# Patient Record
Sex: MALE | Race: BLACK OR AFRICAN AMERICAN | Employment: UNEMPLOYED | URBAN - METROPOLITAN AREA
[De-identification: names, ages, dates, MRNs, and addresses within clinical notes are randomized per-mention and may not be internally consistent; named-entity substitution may affect disease eponyms.]

---

## 2019-02-25 ENCOUNTER — APPOINTMENT (OUTPATIENT)
Dept: ULTRASOUND IMAGING | Age: 59
DRG: 175 | End: 2019-02-25
Attending: HOSPITALIST
Payer: MEDICARE

## 2019-02-25 ENCOUNTER — HOSPITAL ENCOUNTER (INPATIENT)
Age: 59
LOS: 4 days | Discharge: HOME | DRG: 175 | End: 2019-03-01
Attending: EMERGENCY MEDICINE | Admitting: HOSPITALIST
Payer: MEDICARE

## 2019-02-25 DIAGNOSIS — I10 HYPERTENSION, UNSPECIFIED TYPE: ICD-10-CM

## 2019-02-25 DIAGNOSIS — J96.00 ACUTE RESPIRATORY FAILURE, UNSPECIFIED WHETHER WITH HYPOXIA OR HYPERCAPNIA (HCC): ICD-10-CM

## 2019-02-25 DIAGNOSIS — J44.1 COPD EXACERBATION (HCC): ICD-10-CM

## 2019-02-25 DIAGNOSIS — I26.99 OTHER ACUTE PULMONARY EMBOLISM WITHOUT ACUTE COR PULMONALE (HCC): Primary | ICD-10-CM

## 2019-02-25 PROBLEM — J96.01 ACUTE RESPIRATORY FAILURE WITH HYPOXIA (HCC): Status: ACTIVE | Noted: 2019-02-25

## 2019-02-25 LAB
ARTERIAL PATENCY WRIST A: ABNORMAL
BASE DEFICIT BLDV-SCNC: 3 MMOL/L
BDY SITE: ABNORMAL
BNP SERPL-MCNC: 228 PG/ML
CK MB CFR SERPL CALC: 1.5 % (ref 0–2.5)
CK MB SERPL-MCNC: 4.5 NG/ML (ref 5–25)
CK SERPL-CCNC: 299 U/L (ref 39–308)
COMMENT, HOLDF: NORMAL
GAS FLOW.O2 O2 DELIVERY SYS: ABNORMAL L/MIN
HCO3 BLDV-SCNC: 22.4 MMOL/L (ref 23–28)
O2/TOTAL GAS SETTING VFR VENT: 50 %
PCO2 BLDV: 40 MMHG (ref 41–51)
PH BLDV: 7.36 [PH] (ref 7.32–7.42)
PO2 BLDV: 175 MMHG (ref 25–40)
SAMPLES BEING HELD,HOLD: NORMAL
SAO2 % BLDV: 100 % (ref 65–88)
SPECIMEN TYPE: ABNORMAL
TROPONIN I SERPL-MCNC: <0.05 NG/ML

## 2019-02-25 PROCEDURE — 94640 AIRWAY INHALATION TREATMENT: CPT

## 2019-02-25 PROCEDURE — 65660000001 HC RM ICU INTERMED STEPDOWN

## 2019-02-25 PROCEDURE — 94660 CPAP INITIATION&MGMT: CPT

## 2019-02-25 PROCEDURE — 82550 ASSAY OF CK (CPK): CPT

## 2019-02-25 PROCEDURE — 82803 BLOOD GASES ANY COMBINATION: CPT

## 2019-02-25 PROCEDURE — 93970 EXTREMITY STUDY: CPT

## 2019-02-25 PROCEDURE — 74011000250 HC RX REV CODE- 250

## 2019-02-25 PROCEDURE — 82553 CREATINE MB FRACTION: CPT

## 2019-02-25 PROCEDURE — 84484 ASSAY OF TROPONIN QUANT: CPT

## 2019-02-25 PROCEDURE — 36415 COLL VENOUS BLD VENIPUNCTURE: CPT

## 2019-02-25 PROCEDURE — 77030029684 HC NEB SM VOL KT MONA -A

## 2019-02-25 PROCEDURE — 5A09357 ASSISTANCE WITH RESPIRATORY VENTILATION, LESS THAN 24 CONSECUTIVE HOURS, CONTINUOUS POSITIVE AIRWAY PRESSURE: ICD-10-PCS | Performed by: HOSPITALIST

## 2019-02-25 PROCEDURE — 74011250636 HC RX REV CODE- 250/636: Performed by: HOSPITALIST

## 2019-02-25 PROCEDURE — 74011000250 HC RX REV CODE- 250: Performed by: EMERGENCY MEDICINE

## 2019-02-25 PROCEDURE — 99285 EMERGENCY DEPT VISIT HI MDM: CPT

## 2019-02-25 PROCEDURE — 83880 ASSAY OF NATRIURETIC PEPTIDE: CPT

## 2019-02-25 PROCEDURE — 74011250637 HC RX REV CODE- 250/637: Performed by: HOSPITALIST

## 2019-02-25 RX ORDER — BUPROPION HYDROCHLORIDE 150 MG/1
150 TABLET, EXTENDED RELEASE ORAL 2 TIMES DAILY
Status: DISCONTINUED | OUTPATIENT
Start: 2019-02-25 | End: 2019-03-01 | Stop reason: HOSPADM

## 2019-02-25 RX ORDER — FLUTICASONE FUROATE AND VILANTEROL 100; 25 UG/1; UG/1
1 POWDER RESPIRATORY (INHALATION) DAILY
Status: DISCONTINUED | OUTPATIENT
Start: 2019-02-26 | End: 2019-02-26

## 2019-02-25 RX ORDER — IPRATROPIUM BROMIDE AND ALBUTEROL SULFATE 2.5; .5 MG/3ML; MG/3ML
SOLUTION RESPIRATORY (INHALATION)
Status: COMPLETED
Start: 2019-02-25 | End: 2019-02-25

## 2019-02-25 RX ORDER — IPRATROPIUM BROMIDE AND ALBUTEROL SULFATE 2.5; .5 MG/3ML; MG/3ML
3 SOLUTION RESPIRATORY (INHALATION)
Status: COMPLETED | OUTPATIENT
Start: 2019-02-25 | End: 2019-02-25

## 2019-02-25 RX ORDER — HYDROCHLOROTHIAZIDE 25 MG/1
25 TABLET ORAL DAILY
Status: DISCONTINUED | OUTPATIENT
Start: 2019-02-25 | End: 2019-02-28

## 2019-02-25 RX ORDER — ASPIRIN 81 MG/1
81 TABLET ORAL DAILY
Status: DISCONTINUED | OUTPATIENT
Start: 2019-02-25 | End: 2019-03-01 | Stop reason: HOSPADM

## 2019-02-25 RX ORDER — NIFEDIPINE 90 MG/1
90 TABLET, EXTENDED RELEASE ORAL DAILY
Status: DISCONTINUED | OUTPATIENT
Start: 2019-02-25 | End: 2019-03-01 | Stop reason: HOSPADM

## 2019-02-25 RX ORDER — IPRATROPIUM BROMIDE AND ALBUTEROL SULFATE 2.5; .5 MG/3ML; MG/3ML
3 SOLUTION RESPIRATORY (INHALATION) ONCE
Status: COMPLETED | OUTPATIENT
Start: 2019-02-25 | End: 2019-02-25

## 2019-02-25 RX ORDER — ACETAMINOPHEN 325 MG/1
325 TABLET ORAL
Status: DISCONTINUED | OUTPATIENT
Start: 2019-02-25 | End: 2019-03-01 | Stop reason: HOSPADM

## 2019-02-25 RX ORDER — LORAZEPAM 2 MG/ML
0.5 INJECTION INTRAMUSCULAR
Status: DISCONTINUED | OUTPATIENT
Start: 2019-02-25 | End: 2019-03-01 | Stop reason: HOSPADM

## 2019-02-25 RX ORDER — LABETALOL HCL 20 MG/4 ML
20 SYRINGE (ML) INTRAVENOUS
Status: DISCONTINUED | OUTPATIENT
Start: 2019-02-25 | End: 2019-03-01 | Stop reason: HOSPADM

## 2019-02-25 RX ORDER — MONTELUKAST SODIUM 10 MG/1
10 TABLET ORAL DAILY
Status: DISCONTINUED | OUTPATIENT
Start: 2019-02-25 | End: 2019-03-01 | Stop reason: HOSPADM

## 2019-02-25 RX ORDER — ALBUTEROL SULFATE 90 UG/1
2 AEROSOL, METERED RESPIRATORY (INHALATION)
COMMUNITY

## 2019-02-25 RX ORDER — LISINOPRIL 20 MG/1
40 TABLET ORAL DAILY
Status: DISCONTINUED | OUTPATIENT
Start: 2019-02-25 | End: 2019-03-01 | Stop reason: HOSPADM

## 2019-02-25 RX ORDER — GUAIFENESIN/DEXTROMETHORPHAN 100-10MG/5
10 SYRUP ORAL EVERY 6 HOURS
Status: COMPLETED | OUTPATIENT
Start: 2019-02-25 | End: 2019-02-28

## 2019-02-25 RX ORDER — ATORVASTATIN CALCIUM 20 MG/1
20 TABLET, FILM COATED ORAL DAILY
Status: DISCONTINUED | OUTPATIENT
Start: 2019-02-25 | End: 2019-03-01 | Stop reason: HOSPADM

## 2019-02-25 RX ORDER — ENOXAPARIN SODIUM 100 MG/ML
1 INJECTION SUBCUTANEOUS EVERY 12 HOURS
Status: DISCONTINUED | OUTPATIENT
Start: 2019-02-25 | End: 2019-02-27

## 2019-02-25 RX ADMIN — ASPIRIN 81 MG: 81 TABLET, COATED ORAL at 18:04

## 2019-02-25 RX ADMIN — GUAIFENESIN AND DEXTROMETHORPHAN 10 ML: 100; 10 SYRUP ORAL at 23:26

## 2019-02-25 RX ADMIN — HYDROCHLOROTHIAZIDE 25 MG: 25 TABLET ORAL at 18:05

## 2019-02-25 RX ADMIN — METHYLPREDNISOLONE SODIUM SUCCINATE 40 MG: 40 INJECTION, POWDER, FOR SOLUTION INTRAMUSCULAR; INTRAVENOUS at 18:05

## 2019-02-25 RX ADMIN — IPRATROPIUM BROMIDE AND ALBUTEROL SULFATE 3 ML: .5; 3 SOLUTION RESPIRATORY (INHALATION) at 13:34

## 2019-02-25 RX ADMIN — ENOXAPARIN SODIUM 70 MG: 80 INJECTION, SOLUTION INTRAVENOUS; SUBCUTANEOUS at 23:26

## 2019-02-25 RX ADMIN — GUAIFENESIN AND DEXTROMETHORPHAN 10 ML: 100; 10 SYRUP ORAL at 18:03

## 2019-02-25 RX ADMIN — ATORVASTATIN CALCIUM 20 MG: 20 TABLET, FILM COATED ORAL at 18:04

## 2019-02-25 RX ADMIN — IPRATROPIUM BROMIDE AND ALBUTEROL SULFATE 3 ML: .5; 3 SOLUTION RESPIRATORY (INHALATION) at 15:57

## 2019-02-25 RX ADMIN — MONTELUKAST 10 MG: 10 TABLET, FILM COATED ORAL at 18:04

## 2019-02-25 RX ADMIN — METHYLPREDNISOLONE SODIUM SUCCINATE 40 MG: 40 INJECTION, POWDER, FOR SOLUTION INTRAMUSCULAR; INTRAVENOUS at 23:26

## 2019-02-25 RX ADMIN — NIFEDIPINE 90 MG: 90 TABLET, FILM COATED, EXTENDED RELEASE ORAL at 18:04

## 2019-02-25 RX ADMIN — LISINOPRIL 40 MG: 20 TABLET ORAL at 18:05

## 2019-02-25 RX ADMIN — BUPROPION HYDROCHLORIDE 150 MG: 150 TABLET, FILM COATED, EXTENDED RELEASE ORAL at 18:04

## 2019-02-25 RX ADMIN — IPRATROPIUM BROMIDE AND ALBUTEROL SULFATE 3 ML: 2.5; .5 SOLUTION RESPIRATORY (INHALATION) at 15:57

## 2019-02-25 NOTE — ED NOTES
Spoke with Yordy Nguyen, [facility] care, and he will place patient on dayshift list to have AD completed.

## 2019-02-25 NOTE — ED NOTES
Patient rang out, reporting severe dyspnea and tachycardia. Patient is diaphoretic. He states that he had a coughing spell and then became severely short of breath. Patient placed back on Bipap. Verbal order received from Dr. Domenica Aguilar for duo-neb.

## 2019-02-25 NOTE — PROGRESS NOTES
Reason for Admission:   Pulmonary emboli, acute respiratory failure with hypoxia RRAT Score:          5 Plan for utilizing home health:      TBD Likelihood of Readmission:  Moderate Transition of Care Plan:                  Pt is a 60 y/o [place] American male who was admitted with a diagnosis of Pulmonary emboli, acute respiratory failure with hypoxia. CM met with pt and pt's family at bedside, introduced self, role. Pt and family verbalized understanding. Pt verified demographic information on chart. Pt sees a physician at [facility]. Pt lives with his girlfriend in a 1 story home with 2 JAREN. Pt is independent in ADL/IADL needs at baseline. Pt does not have DME in the home. Pt does drive, but has supportive family to assist with transportation as needed, to include discharge. Pt has not utilized home health or SNF prior to admission. Pt stated he would like to complete an advance directive tonight. CM informed nursing staff. Pt stated he has no further questions or needs re: discharge planning at this time. CM will continue to remain available for support, discharge planning as needed. Care Management Interventions PCP Verified by CM: Yes Mode of Transport at Discharge: Other (see comment)(Pt's family to transport) Transition of Care Consult (CM Consult): Discharge Planning(Pt is independent in ADL/IADL needs at baseline. Pt has not utlized home health or SNF prior to admission) Discharge Durable Medical Equipment: No(Pt does not have DME in the home) Physical Therapy Consult: No 
Occupational Therapy Consult: No 
Speech Therapy Consult: No 
Current Support Network: Own Home(Pt lives in a 1 story home with 2 JAREN with his girlfriend) Confirm Follow Up Transport: Self Plan discussed with Pt/Family/Caregiver: Yes Discharge Location Discharge Placement: (TBD) Isabella Mojica, MSW, LSW Supervisee in Social Work [facility] [phone]
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
## 2019-02-25 NOTE — ED NOTES
Patient presents to ED via [facility] transport from [facility] for shortness of breath. Patient currently on Bipap and RT at bedside to set up patient in ED. Patient A&O x4 and placed on monitor x3. Patient transported for bilateral PEs.
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
## 2019-02-26 ENCOUNTER — APPOINTMENT (OUTPATIENT)
Dept: NON INVASIVE DIAGNOSTICS | Age: 59
DRG: 175 | End: 2019-02-26
Attending: HOSPITALIST
Payer: MEDICARE

## 2019-02-26 LAB
ANION GAP SERPL CALC-SCNC: 9 MMOL/L (ref 5–15)
BASOPHILS # BLD: 0 K/UL (ref 0–0.1)
BASOPHILS NFR BLD: 0 % (ref 0–1)
BUN SERPL-MCNC: 18 MG/DL (ref 6–20)
BUN/CREAT SERPL: 19 (ref 12–20)
CALCIUM SERPL-MCNC: 8.5 MG/DL (ref 8.5–10.1)
CHLORIDE SERPL-SCNC: 100 MMOL/L (ref 97–108)
CO2 SERPL-SCNC: 24 MMOL/L (ref 21–32)
CREAT SERPL-MCNC: 0.93 MG/DL (ref 0.7–1.3)
DIFFERENTIAL METHOD BLD: ABNORMAL
ECHO AO ROOT DIAM: 3.67 CM
ECHO AV AREA PEAK VELOCITY: 2.9 CM2
ECHO AV AREA/BSA PEAK VELOCITY: 1.6 CM2/M2
ECHO AV CUSP MM: 0 CM
ECHO AV PEAK GRADIENT: 5.9 MMHG
ECHO AV PEAK VELOCITY: 121.54 CM/S
ECHO LA MAJOR AXIS: 2.74 CM
ECHO LA TO AORTIC ROOT RATIO: 0.75
ECHO LV INTERNAL DIMENSION DIASTOLIC: 2.55 CM (ref 4.2–5.9)
ECHO LV INTERNAL DIMENSION SYSTOLIC: 2.09 CM
ECHO LV IVSD: 1.79 CM (ref 0.6–1)
ECHO LV MASS 2D: 144.1 G (ref 88–224)
ECHO LV MASS INDEX 2D: 77.1 G/M2 (ref 49–115)
ECHO LV POSTERIOR WALL DIASTOLIC: 1.17 CM (ref 0.6–1)
ECHO LV POSTERIOR WALL SYSTOLIC: 0.99 CM
ECHO LVOT DIAM: 2.13 CM
ECHO LVOT PEAK GRADIENT: 4 MMHG
ECHO LVOT PEAK VELOCITY: 100.5 CM/S
ECHO LVOT SV: 79.3 ML
ECHO LVOT VTI: 22.22 CM
ECHO MV A VELOCITY: 87.42 CM/S
ECHO MV AREA PHT: 5.7 CM2
ECHO MV AREA VTI: 3.7 CM2
ECHO MV E DECELERATION TIME (DT): 132 MS
ECHO MV E VELOCITY: 0.6 CM/S
ECHO MV E/A RATIO: 0.01
ECHO MV MAX VELOCITY: 82.9 CM/S
ECHO MV MEAN GRADIENT: 1.4 MMHG
ECHO MV PEAK GRADIENT: 2.7 MMHG
ECHO MV PRESSURE HALF TIME (PHT): 38.3 MS
ECHO MV REGURGITANT PEAK GRADIENT: 3.6 MMHG
ECHO MV REGURGITANT PEAK VELOCITY: 95.48 CM/S
ECHO MV VTI: 21.17 CM
ECHO PULMONARY ARTERY SYSTOLIC PRESSURE (PASP): 30 MMHG
ECHO TV MAX VELOCITY: 244.07 CM/S
ECHO TV PEAK GRADIENT: 23.8 MMHG
ECHO TV REGURGITANT MAX VELOCITY: 286.58 CM/S
ECHO TV REGURGITANT PEAK GRADIENT: 32.9 MMHG
EOSINOPHIL # BLD: 0 K/UL (ref 0–0.4)
EOSINOPHIL NFR BLD: 0 % (ref 0–7)
ERYTHROCYTE [DISTWIDTH] IN BLOOD BY AUTOMATED COUNT: 14.1 % (ref 11.5–14.5)
GLUCOSE SERPL-MCNC: 138 MG/DL (ref 65–100)
HCT VFR BLD AUTO: 36.1 % (ref 36.6–50.3)
HGB BLD-MCNC: 12.3 G/DL (ref 12.1–17)
IMM GRANULOCYTES # BLD AUTO: 0 K/UL (ref 0–0.04)
IMM GRANULOCYTES NFR BLD AUTO: 0 % (ref 0–0.5)
LYMPHOCYTES # BLD: 0.7 K/UL (ref 0.8–3.5)
LYMPHOCYTES NFR BLD: 14 % (ref 12–49)
MCH RBC QN AUTO: 32.9 PG (ref 26–34)
MCHC RBC AUTO-ENTMCNC: 34.1 G/DL (ref 30–36.5)
MCV RBC AUTO: 96.5 FL (ref 80–99)
MONOCYTES # BLD: 0.4 K/UL (ref 0–1)
MONOCYTES NFR BLD: 7 % (ref 5–13)
NEUTS SEG # BLD: 3.9 K/UL (ref 1.8–8)
NEUTS SEG NFR BLD: 79 % (ref 32–75)
NRBC # BLD: 0 K/UL (ref 0–0.01)
NRBC BLD-RTO: 0 PER 100 WBC
PLATELET # BLD AUTO: 296 K/UL (ref 150–400)
PMV BLD AUTO: 9 FL (ref 8.9–12.9)
POTASSIUM SERPL-SCNC: 4.1 MMOL/L (ref 3.5–5.1)
RBC # BLD AUTO: 3.74 M/UL (ref 4.1–5.7)
SODIUM SERPL-SCNC: 133 MMOL/L (ref 136–145)
WBC # BLD AUTO: 5 K/UL (ref 4.1–11.1)

## 2019-02-26 PROCEDURE — 74011000250 HC RX REV CODE- 250: Performed by: HOSPITALIST

## 2019-02-26 PROCEDURE — 94660 CPAP INITIATION&MGMT: CPT

## 2019-02-26 PROCEDURE — 74011250636 HC RX REV CODE- 250/636: Performed by: HOSPITALIST

## 2019-02-26 PROCEDURE — 77010033678 HC OXYGEN DAILY

## 2019-02-26 PROCEDURE — 93306 TTE W/DOPPLER COMPLETE: CPT

## 2019-02-26 PROCEDURE — 36415 COLL VENOUS BLD VENIPUNCTURE: CPT

## 2019-02-26 PROCEDURE — 65660000001 HC RM ICU INTERMED STEPDOWN

## 2019-02-26 PROCEDURE — 74011250636 HC RX REV CODE- 250/636: Performed by: INTERNAL MEDICINE

## 2019-02-26 PROCEDURE — 74011250637 HC RX REV CODE- 250/637: Performed by: NURSE PRACTITIONER

## 2019-02-26 PROCEDURE — 94640 AIRWAY INHALATION TREATMENT: CPT

## 2019-02-26 PROCEDURE — 94761 N-INVAS EAR/PLS OXIMETRY MLT: CPT

## 2019-02-26 PROCEDURE — 80048 BASIC METABOLIC PNL TOTAL CA: CPT

## 2019-02-26 PROCEDURE — 85025 COMPLETE CBC W/AUTO DIFF WBC: CPT

## 2019-02-26 PROCEDURE — 74011250637 HC RX REV CODE- 250/637: Performed by: INTERNAL MEDICINE

## 2019-02-26 PROCEDURE — 74011250637 HC RX REV CODE- 250/637: Performed by: HOSPITALIST

## 2019-02-26 RX ORDER — IBUPROFEN 200 MG
1 TABLET ORAL DAILY
Status: DISCONTINUED | OUTPATIENT
Start: 2019-02-26 | End: 2019-02-26

## 2019-02-26 RX ORDER — BENZONATATE 100 MG/1
100 CAPSULE ORAL 3 TIMES DAILY
Status: DISCONTINUED | OUTPATIENT
Start: 2019-02-26 | End: 2019-03-01 | Stop reason: HOSPADM

## 2019-02-26 RX ORDER — LEVOFLOXACIN 5 MG/ML
750 INJECTION, SOLUTION INTRAVENOUS EVERY 24 HOURS
Status: DISCONTINUED | OUTPATIENT
Start: 2019-02-26 | End: 2019-02-28 | Stop reason: SDUPTHER

## 2019-02-26 RX ORDER — IBUPROFEN 200 MG
1 TABLET ORAL DAILY PRN
Status: DISCONTINUED | OUTPATIENT
Start: 2019-02-26 | End: 2019-03-01 | Stop reason: HOSPADM

## 2019-02-26 RX ORDER — IPRATROPIUM BROMIDE AND ALBUTEROL SULFATE 2.5; .5 MG/3ML; MG/3ML
3 SOLUTION RESPIRATORY (INHALATION)
Status: DISCONTINUED | OUTPATIENT
Start: 2019-02-26 | End: 2019-03-01 | Stop reason: HOSPADM

## 2019-02-26 RX ADMIN — METHYLPREDNISOLONE SODIUM SUCCINATE 40 MG: 40 INJECTION, POWDER, FOR SOLUTION INTRAMUSCULAR; INTRAVENOUS at 14:34

## 2019-02-26 RX ADMIN — ATORVASTATIN CALCIUM 20 MG: 20 TABLET, FILM COATED ORAL at 09:24

## 2019-02-26 RX ADMIN — ASPIRIN 81 MG: 81 TABLET, COATED ORAL at 09:24

## 2019-02-26 RX ADMIN — METHYLPREDNISOLONE SODIUM SUCCINATE 40 MG: 40 INJECTION, POWDER, FOR SOLUTION INTRAMUSCULAR; INTRAVENOUS at 06:07

## 2019-02-26 RX ADMIN — HYDROCHLOROTHIAZIDE 25 MG: 25 TABLET ORAL at 09:23

## 2019-02-26 RX ADMIN — MONTELUKAST 10 MG: 10 TABLET, FILM COATED ORAL at 09:24

## 2019-02-26 RX ADMIN — BUPROPION HYDROCHLORIDE 150 MG: 150 TABLET, FILM COATED, EXTENDED RELEASE ORAL at 09:23

## 2019-02-26 RX ADMIN — GUAIFENESIN AND DEXTROMETHORPHAN 10 ML: 100; 10 SYRUP ORAL at 06:00

## 2019-02-26 RX ADMIN — ENOXAPARIN SODIUM 70 MG: 80 INJECTION, SOLUTION INTRAVENOUS; SUBCUTANEOUS at 22:02

## 2019-02-26 RX ADMIN — METHYLPREDNISOLONE SODIUM SUCCINATE 40 MG: 40 INJECTION, POWDER, FOR SOLUTION INTRAMUSCULAR; INTRAVENOUS at 22:02

## 2019-02-26 RX ADMIN — BENZONATATE 100 MG: 100 CAPSULE ORAL at 22:02

## 2019-02-26 RX ADMIN — ENOXAPARIN SODIUM 70 MG: 80 INJECTION, SOLUTION INTRAVENOUS; SUBCUTANEOUS at 09:23

## 2019-02-26 RX ADMIN — NIFEDIPINE 90 MG: 90 TABLET, FILM COATED, EXTENDED RELEASE ORAL at 09:24

## 2019-02-26 RX ADMIN — IPRATROPIUM BROMIDE AND ALBUTEROL SULFATE 3 ML: .5; 3 SOLUTION RESPIRATORY (INHALATION) at 09:00

## 2019-02-26 RX ADMIN — UMECLIDINIUM BROMIDE AND VILANTEROL TRIFENATATE 1 PUFF: 62.5; 25 POWDER RESPIRATORY (INHALATION) at 10:53

## 2019-02-26 RX ADMIN — BENZONATATE 100 MG: 100 CAPSULE ORAL at 10:06

## 2019-02-26 RX ADMIN — GUAIFENESIN AND DEXTROMETHORPHAN 10 ML: 100; 10 SYRUP ORAL at 12:24

## 2019-02-26 RX ADMIN — GUAIFENESIN AND DEXTROMETHORPHAN 10 ML: 100; 10 SYRUP ORAL at 17:30

## 2019-02-26 RX ADMIN — BENZONATATE 100 MG: 100 CAPSULE ORAL at 16:28

## 2019-02-26 RX ADMIN — LEVOFLOXACIN 750 MG: 5 INJECTION, SOLUTION INTRAVENOUS at 10:06

## 2019-02-26 RX ADMIN — BUPROPION HYDROCHLORIDE 150 MG: 150 TABLET, FILM COATED, EXTENDED RELEASE ORAL at 17:30

## 2019-02-26 RX ADMIN — LISINOPRIL 40 MG: 20 TABLET ORAL at 09:23

## 2019-02-26 RX ADMIN — IPRATROPIUM BROMIDE AND ALBUTEROL SULFATE 3 ML: .5; 3 SOLUTION RESPIRATORY (INHALATION) at 02:33

## 2019-02-26 NOTE — PROGRESS NOTES
*CM acknowledged consult* CM will contact [phone] [phone] to receive cash pricing for pt's mediation. CM will need script to fax to [facility] in order to receive ideal pricing for medication (including pt's insurance) ALYSSA Henderson, [address] [facility]

## 2019-02-26 NOTE — PROGRESS NOTES
ADULT PROTOCOL: JET AEROSOL ASSESSMENT Patient  Anson Sorto     59 y.o.   male     2019  5:43 AM 
 
Breath Sounds Pre Procedure: Right Breath Sounds: Lower, Crackles, Diminished Left Breath Sounds: Lower, Crackles, Diminished Breath Sounds Post Procedure: Right Breath Sounds: Diminished Left Breath Sounds: Diminished Breathing pattern: Pre procedure Breathing Pattern: Dyspnea at rest 
        Post procedure Breathing Pattern: Regular Heart Rate: Pre procedure Pulse: 86 
         Post procedure Pulse: 77 Resp Rate: Pre procedure Respirations: 26 Post procedure Respirations: 19 Peak Flow: Pre bronchodilator Post bronchodilator FVC/FEV1:  N/A Incentive Spirometry:    
     
 
Cough: Pre procedure Cough: Non-productive, Congested Post procedure Cough: Non-productive Suctioned: NO Sputum: Pre procedure Post procedure Oxygen: O2 Device: BIPAP   BIPAP Changed: NO SpO2: Pre procedure SpO2: 98 %   with oxygen Post procedure SpO2: 98 %  with oxygen Nebulizer Therapy: Current medications Changed: NO Smoking History:  
 Smoking status: Former Smoker  
    Packs/day: 0.25  
    Last attempt to quit: 2/15/2019  
    Years since quittin.0  Smokeless tobacco: Never Used  Tobacco comment: quit smoking 10 days before. Problem List:  
Patient Active Problem List  
Diagnosis Code  [facility] F10.20  Encounter for long-term (current) use of other medications Z79.899  Unspecified essential hypertension I10  
 Pulmonary emboli (HCC) I26.99  
 Acute respiratory failure with hypoxia (HCC) J96.01 Respiratory Therapist: Laura Fong, RT

## 2019-02-26 NOTE — PROGRESS NOTES
Problem: Breathing Pattern - Ineffective Goal: *Absence of hypoxia Outcome: Progressing Towards Goal 
Pt placed on 2L NC after being off bipap for meal.  
 
Problem: Falls - Risk of 
Goal: *Absence of Falls Document [facility] Fall Risk and appropriate interventions in the flowsheet. Outcome: Progressing Towards Goal 
Fall Risk Interventions: 
  
 
  
 
Medication Interventions: Bed/chair exit alarm, Patient to call before getting OOB, Teach patient to arise slowly

## 2019-02-26 NOTE — ED NOTES
Bedside and Verbal shift change report given to Mike Miranda RN  (oncoming nurse) by Jason Mercer RN (offgoing nurse). Report included the following information SBAR, Kardex, ED Summary, MAR, Accordion and Recent Results.

## 2019-02-26 NOTE — PROGRESS NOTES
3093: Patient taken off BiPAP for medications and for meal. Sats 100%. Will continue to monitor. 1748: VINAYAK Saenz called regarding diet order. Pt requesting an advancement on diet order. Telephone order received for cardiac diet per VINAYAK Wheeler

## 2019-02-26 NOTE — PROGRESS NOTES
Problem: Falls - Risk of 
Goal: *Absence of Falls Document Lacey Richardson Fall Risk and appropriate interventions in the flowsheet. Outcome: Progressing Towards Goal 
Fall Risk Interventions: 
  
 
  
 
Medication Interventions: Bed/chair exit alarm, Patient to call before getting OOB

## 2019-02-26 NOTE — PROGRESS NOTES
1100 
Cardiopulmonary Care Interdisciplinary Rounds were held today to discuss patient's plan of care and outcomes. The following members were present: NP/Physician, Pharmacy, Nursing and Case Management. Expected Length of Stay:  4d 7h 
 
Plan of Care: Continue current treatment plan, pricing eliquis

## 2019-02-26 NOTE — ED NOTES
TRANSFER - OUT REPORT: 
 
Verbal report given to Ruth Ann RN(name) on Prakash Kurtz  being transferred to [address] [place] [address] ([facility]) for routine progression of care Report consisted of patients Situation, Background, Assessment and  
Recommendations(SBAR). Information from the following report(s) SBAR, ED Summary, Procedure Summary, MAR, Recent Results and Cardiac Rhythm S. Tach to NSR was reviewed with the receiving nurse. Lines:  
Peripheral IV 02/25/19 Left Hand (Active) Site Assessment Clean, dry, & intact 2/25/2019  1:49 PM  
Phlebitis Assessment 0 2/25/2019  1:49 PM  
Infiltration Assessment 0 2/25/2019  1:49 PM  
Dressing Status Clean, dry, & intact 2/25/2019  1:49 PM  
Dressing Type Tape;Transparent 2/25/2019  1:49 PM  
Hub Color/Line Status Pink;Flushed 2/25/2019  1:49 PM  
Alcohol Cap Used No 2/25/2019  1:49 PM  
  
 
Opportunity for questions and clarification was provided. Patient transported with: 
 O2 @ 4 liters Patient's medications from home Tech  
RN
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
## 2019-02-26 NOTE — PROGRESS NOTES
0700: Bedside report received from [facility], RN. 
 
1346: Spoke with Dr. Angela Kaplan regarding BiPAP. MD advised RN to have pt off BiPAP for meals (15-30 minutes). Will contact RT. Will continue to monitor. 2890: Sats 84% room air off BiPAP. 2L NC applied and breathing treatment given. Will continue to monitor. 2452: Sats 90%. Will continue to monitor. 1309: Pt remains off BiPAP. Pt on 2L NC, sats 94%. Will continue to monitor. 1900:  
Bedside shift change report given to HALEY Vallecillo (oncoming nurse). Report included the following information SBAR, Kardex, Intake/Output, MAR, Recent Results and Cardiac Rhythm NSR/tach. SHIFT SUMMARY: 
 
 
 
 
 
[address] NURSING NOTE Admission Date 2/25/2019 Admission Diagnosis Acute respiratory failure with hypoxia ([address] [J96.01] Pulmonary emboli ([address] [I26.99] Consults IP CONSULT TO HOSPITALIST 
IP CONSULT TO PULMONOLOGY Cardiac Monitoring [x] Yes [] No  
  
Purposeful Hourly Rounding [x] Yes   
Jess Score Total Score: 1 Jess score 3 or > [x] Bed Alarm [] Avasys [] 1:1 sitter [] Patient refused (Signed refusal form in chart) Zane Score Zane Score: 21 Zane score 14 or < [] PMT consult [] Wound Care consult  
 []  Specialty bed  [] Nutrition consult Influenza Vaccine Received Flu Vaccine for Current Season (usually Sept-March): No  
 Patient/Guardian Refused (Notify MD): Yes Oxygen needs? [] Room air Oxygen @  []1L    [x]2L    []3L   []4L    []5L   []6L via NC Chronic home O2 use? [] Yes [x] No 
Perform O2 challenge test and document in progress note using smartphrase (.Homeoxygen) Last bowel movement Last Bowel Movement Date: 02/22/19 Urinary Catheter LDAs Peripheral IV 02/25/19 Left Hand (Active) Site Assessment Clean, dry, & intact 2/26/2019  3:23 PM  
Phlebitis Assessment 0 2/26/2019  3:23 PM  
Infiltration Assessment 0 2/26/2019  3:23 PM  
 Dressing Status Clean, dry, & intact 2/26/2019  3:23 PM  
Dressing Type Tape;Transparent 2/26/2019  3:23 PM  
Hub Color/Line Status Pink;Flushed 2/26/2019  3:23 PM  
Alcohol Cap Used No 2/25/2019  1:49 PM  
                  
  
Readmission Risk Assessment Tool Score Low Risk   
      
 5 Total Score   
  
 5 Pt. Coverage (Medicare=5 , Medicaid, or Self-Pay=4) Criteria that do not apply:  
 Has Seen PCP in Last 6 Months (Yes=3, No=0) . Living with Significant Other. Assisted Living. LTAC. SNF. or  
Rehab Patient Length of Stay (>5 days = 3) IP Visits Last 12 Months (1-3=4, 4=9, >4=11) Charlson Comorbidity Score (Age + Comorbid Conditions) Expected Length of Stay 4d 7h Actual Length of Stay 1

## 2019-02-26 NOTE — PROGRESS NOTES
Hospitalist Progress Note NAME: Harrison Romero :  1960 MRN:  [id number] Interim Hospital Summary: 59 y.o. male whom presented on 2019 with Assessment / Plan: 
Acute hypoxic and hypercapnic respiratory failure, POA Small bilateral PEs, POA, unprovoked first thromboembolic event COPD with acute exacerbation Tobacco abuse - patient in respiratory distress at [facility], wheezing, speaking only 2-3 words. Improved with bipap. At [address] ER able to be on 4L O2 for 2 hours before coughing fit caused recurrent respiratory distress and needed to put back on bipap. Initial ABG 7.3/46/50/23  81% FIO2 35% - CTA chest with small filling defects within branches of both pulmonary arteries, appear to represent small pulmonary emboli. There is evidence of bullous change involving both lungs. No areas of infiltration are identified. No pleural effusions are seen. Duplex study (-) for DVT 
- lovenox 1mg/kg q12h for now. CM has been consulted on cost assessment for Eliquis 
- Echo result pending Troponin (-), NT pro- Check b/l lower leg dopplers. Since PE is unprovoked, should f/u up with pcp to ensure cancer screening is up to date. - appreciate pulmonology input; continue bipap PRN Continue with IV solumedrol. Xopenex and atrovent nebs. - complete 5 days of ABX 
- continue with anoro ellipta (uses Advair at home), singulair, and duoneb 
- scheduled tessalon perles for 3 days then PRN 
- continue with mucinex 
- continue with wellbutrin; pt agreed with importance of smoking cessation. Pt has been informed to use nicotine patch if he wishes to try HTN Hyperlipidemia 
- continue lisinopril, hctz, nifedipine 
- continue wellubtrin for smoke cessation 
- continue lipitor BMI 23.3 
  
Code: full code DVT prophylaxis:  lovenox Surrogate decision maker:  Discussed with patient, he appoints daughter Nayan Ayers [phone] or son Lynn Lin [phone] as surrogate decision makers. He is  but  x 5 years and currently living with a girlfriend. Disposition: home with family Subjective: Chief Complaint / Reason for Physician Visit \"I can breath a little better today than yesterday\". Discussed with RN events overnight. Review of Systems: 
Symptom Y/N Comments  Symptom Y/N Comments Fever/Chills n   Chest Pain y Poor Appetite    Edema Cough y   Abdominal Pain Sputum n   Joint Pain SOB/BARCENAS y   Pruritis/Rash Nausea/vomit * Bad coughing spell induces nausea  Tolerating PT/OT Diarrhea    Tolerating Diet Constipation    Other Could NOT obtain due to:   
 
Objective: VITALS:  
Last 24hrs VS reviewed since prior progress note. Most recent are: 
Patient Vitals for the past 24 hrs: 
 Temp Pulse Resp BP SpO2  
02/26/19 1427    127/78   
02/26/19 1038 97.4 °F (36.3 °C) 96 20 127/78 92 % 02/26/19 0951     90 % 02/26/19 0923  96  132/87   
02/26/19 0900     (!) 88 % 02/26/19 0855     100 % 02/26/19 0732 97.7 °F (36.5 °C) 85 20 131/80 98 % 02/26/19 0437 97.8 °F (36.6 °C) 84 18 (!) 132/94 99 % 02/26/19 0228     98 % 02/26/19 0227     98 % 02/25/19 2255     98 % 02/25/19 2251 97.7 °F (36.5 °C) 100 24 153/81 98 % 02/25/19 2201 97.1 °F (36.2 °C) (!) 103 27 (!) 148/93 99 % 02/25/19 2130  87 21 (!) 116/91 100 % 02/25/19 2100  99 16 (!) 152/91 99 % 02/25/19 2030  95 24 137/74 98 % 02/25/19 2000  (!) 103 20 (!) 129/102 98 % 02/25/19 1930     100 % 02/25/19 1900  (!) 106 22 150/90 99 % 02/25/19 1830  (!) 116 22 (!) 168/92 98 % 02/25/19 1800  (!) 109 17 (!) 157/95 99 % 02/25/19 1646     97 % 02/25/19 1553  (!) 139 29 (!) 207/99 96 % 02/25/19 1551     (!) 87 % 02/25/19 1550 98.6 °F (37 °C) (!) 144 26 124/63 100 % Intake/Output Summary (Last 24 hours) at 2/26/2019 1457 Last data filed at 2/26/2019 1441 Gross per 24 hour Intake 1320 ml Output 1500 ml Net -180 ml PHYSICAL EXAM: 
General: WD, WN. Alert, cooperative, no acute distress   
EENT:  EOMI. Anicteric sclerae. MMM Resp:  Bilateral exp wheezing. No accessory muscle use CV:  Regular  rhythm,  No edema GI:  Soft, Non distended, Non tender.  +Bowel sounds Neurologic:  Alert and oriented X 3, normal speech, Psych:   Good insight. Not anxious nor agitated Skin:  No rashes. No jaundice Reviewed most current lab test results and cultures  YES Reviewed most current radiology test results   YES Review and summation of old records today    NO Reviewed patient's current orders and MAR    YES 
PMH/SH reviewed - no change compared to H&P 
________________________________________________________________________ Care Plan discussed with: 
  Comments Patient y Family RN y   
Care Manager y Consultant     
                 y Multidiciplinary team rounds were held today with , nursing, pharmacist and clinical coordinator. Patient's plan of care was discussed; medications were reviewed and discharge planning was addressed. ________________________________________________________________________ Total NON critical care TIME:  30   Minutes Total CRITICAL CARE TIME Spent:   Minutes non procedure based Comments >50% of visit spent in counseling and coordination of care    
________________________________________________________________________ Reji Sheldon NP Procedures: see electronic medical records for all procedures/Xrays and details which were not copied into this note but were reviewed prior to creation of Plan. LABS: 
I reviewed today's most current labs and imaging studies. Pertinent labs include: 
Recent Labs  
  02/26/19 0444 02/25/19 2029 WBC 5.0 10.3 HGB 12.3 13.6 HCT 36.1* 40.6  327 Recent Labs  
  02/26/19 0444 02/25/19 
1258 * 136  
K 4.1 3.6  98 CO2 24 25 * 144* BUN 18 15 CREA 0.93 1.06  
CA 8.5 8.7 ALB  --  3.9 TBILI  --  0.2 SGOT  --  26 ALT  --  37 INR  --  1.0 Signed: )Christen Granger, NP

## 2019-02-26 NOTE — ACP (ADVANCE CARE PLANNING)
Responded to page from the nursing staff saying that patient and family insisted that someone assist them with AMD this evening itself. Met with patient, his son Emmanuel, and daughter Horace Callahan at bedside. Explained document to them in the room and answered all their questions. Assisted patient in completing the document. Made copy for patient's chart. Returned original document to the patient with two copies for the children. Patient's daughter Bacilio Scott, [phone] (cell), [phone] (home) is his primary decision maker. His son Blue Perez, [phone] is his secondary decision maker. Rev.  Chaplain Roland  Paging Service: [phone])

## 2019-02-26 NOTE — PROGRESS NOTES
TRANSFER - IN REPORT: 
 
Verbal report received from Milady(name) on [facility]  being received from ED(unit) for routine progression of care Report consisted of patients Situation, Background, Assessment and  
Recommendations(SBAR). Information from the following report(s) SBAR, Kardex and MAR was reviewed with the receiving nurse. Opportunity for questions and clarification was provided. Assessment completed upon patients arrival to unit and care assumed.

## 2019-02-26 NOTE — PROGRESS NOTES
Spiritual Care Assessment/Progress Note [address] 
 
 
NAME: Anne Peña      MRN: [id number] AGE: 59 y.o. SEX: male Zoroastrian Affiliation: Hinduism  
Language: English  
 
2/25/2019     Total Time (in minutes): 80  
 
Spiritual Assessment begun in [facility] EM[facility]Y DEPT through conversation with: 
  
    [x]Patient        [x] Family    [] Friend(s) Reason for Consult: Advance medical directive consult, Request by patient, Request by staff Spiritual beliefs: (Please include comment if needed) [x] Identifies with a warren tradition:     
   [] Supported by a warren community:        
   [] Claims no spiritual orientation:       
   [] Seeking spiritual identity:            
   [] Adheres to an individual form of spirituality:       
   [] Not able to assess:                   
 
    
Identified resources for coping:  
   [x] Prayer                           
   [] Music                  [] Guided Imagery [x] Family/friends                 [] Pet visits [] Devotional reading                         [] Unknown 
   [] Other:                                        
 
 
Interventions offered during this visit: (See comments for more details) Patient Interventions: Advance medical directive completed, Affirmation of emotions/emotional suffering, Affirmation of warren, Prayer (assurance of) Family/Friend(s): Affirmation of emotions/emotional suffering, Affirmation of warren, Prayer (assurance of) Plan of Care: 
 
 [x] Support spiritual and/or cultural needs [x] Support AMD and/or advance care planning process    
 [] Support grieving process 
 [] Coordinate Rites and/or Rituals  
 [] Coordination with community clergy [] No spiritual needs identified at this time 
 [] Detailed Plan of Care below (See Comments)  [] Make referral to Music Therapy 
[] Make referral to Pet Therapy    
[] Make referral to Addiction services 
[] Make referral to [facility] [] Make referral to Spiritual Care Partner 
[] No future visits requested       
[] Follow up visits as needed Comments: Responded to page from nursing staff in the ER for an Advance Medical Directive consult. Explained to the nurse that since patient was not going into surgery and was being admitted, day shift chaplains will follow up for the consult. Responded to a second page about thirty minutes later from the nursing staff saying that patient and family insisted that someone assist them with AMD this evening itself. Met with patient, his son Tanja Hewitt, and daughter Brea Muhammad at bedside. Explained document to them in the room and answered all their questions. Assisted patient in completing the document. Made copy for patient's chart. Returned original document to the patient with two copies for the children. Patient's daughter Ifrah Maria, [phone] (cell), [phone] (home) is his primary decision maker. His son Teresa Ham, [phone] is his secondary decision maker. Offered pastoral support and advised of  availability. Rev. Sergio Mendiola Service: [phone])

## 2019-02-27 LAB
ANION GAP SERPL CALC-SCNC: 7 MMOL/L (ref 5–15)
BUN SERPL-MCNC: 25 MG/DL (ref 6–20)
BUN/CREAT SERPL: 23 (ref 12–20)
CALCIUM SERPL-MCNC: 8.6 MG/DL (ref 8.5–10.1)
CHLORIDE SERPL-SCNC: 98 MMOL/L (ref 97–108)
CO2 SERPL-SCNC: 26 MMOL/L (ref 21–32)
CREAT SERPL-MCNC: 1.08 MG/DL (ref 0.7–1.3)
GLUCOSE SERPL-MCNC: 117 MG/DL (ref 65–100)
POTASSIUM SERPL-SCNC: 4.3 MMOL/L (ref 3.5–5.1)
SODIUM SERPL-SCNC: 131 MMOL/L (ref 136–145)

## 2019-02-27 PROCEDURE — 74011250637 HC RX REV CODE- 250/637: Performed by: NURSE PRACTITIONER

## 2019-02-27 PROCEDURE — 65660000001 HC RM ICU INTERMED STEPDOWN

## 2019-02-27 PROCEDURE — 80048 BASIC METABOLIC PNL TOTAL CA: CPT

## 2019-02-27 PROCEDURE — 36415 COLL VENOUS BLD VENIPUNCTURE: CPT

## 2019-02-27 PROCEDURE — 74011250636 HC RX REV CODE- 250/636: Performed by: INTERNAL MEDICINE

## 2019-02-27 PROCEDURE — 77010033678 HC OXYGEN DAILY

## 2019-02-27 PROCEDURE — 74011250637 HC RX REV CODE- 250/637: Performed by: INTERNAL MEDICINE

## 2019-02-27 PROCEDURE — 74011250637 HC RX REV CODE- 250/637: Performed by: EMERGENCY MEDICINE

## 2019-02-27 PROCEDURE — 74011250636 HC RX REV CODE- 250/636: Performed by: HOSPITALIST

## 2019-02-27 PROCEDURE — 74011250636 HC RX REV CODE- 250/636: Performed by: NURSE PRACTITIONER

## 2019-02-27 PROCEDURE — 74011250637 HC RX REV CODE- 250/637: Performed by: HOSPITALIST

## 2019-02-27 RX ORDER — PREDNISONE 20 MG/1
40 TABLET ORAL
Status: DISCONTINUED | OUTPATIENT
Start: 2019-02-28 | End: 2019-03-01 | Stop reason: HOSPADM

## 2019-02-27 RX ADMIN — ACETAMINOPHEN 325 MG: 325 TABLET ORAL at 09:16

## 2019-02-27 RX ADMIN — GUAIFENESIN AND DEXTROMETHORPHAN 10 ML: 100; 10 SYRUP ORAL at 00:06

## 2019-02-27 RX ADMIN — BENZONATATE 100 MG: 100 CAPSULE ORAL at 09:12

## 2019-02-27 RX ADMIN — METHYLPREDNISOLONE SODIUM SUCCINATE 40 MG: 40 INJECTION, POWDER, FOR SOLUTION INTRAMUSCULAR; INTRAVENOUS at 15:13

## 2019-02-27 RX ADMIN — ASPIRIN 81 MG: 81 TABLET, COATED ORAL at 09:12

## 2019-02-27 RX ADMIN — BUPROPION HYDROCHLORIDE 150 MG: 150 TABLET, FILM COATED, EXTENDED RELEASE ORAL at 17:02

## 2019-02-27 RX ADMIN — ENOXAPARIN SODIUM 70 MG: 80 INJECTION, SOLUTION INTRAVENOUS; SUBCUTANEOUS at 09:18

## 2019-02-27 RX ADMIN — GUAIFENESIN AND DEXTROMETHORPHAN 10 ML: 100; 10 SYRUP ORAL at 17:02

## 2019-02-27 RX ADMIN — GUAIFENESIN AND DEXTROMETHORPHAN 10 ML: 100; 10 SYRUP ORAL at 12:13

## 2019-02-27 RX ADMIN — UMECLIDINIUM BROMIDE AND VILANTEROL TRIFENATATE 1 PUFF: 62.5; 25 POWDER RESPIRATORY (INHALATION) at 09:22

## 2019-02-27 RX ADMIN — GUAIFENESIN AND DEXTROMETHORPHAN 10 ML: 100; 10 SYRUP ORAL at 06:18

## 2019-02-27 RX ADMIN — BUPROPION HYDROCHLORIDE 150 MG: 150 TABLET, FILM COATED, EXTENDED RELEASE ORAL at 09:12

## 2019-02-27 RX ADMIN — BENZONATATE 100 MG: 100 CAPSULE ORAL at 15:13

## 2019-02-27 RX ADMIN — METHYLPREDNISOLONE SODIUM SUCCINATE 40 MG: 40 INJECTION, POWDER, FOR SOLUTION INTRAMUSCULAR; INTRAVENOUS at 06:18

## 2019-02-27 RX ADMIN — MONTELUKAST 10 MG: 10 TABLET, FILM COATED ORAL at 09:12

## 2019-02-27 RX ADMIN — METHYLPREDNISOLONE SODIUM SUCCINATE 40 MG: 40 INJECTION, POWDER, FOR SOLUTION INTRAMUSCULAR; INTRAVENOUS at 21:35

## 2019-02-27 RX ADMIN — LISINOPRIL 40 MG: 20 TABLET ORAL at 09:12

## 2019-02-27 RX ADMIN — APIXABAN 10 MG: 5 TABLET, FILM COATED ORAL at 21:34

## 2019-02-27 RX ADMIN — LEVOFLOXACIN 750 MG: 5 INJECTION, SOLUTION INTRAVENOUS at 09:11

## 2019-02-27 RX ADMIN — ATORVASTATIN CALCIUM 20 MG: 20 TABLET, FILM COATED ORAL at 09:12

## 2019-02-27 RX ADMIN — NIFEDIPINE 90 MG: 90 TABLET, FILM COATED, EXTENDED RELEASE ORAL at 09:12

## 2019-02-27 RX ADMIN — BENZONATATE 100 MG: 100 CAPSULE ORAL at 21:34

## 2019-02-27 RX ADMIN — HYDROCHLOROTHIAZIDE 25 MG: 25 TABLET ORAL at 09:12

## 2019-02-27 NOTE — PROGRESS NOTES
0700: Received bedside report from [address] Bo, off going nurse. Assumed care of patient. Problem: Falls - Risk of 
Goal: *Absence of Falls Document Rhonda Murphy Fall Risk and appropriate interventions in the flowsheet. Outcome: Progressing Towards Goal 
Fall Risk Interventions: 
Mobility Interventions: Bed/chair exit alarm Medication Interventions: Patient to call before getting OOB, Teach patient to arise slowly 1045: Pt had a 7 second run of SVT, spontaneously back in NSR, not symptomatic. MD aware. Will continue to monitor. 1900: Bedside shift change report given to [facility] , [facility] (oncoming nurse). Report included the following information SBAR, Kardex, Intake/Output, MAR, Recent Results and Cardiac Rhythm NSR.  
 
SHIFT SUMMARY: 
lovenox switched to eliquis, will be $8 w/ insurance coverage. Attempted to wean O2, on 2L. CONCERNS TO ADDRESS WITH MD: 
n/a 
 
 
[facility] NURSING NOTE Admission Date 2/25/2019 Admission Diagnosis Acute respiratory failure with hypoxia ([facility] [address] [J96.01] Pulmonary emboli ([address] [I26.99] Consults IP CONSULT TO HOSPITALIST 
IP CONSULT TO PULMONOLOGY Cardiac Monitoring [x] Yes [] No  
  
Purposeful Hourly Rounding [x] Yes   
Jess Score Total Score: 2 Jess score 3 or > [] Bed Alarm [] Avasys [] 1:1 sitter [] Patient refused (Signed refusal form in chart) Zane Score Zane Score: 21 Zane score 14 or < [] PMT consult [] Wound Care consult  
 []  Specialty bed  [] Nutrition consult Influenza Vaccine Received Flu Vaccine for Current Season (usually Sept-March): No  
 Patient/Guardian Refused (Notify MD): Yes Oxygen needs? [] Room air Oxygen @  []1L    [x]2L    []3L   []4L    []5L   []6L via NC Chronic home O2 use? [] Yes [x] No 
Perform O2 challenge test and document in progress note using smartphrase (.Homeoxygen) Last bowel movement Last Bowel Movement Date: 02/26/19 Urinary Catheter LDAs Peripheral IV 02/25/19 Left Hand (Active) Site Assessment Clean, dry, & intact 2/27/2019  3:06 PM  
Phlebitis Assessment 0 2/27/2019  3:06 PM  
Infiltration Assessment 0 2/27/2019  3:06 PM  
Dressing Status Clean, dry, & intact 2/27/2019  3:06 PM  
Dressing Type Transparent 2/27/2019  3:06 PM  
Hub Color/Line Status Pink;Flushed;Patent 2/27/2019  3:06 PM  
Alcohol Cap Used No 2/25/2019  1:49 PM  
                  
  
Readmission Risk Assessment Tool Score Low Risk   
      
 5 Total Score   
  
 5 Pt. Coverage (Medicare=5 , Medicaid, or Self-Pay=4) Criteria that do not apply:  
 Has Seen PCP in Last 6 Months (Yes=3, No=0) . Living with Significant Other. Assisted Living. LTAC. SNF. or  
Rehab Patient Length of Stay (>5 days = 3) IP Visits Last 12 Months (1-3=4, 4=9, >4=11) Charlson Comorbidity Score (Age + Comorbid Conditions) Expected Length of Stay 4d 7h Actual Length of Stay 2

## 2019-02-27 NOTE — PROGRESS NOTES
CM received pt's script for price check for Eliquis. CM will send script to [facility], vi fax [phone]. UPDATE: 2:22PM 
 
CM sent scripts to [facility] and was made aware that pt script with insurance included will cost pt less then $10.00. CM will inform MD and pt's nurse ALYSSA Castle, [address]

## 2019-02-27 NOTE — PROGRESS NOTES
Problem: Falls - Risk of 
Goal: *Absence of Falls Document Melody Fearing Fall Risk and appropriate interventions in the flowsheet. Outcome: Progressing Towards Goal 
Fall Risk Interventions: 
Mobility Interventions: Bed/chair exit alarm, Strengthening exercises (ROM-active/passive), Patient to call before getting OOB Medication Interventions: Bed/chair exit alarm, Patient to call before getting OOB

## 2019-02-27 NOTE — PROGRESS NOTES
Hospitalist Progress Note NAME: Mart Dance :  1960 MRN:  [id number] Interim Hospital Summary: 59 y.o. male whom presented on 2019 with Assessment / Plan: continue with clinical improvement. No BiPAP overnight. Currently on O2 @ 2L via n/c. Acute hypoxic and hypercapnic respiratory failure, POA Small bilateral PEs, POA, unprovoked first thromboembolic event COPD with acute exacerbation Tobacco abuse - patient in respiratory distress at [facility], wheezing, speaking only 2-3 words.  Improved with bipap.  At [facility] ER able to be on 4L O2 for 2 hours before coughing fit caused recurrent respiratory distress and needed to put back on bipap.  Initial ABG 7.3/46/50/23  81% FIO2 35% - CTA chest with small filling defects within branches of both pulmonary arteries, appear to represent small pulmonary emboli. There is evidence of bullous change involving both lungs. No areas of infiltration are identified. No pleural effusions are seen. Duplex study (-) for DVT 
- appreciate CM's input. Lovenox changed to Eliquis 
- Echo result: 
· Estimated left ventricular ejection fraction is 61 - 65%. · Aortic valve is probably trileaflet. · Mitral valve non-specific thickening. Trace mitral valve regurgitation. Troponin (-), NT pro- 
 Since PE is unprovoked, should f/u up with pcp to ensure cancer screening is up to date. - appreciate pulmonology input; continue bipap PRN, no BiPAP overnight. Currently on O2 @ 2L via n/c. Continue to wean O2 as long as O2 sat is greater than or equal to 93% Continue with IV solumedrol today. Start PO prednisone tomorrow.  Xopenex and atrovent nebs.   
- complete 5 days of ABX 
- continue with anoro ellipta (uses Advair at home), singulair, and duoneb 
- scheduled tessalon perles for 3 days then PRN 
- continue with mucinex 
- continue with wellbutrin; pt agreed with importance of smoking cessation. Pt has been informed to use nicotine patch if he wishes to try 
  
HTN Hyperlipidemia 
- continue lisinopril, hctz, nifedipine 
- continue wellubtrin for smoke cessation 
- continue lipitor Hyponatremia 
- Na 131. Will continue to follow. Poor PO intake up until late yesterday afternoon. BMI 23.3 
  
Code: full code DVT prophylaxis:  lovenox Surrogate decision maker:  Discussed with patient, he appoints daughter Migue Siddiqui [phone] Nelia Marr [phone] as surrogate decision makers. He is  but  x 5 years and currently living with a girlfriend.  
  
Disposition: home with family Subjective: Chief Complaint / Reason for Physician Visit \"I am still not back to myself but I am breathing much better\". Then pt c/o of not have properly functioning TV. Maintenance team is following. Discussed with RN events overnight. Review of Systems: 
Symptom Y/N Comments  Symptom Y/N Comments Fever/Chills n   Chest Pain n   
Poor Appetite    Edema Cough    Abdominal Pain Sputum    Joint Pain SOB/BARCENAS y   Pruritis/Rash Nausea/vomit n   Tolerating PT/OT Diarrhea    Tolerating Diet Constipation    Other Could NOT obtain due to:   
 
Objective: VITALS:  
Last 24hrs VS reviewed since prior progress note. Most recent are: 
Patient Vitals for the past 24 hrs: 
 Temp Pulse Resp BP SpO2  
02/27/19 1032 97.9 °F (36.6 °C) 91 20 125/90 97 % 02/27/19 0826 97.9 °F (36.6 °C) 84 20 (!) 147/98 96 % 02/27/19 0400 97.6 °F (36.4 °C) 91 20 (!) 139/93 96 % 02/27/19 0000 98 °F (36.7 °C) 82 18 115/86 99 % 02/26/19 2010 97.8 °F (36.6 °C) (!) 123 26 (!) 165/92 99 % 02/26/19 1523 97.8 °F (36.6 °C) 94 20 147/89 92 % Intake/Output Summary (Last 24 hours) at 2/27/2019 1447 Last data filed at 2/27/2019 1210 Gross per 24 hour Intake 720 ml Output 1400 ml Net -680 ml PHYSICAL EXAM: 
 General: WD, WN. Alert, cooperative, no acute distress   
EENT:  EOMI. Anicteric sclerae. MMM Resp:  A few bilateral wheezing. No accessory muscle use CV:  Regular  rhythm,  No edema GI:  Soft, Non distended, Non tender.  +Bowel sounds Neurologic:  Alert and oriented X 3, normal speech, Psych:   Good insight. Not anxious nor agitated Skin:  No rashes. No jaundice Reviewed most current lab test results and cultures  YES Reviewed most current radiology test results   YES Review and summation of old records today    NO Reviewed patient's current orders and MAR    YES 
PMH/SH reviewed - no change compared to H&P 
________________________________________________________________________ Care Plan discussed with: 
  Comments Patient y Family RN y   
Care Manager y Consultant     
                 y Multidiciplinary team rounds were held today with , nursing, pharmacist and clinical coordinator. Patient's plan of care was discussed; medications were reviewed and discharge planning was addressed. ________________________________________________________________________ Total NON critical care TIME:  30   Minutes Total CRITICAL CARE TIME Spent:   Minutes non procedure based Comments >50% of visit spent in counseling and coordination of care    
________________________________________________________________________ Reji Urrutia NP Procedures: see electronic medical records for all procedures/Xrays and details which were not copied into this note but were reviewed prior to creation of Plan. LABS: 
I reviewed today's most current labs and imaging studies. Pertinent labs include: 
Recent Labs  
  02/26/19 
0444 02/25/19 
2185 WBC 5.0 10.3 HGB 12.3 13.6 HCT 36.1* 40.6  327 Recent Labs  
  02/27/19 
0356 02/26/19 
0444 02/25/19 
6863 * 133* 136  
K 4.3 4.1 3.6 CL 98 100 98 CO2 26 24 25 * 138* 144* BUN 25* 18 15 CREA 1.08 0.93 1.06  
CA 8.6 8.5 8.7 ALB  --   --  3.9 TBILI  --   --  0.2 SGOT  --   --  26 ALT  --   --  37 INR  --   --  1.0 Signed: )Toño Rodriguez, NP
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
## 2019-02-27 NOTE — CARDIO/PULMONARY
CP Rehab Note: chart review, consult acknowledged for pulmonary education Consult for pulmonary education was ordered. No materials for pulmonary education available at this time. Spoke with CP Rehab manager. Spoke with patient's nurse and provided information on pulmonary rehab program. MD note states patient does not live near an out patient program. 
 
Will discuss situation further with .

## 2019-02-28 LAB
ANION GAP SERPL CALC-SCNC: 6 MMOL/L (ref 5–15)
BUN SERPL-MCNC: 29 MG/DL (ref 6–20)
BUN/CREAT SERPL: 28 (ref 12–20)
CALCIUM SERPL-MCNC: 8.3 MG/DL (ref 8.5–10.1)
CHLORIDE SERPL-SCNC: 97 MMOL/L (ref 97–108)
CO2 SERPL-SCNC: 25 MMOL/L (ref 21–32)
CREAT SERPL-MCNC: 1.02 MG/DL (ref 0.7–1.3)
CREAT UR-MCNC: 66.4 MG/DL
ERYTHROCYTE [DISTWIDTH] IN BLOOD BY AUTOMATED COUNT: 13.4 % (ref 11.5–14.5)
GLUCOSE SERPL-MCNC: 127 MG/DL (ref 65–100)
HCT VFR BLD AUTO: 38.2 % (ref 36.6–50.3)
HGB BLD-MCNC: 13.3 G/DL (ref 12.1–17)
MCH RBC QN AUTO: 32.8 PG (ref 26–34)
MCHC RBC AUTO-ENTMCNC: 34.8 G/DL (ref 30–36.5)
MCV RBC AUTO: 94.1 FL (ref 80–99)
NRBC # BLD: 0 K/UL (ref 0–0.01)
NRBC BLD-RTO: 0 PER 100 WBC
OSMOLALITY UR: 441 MOSM/KG H2O
PLATELET # BLD AUTO: 342 K/UL (ref 150–400)
PMV BLD AUTO: 9 FL (ref 8.9–12.9)
POTASSIUM SERPL-SCNC: 4.3 MMOL/L (ref 3.5–5.1)
RBC # BLD AUTO: 4.06 M/UL (ref 4.1–5.7)
SODIUM SERPL-SCNC: 128 MMOL/L (ref 136–145)
SODIUM UR-SCNC: 48 MMOL/L
WBC # BLD AUTO: 8.8 K/UL (ref 4.1–11.1)

## 2019-02-28 PROCEDURE — 77010033678 HC OXYGEN DAILY

## 2019-02-28 PROCEDURE — 74011250636 HC RX REV CODE- 250/636: Performed by: INTERNAL MEDICINE

## 2019-02-28 PROCEDURE — 74011250637 HC RX REV CODE- 250/637: Performed by: HOSPITALIST

## 2019-02-28 PROCEDURE — 83935 ASSAY OF URINE OSMOLALITY: CPT

## 2019-02-28 PROCEDURE — 74011636637 HC RX REV CODE- 636/637: Performed by: NURSE PRACTITIONER

## 2019-02-28 PROCEDURE — 82570 ASSAY OF URINE CREATININE: CPT

## 2019-02-28 PROCEDURE — 84300 ASSAY OF URINE SODIUM: CPT

## 2019-02-28 PROCEDURE — 74011250637 HC RX REV CODE- 250/637: Performed by: INTERNAL MEDICINE

## 2019-02-28 PROCEDURE — 74011250637 HC RX REV CODE- 250/637: Performed by: NURSE PRACTITIONER

## 2019-02-28 PROCEDURE — 36415 COLL VENOUS BLD VENIPUNCTURE: CPT

## 2019-02-28 PROCEDURE — 94760 N-INVAS EAR/PLS OXIMETRY 1: CPT

## 2019-02-28 PROCEDURE — 80048 BASIC METABOLIC PNL TOTAL CA: CPT

## 2019-02-28 PROCEDURE — 85027 COMPLETE CBC AUTOMATED: CPT

## 2019-02-28 PROCEDURE — 65660000001 HC RM ICU INTERMED STEPDOWN

## 2019-02-28 RX ORDER — LEVOFLOXACIN 750 MG/1
750 TABLET ORAL EVERY 24 HOURS
Status: DISCONTINUED | OUTPATIENT
Start: 2019-03-01 | End: 2019-03-01 | Stop reason: HOSPADM

## 2019-02-28 RX ADMIN — UMECLIDINIUM BROMIDE AND VILANTEROL TRIFENATATE 1 PUFF: 62.5; 25 POWDER RESPIRATORY (INHALATION) at 09:06

## 2019-02-28 RX ADMIN — LEVOFLOXACIN 750 MG: 5 INJECTION, SOLUTION INTRAVENOUS at 10:25

## 2019-02-28 RX ADMIN — PREDNISONE 40 MG: 20 TABLET ORAL at 09:04

## 2019-02-28 RX ADMIN — APIXABAN 10 MG: 5 TABLET, FILM COATED ORAL at 09:02

## 2019-02-28 RX ADMIN — ATORVASTATIN CALCIUM 20 MG: 20 TABLET, FILM COATED ORAL at 09:04

## 2019-02-28 RX ADMIN — GUAIFENESIN AND DEXTROMETHORPHAN 10 ML: 100; 10 SYRUP ORAL at 06:58

## 2019-02-28 RX ADMIN — BENZONATATE 100 MG: 100 CAPSULE ORAL at 21:36

## 2019-02-28 RX ADMIN — BUPROPION HYDROCHLORIDE 150 MG: 150 TABLET, FILM COATED, EXTENDED RELEASE ORAL at 17:02

## 2019-02-28 RX ADMIN — BUPROPION HYDROCHLORIDE 150 MG: 150 TABLET, FILM COATED, EXTENDED RELEASE ORAL at 09:00

## 2019-02-28 RX ADMIN — MONTELUKAST 10 MG: 10 TABLET, FILM COATED ORAL at 09:00

## 2019-02-28 RX ADMIN — BENZONATATE 100 MG: 100 CAPSULE ORAL at 17:02

## 2019-02-28 RX ADMIN — BENZONATATE 100 MG: 100 CAPSULE ORAL at 09:01

## 2019-02-28 RX ADMIN — GUAIFENESIN AND DEXTROMETHORPHAN 10 ML: 100; 10 SYRUP ORAL at 01:00

## 2019-02-28 RX ADMIN — ASPIRIN 81 MG: 81 TABLET, COATED ORAL at 08:59

## 2019-02-28 RX ADMIN — GUAIFENESIN AND DEXTROMETHORPHAN 10 ML: 100; 10 SYRUP ORAL at 13:15

## 2019-02-28 RX ADMIN — APIXABAN 10 MG: 5 TABLET, FILM COATED ORAL at 17:02

## 2019-02-28 RX ADMIN — NIFEDIPINE 90 MG: 90 TABLET, FILM COATED, EXTENDED RELEASE ORAL at 08:58

## 2019-02-28 RX ADMIN — HYDROCHLOROTHIAZIDE 25 MG: 25 TABLET ORAL at 09:02

## 2019-02-28 RX ADMIN — LISINOPRIL 40 MG: 20 TABLET ORAL at 09:03

## 2019-02-28 NOTE — PROGRESS NOTES
Pharmacy IV to PO Conversion Program 
 
Medication: Levofloxacin Indication: Empiric tx for COPD exacerbation Impression/Plan: Will change from IV to PO per protocol. Change confirmed by Dr. Ulisses Delgado during patient care rounds Thanks Christine Patricio, [place] 
 
[email or web address]

## 2019-02-28 NOTE — PROGRESS NOTES
Hospitalist Progress Note NAME: Mart Dance :  1960 MRN:  [id number] Interim Hospital Summary: 59 y.o. male whom presented on 2019 with Assessment / Plan: 
Acute hypoxic and hypercapnic respiratory failure, POA Small bilateral PEs, POA, unprovoked first thromboembolic event COPD with acute exacerbation Tobacco abuse - patient in respiratory distress at [facility], wheezing, speaking only 2-3 words.  Improved with bipap.  At [facility] ER able to be on 4L O2 for 2 hours before coughing fit caused recurrent respiratory distress and needed to put back on bipap.  Initial ABG 7.3/46/50/23  81% FIO2 35% 
  
- CTA chest with small filling defects within branches of both pulmonary arteries, appear to represent small pulmonary emboli. There is evidence of bullous change involving both lungs. No areas of infiltration are identified. No pleural effusions are seen. 
  Duplex study (-) for DVT 
- appreciate CM's input. Lovenox changed to Eliquis 
- Echo result: 
· Estimated left ventricular ejection fraction is 61 - 65%. · Aortic valve is probably trileaflet. · Mitral valve non-specific thickening. Trace mitral valve regurgitation. 
  Troponin (-), NT pro- 
 Since PE is unprovoked, should f/u up with pcp to ensure cancer screening is up to date. - appreciate pulmonology input; continue bipap PRN, no BiPAP overnight. Currently on O2 @ 2L via n/c. Continue to wean O2 as long as O2 sat is greater than or equal to 93% O2 challenge test: dx for O2 needs COPD Sa02 90 % on room air AT REST. Sa02 85 % on room air DURING AMBULATION. Sa02 94 % on 2 Liters DURING AMBULATION. Sa02 94 % on 2 Liters AT REST/AFTER AMBULATION.   Continue with PO prednisone,  Xopenex and atrovent nebs.   
- complete 5 days of ABX 
- continue with anoro ellipta (uses Advair at home), singulair, and duoneb 
- scheduled tessalon perles for 3 days then PRN 
- continue with mucinex - continue with wellbutrin; pt agreed with importance of smoking cessation. Pt has been informed to use nicotine patch if he wishes to try 
  
HTN Hyperlipidemia 
- continue lisinopril, hctz, nifedipine 
- continue wellubtrin for smoke cessation 
- continue lipitor 
  
Hyponatremia 
- Na 128. Fluid restriction 1500ml/day. Urine lytes to be done HCTZ has been discontinued 
  
BMI 23.3 
  
Code: full code DVT prophylaxis:  lovenox Surrogate decision maker:  Discussed with patient, he appoints daughter Earl Jc [phone] Yamini Laughter [phone] as surrogate decision makers. He is  but  x 5 years and currently living with a girlfriend.  
  
Disposition: home with family 
  
  
 
 
Subjective: Chief Complaint / Reason for Physician Visit \"my breathing is getting better\". Discussed with RN events overnight. Review of Systems: 
Symptom Y/N Comments  Symptom Y/N Comments Fever/Chills n   Chest Pain n   
Poor Appetite    Edema Cough n   Abdominal Pain Sputum    Joint Pain SOB/BARCENAS y   Pruritis/Rash Nausea/vomit n   Tolerating PT/OT Diarrhea    Tolerating Diet Constipation    Other Could NOT obtain due to:   
 
Objective: VITALS:  
Last 24hrs VS reviewed since prior progress note. Most recent are: 
Patient Vitals for the past 24 hrs: 
 Temp Pulse Resp BP SpO2  
02/28/19 1505 97.6 °F (36.4 °C) 90 18 143/68 94 % 02/28/19 1103 97.7 °F (36.5 °C) 95 18 119/87 94 % 02/28/19 0858  100  132/89   
02/28/19 0723     96 % 02/28/19 0720 97.6 °F (36.4 °C) 96 18 (!) 155/103 96 % 02/28/19 0331 97.6 °F (36.4 °C) 93 20 151/82 94 % 02/27/19 2300 98.2 °F (36.8 °C) 85 20 (!) 137/91 95 % 02/27/19 1934 98.3 °F (36.8 °C) 88 20 130/84 95 % 02/27/19 1800     95 % Intake/Output Summary (Last 24 hours) at 2/28/2019 1554 Last data filed at 2/28/2019 3267 Gross per 24 hour Intake 600 ml Output 2875 ml Net -2275 ml PHYSICAL EXAM: 
General: WD, WN. Alert, cooperative, no acute distress   
EENT:  EOMI. Anicteric sclerae. MMM Resp:  A few exp wheezing, No accessory muscle use CV:  Regular  rhythm,  No edema GI:  Soft, Non distended, Non tender.  +Bowel sounds Neurologic:  Alert and oriented X 3, normal speech, Psych:   Good insight. Not anxious nor agitated Skin:  No rashes. No jaundice Reviewed most current lab test results and cultures  YES Reviewed most current radiology test results   YES Review and summation of old records today    NO Reviewed patient's current orders and MAR    YES 
PMH/SH reviewed - no change compared to H&P 
________________________________________________________________________ Care Plan discussed with: 
  Comments Patient y Family RN y   
Care Manager y Consultant     
                 y Multidiciplinary team rounds were held today with , nursing, pharmacist and clinical coordinator. Patient's plan of care was discussed; medications were reviewed and discharge planning was addressed. ________________________________________________________________________ Total NON critical care TIME:  30   Minutes Total CRITICAL CARE TIME Spent:   Minutes non procedure based Comments >50% of visit spent in counseling and coordination of care    
________________________________________________________________________ Reji Loredo NP Procedures: see electronic medical records for all procedures/Xrays and details which were not copied into this note but were reviewed prior to creation of Plan. LABS: 
I reviewed today's most current labs and imaging studies. Pertinent labs include: 
Recent Labs  
  02/28/19 
0103 02/26/19 
0444 WBC 8.8 5.0 HGB 13.3 12.3 HCT 38.2 36.1*  
 296 Recent Labs  
  02/28/19 
0103 02/27/19 
0356 02/26/19 0444 * 131* 133* K 4.3 4.3 4.1 CL 97 98 100 CO2 25 26 24 * 117* 138* BUN 29* 25* 18  
CREA 1.02 1.08 0.93  
CA 8.3* 8.6 8.5 Signed: )Carol Diaz NP

## 2019-02-28 NOTE — PROGRESS NOTES
Bedside and Verbal shift change report given to Saranya Bedoya RN (oncoming nurse). Report included the following information SBAR, Kardex, ED Summary, Procedure Summary, Intake/Output, MAR, Recent Results and Cardiac Rhythm (NSR). SHIFT SUMMARY: 
 
Pt tolerates 2L O2 at mid to high 90s throughout shift [address] NURSING NOTE Admission Date 2/25/2019 Admission Diagnosis Acute respiratory failure with hypoxia ([address] [J96.01] Pulmonary emboli ([address] [I26.99] Consults IP CONSULT TO HOSPITALIST 
IP CONSULT TO PULMONOLOGY Cardiac Monitoring [x] Yes [] No  
  
Purposeful Hourly Rounding [x] Yes   
Jess Score Total Score: 2 Jess score 3 or > [] Bed Alarm [] Avasys [] 1:1 sitter [] Patient refused (Signed refusal form in chart) Zane Score Zane Score: 21 Zane score 14 or < [] PMT consult [] Wound Care consult  
 []  Specialty bed  [] Nutrition consult Influenza Vaccine Received Flu Vaccine for Current Season (usually Sept-March): No  
 Patient/Guardian Refused (Notify MD): Yes Oxygen needs? [] Room air Oxygen @  []1L    [x]2L    []3L   []4L    []5L   []6L via NC Chronic home O2 use? [] Yes [x] No 
Perform O2 challenge test and document in progress note using smartphrase (.Homeoxygen) Last bowel movement Last Bowel Movement Date: 02/26/19 Urinary Catheter LDAs Peripheral IV 02/25/19 Left Hand (Active) Site Assessment Clean, dry, & intact 2/28/2019  3:38 AM  
Phlebitis Assessment 0 2/28/2019  3:38 AM  
Infiltration Assessment 0 2/28/2019  3:38 AM  
Dressing Status Clean, dry, & intact 2/28/2019  3:38 AM  
Dressing Type Transparent 2/28/2019  3:38 AM  
Hub Color/Line Status Pink;Flushed 2/28/2019  3:38 AM  
Alcohol Cap Used No 2/25/2019  1:49 PM  
                  
  
Readmission Risk Assessment Tool Score Low Risk   
      
 5 Total Score   
  
 5 Pt. Coverage (Medicare=5 , Medicaid, or Self-Pay=4) Criteria that do not apply: Has Seen PCP in Last 6 Months (Yes=3, No=0) . Living with Significant Other. Assisted Living. LTAC. SNF. or  
Rehab Patient Length of Stay (>5 days = 3) IP Visits Last 12 Months (1-3=4, 4=9, >4=11) Charlson Comorbidity Score (Age + Comorbid Conditions) Expected Length of Stay 4d 7h Actual Length of Stay 3
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
## 2019-02-28 NOTE — PROGRESS NOTES
0700: Received bedside report from [facility], off going nurse. Assumed care of patient. Problem: Falls - Risk of 
Goal: *Absence of Falls Document [place] Rank Fall Risk and appropriate interventions in the flowsheet. Outcome: Progressing Towards Goal 
Fall Risk Interventions: 
Mobility Interventions: Patient to call before getting OOB, PT Consult for mobility concerns Medication Interventions: Evaluate medications/consider consulting pharmacy, Patient to call before getting OOB, Teach patient to arise slowly Home Oxygen Test 
Date of test: 2/28 Time of test: 1000 Sa02 90 % on room air AT REST. Sa02 85 % on room air DURING AMBULATION. Sa02 94 % on 2 Liters DURING AMBULATION. Sa02 94 % on 2 Liters AT REST/AFTER AMBULATION. 1900: Bedside shift change report given to , RN (oncoming nurse). Report included the following information SBAR, Kardex, Intake/Output, MAR, Recent Results and Cardiac Rhythm NSR.  
 
SHIFT SUMMARY: 
 
 
CONCERNS TO ADDRESS WITH MD: 
 
 
 
[address] NURSING NOTE Admission Date 2/25/2019 Admission Diagnosis Acute respiratory failure with hypoxia ([facility] [J96.01] Pulmonary emboli ([address] [I26.99] Consults IP CONSULT TO HOSPITALIST 
IP CONSULT TO PULMONOLOGY Cardiac Monitoring [x] Yes [] No  
  
Purposeful Hourly Rounding [x] Yes   
Jess Score Total Score: 2 Jess score 3 or > [] Bed Alarm [] Avasys [] 1:1 sitter [] Patient refused (Signed refusal form in chart) Zane Score Zane Score: 21 Zane score 14 or < [] PMT consult [] Wound Care consult  
 []  Specialty bed  [] Nutrition consult Influenza Vaccine Received Flu Vaccine for Current Season (usually Sept-March): No  
 Patient/Guardian Refused (Notify MD): Yes Oxygen needs? [] Room air Oxygen @  [x]1L    []2L    []3L   []4L    []5L   []6L via NC Chronic home O2 use? [] Yes [x] No 
Perform O2 challenge test and document in progress note using smartphrase (.Homeoxygen) Last bowel movement Last Bowel Movement Date: 02/26/19 Urinary Catheter LDAs Peripheral IV 02/25/19 Left Hand (Active) Site Assessment Clean, dry, & intact 2/28/2019  3:07 PM  
Phlebitis Assessment 0 2/28/2019  3:07 PM  
Infiltration Assessment 0 2/28/2019  3:07 PM  
Dressing Status Clean, dry, & intact 2/28/2019  3:07 PM  
Dressing Type Transparent 2/28/2019  3:07 PM  
Hub Color/Line Status Pink;Flushed;Patent 2/28/2019  3:07 PM  
Alcohol Cap Used No 2/25/2019  1:49 PM  
                  
  
Readmission Risk Assessment Tool Score Low Risk   
      
 5 Total Score   
  
 5 Pt. Coverage (Medicare=5 , Medicaid, or Self-Pay=4) Criteria that do not apply:  
 Has Seen PCP in Last 6 Months (Yes=3, No=0) . Living with Significant Other. Assisted Living. LTAC. SNF. or  
Rehab Patient Length of Stay (>5 days = 3) IP Visits Last 12 Months (1-3=4, 4=9, >4=11) Charlson Comorbidity Score (Age + Comorbid Conditions) Expected Length of Stay 4d 7h Actual Length of Stay 3

## 2019-03-01 PROBLEM — Z72.0 TOBACCO ABUSE: Status: ACTIVE | Noted: 2019-03-01

## 2019-03-01 PROBLEM — J44.9 COPD (CHRONIC OBSTRUCTIVE PULMONARY DISEASE) (HCC): Status: ACTIVE | Noted: 2019-03-01

## 2019-03-01 LAB
ANION GAP SERPL CALC-SCNC: 9 MMOL/L (ref 5–15)
BUN SERPL-MCNC: 26 MG/DL (ref 6–20)
BUN/CREAT SERPL: 26 (ref 12–20)
CALCIUM SERPL-MCNC: 8.5 MG/DL (ref 8.5–10.1)
CHLORIDE SERPL-SCNC: 99 MMOL/L (ref 97–108)
CO2 SERPL-SCNC: 25 MMOL/L (ref 21–32)
CREAT SERPL-MCNC: 1 MG/DL (ref 0.7–1.3)
GLUCOSE SERPL-MCNC: 98 MG/DL (ref 65–100)
POTASSIUM SERPL-SCNC: 3.5 MMOL/L (ref 3.5–5.1)
SODIUM SERPL-SCNC: 133 MMOL/L (ref 136–145)

## 2019-03-01 PROCEDURE — 74011636637 HC RX REV CODE- 636/637: Performed by: NURSE PRACTITIONER

## 2019-03-01 PROCEDURE — 77010033678 HC OXYGEN DAILY

## 2019-03-01 PROCEDURE — 74011250637 HC RX REV CODE- 250/637: Performed by: INTERNAL MEDICINE

## 2019-03-01 PROCEDURE — 36415 COLL VENOUS BLD VENIPUNCTURE: CPT

## 2019-03-01 PROCEDURE — 77030027138 HC INCENT SPIROMETER -A

## 2019-03-01 PROCEDURE — 94760 N-INVAS EAR/PLS OXIMETRY 1: CPT

## 2019-03-01 PROCEDURE — 80048 BASIC METABOLIC PNL TOTAL CA: CPT

## 2019-03-01 PROCEDURE — 74011250637 HC RX REV CODE- 250/637: Performed by: HOSPITALIST

## 2019-03-01 PROCEDURE — 74011250637 HC RX REV CODE- 250/637: Performed by: NURSE PRACTITIONER

## 2019-03-01 RX ORDER — LEVOFLOXACIN 750 MG/1
750 TABLET ORAL EVERY 24 HOURS
Qty: 1 TAB | Refills: 0 | Status: SHIPPED | OUTPATIENT
Start: 2019-03-01

## 2019-03-01 RX ORDER — BENZONATATE 100 MG/1
100 CAPSULE ORAL
Qty: 21 CAP | Refills: 0 | Status: SHIPPED | OUTPATIENT
Start: 2019-03-01 | End: 2019-03-08

## 2019-03-01 RX ORDER — PREDNISONE 20 MG/1
40 TABLET ORAL
Qty: 7 TAB | Refills: 0 | Status: SHIPPED | OUTPATIENT
Start: 2019-03-01

## 2019-03-01 RX ADMIN — ATORVASTATIN CALCIUM 20 MG: 20 TABLET, FILM COATED ORAL at 08:58

## 2019-03-01 RX ADMIN — BENZONATATE 100 MG: 100 CAPSULE ORAL at 08:59

## 2019-03-01 RX ADMIN — APIXABAN 10 MG: 5 TABLET, FILM COATED ORAL at 09:02

## 2019-03-01 RX ADMIN — UMECLIDINIUM BROMIDE AND VILANTEROL TRIFENATATE 1 PUFF: 62.5; 25 POWDER RESPIRATORY (INHALATION) at 09:03

## 2019-03-01 RX ADMIN — MONTELUKAST 10 MG: 10 TABLET, FILM COATED ORAL at 08:58

## 2019-03-01 RX ADMIN — LISINOPRIL 40 MG: 20 TABLET ORAL at 08:59

## 2019-03-01 RX ADMIN — ASPIRIN 81 MG: 81 TABLET, COATED ORAL at 08:59

## 2019-03-01 RX ADMIN — BUPROPION HYDROCHLORIDE 150 MG: 150 TABLET, FILM COATED, EXTENDED RELEASE ORAL at 08:57

## 2019-03-01 RX ADMIN — PREDNISONE 40 MG: 20 TABLET ORAL at 09:00

## 2019-03-01 RX ADMIN — LEVOFLOXACIN 750 MG: 750 TABLET, FILM COATED ORAL at 10:22

## 2019-03-01 RX ADMIN — NIFEDIPINE 90 MG: 90 TABLET, FILM COATED, EXTENDED RELEASE ORAL at 08:56

## 2019-03-01 NOTE — PROGRESS NOTES
CM completed room visit with pt,and it was reported that he is in need of home o2 at d/c. Pt agreed that Home O2 is needed. CM will send referral to [address] for [address]. CM has informed pt's nurse of the following. UPDATE: 2:09PM 
 
CM informed that pt have received auth for Home O2, provided by [address]. Pt O2 will be delivered to [id number] [facility]. [employer], [facility] [place] [facility], [address] [facility]

## 2019-03-01 NOTE — PROGRESS NOTES
0700 
Bedside shift change report given to Davina Cloud RN (oncoming nurse) by Juli Stephenson RN (offgoing nurse). Report included the following information SBAR, Kardex, ED Summary, Intake/Output, MAR, Accordion, Recent Results, Med Rec Status and Cardiac Rhythm NSR.  
 
1044 Home Oxygen Test 
Date of test: 03/01/2019 Time of test: 1044 Sa02 90 % on room air AT REST. Sa02 76 % on room air DURING AMBULATION. Sa02 91 % on 1 Liters DURING AMBULATION. SaO2 93% on 2 liters DURING AMBULATION. Sa02 95 % on 2 Liters AT REST/AFTER AMBULATION. [facility] Pt had Oxygen delivered for home. [address] [place] DISCHARGE SUMMARY FROM NURSE The patient is stable for discharge. I have reviewed the discharge instructions with the patient. patient verbalized understanding. All questions were fully answered. patient denies any complaints. There were no personal belongings, valuables or home medications left at patients bedside,  or safe. Hard scripts and medication handouts were given and reviewed with the patient. Appropriate educational materials and medication side effects teaching were provided. Cardiac monitor and IV line(s) were removed.

## 2019-03-01 NOTE — DISCHARGE INSTRUCTIONS
Patient Discharge Instructions     Pt Name  Maxine Shelton   Date of Birth 1960   Age  59 y.o. Medical Record Number  [id number]   PCP Ru Nicole DO    Admit date:  2/25/2019 @    [place] [address]    Room Number  2284/01   Date of Discharge 3/1/2019     Admission Diagnoses:     Pulmonary emboli ([facility]        No Known Allergies     You were admitted to [address] for  Pulmonary emboli ([facility] [address]    YOUR OTHER MEDICAL DIAGNOSES INCLUDE (BUT NOT LIMITED TO ):  Present on Admission:   Pulmonary emboli (HCC)   Acute respiratory failure with hypoxia (HCC)   Tobacco abuse   COPD (chronic obstructive pulmonary disease) (HCC)      DIET:  Cardiac Diet       Recommended activity: {discharge activity:47030}  Follow up : Follow-up Information     Follow up With Specialties Details Why Contact Info    Ricardo Brown DO Family Practice  to be seen in one week [address] [place] [address]      Susana Campoverde MD Internal Medicine, Pulmonary Disease Schedule an appointment as soon as possible for a visit as soon as possible [address].  [phone]             Skilled nursing facility MD responsible for above upon discharge. · It is important that you take the medication exactly as they are prescribed. · Keep your medication in the bottles provided by the pharmacist and keep a list of the medication names, dosages, and times to be taken in your wallet. · Do not take other medications without consulting your doctor. ADDITIONAL INFORMATION: If you experience any of the following symptoms or have any health problem not listed below, then please call your primary care physician or return to the emergency room if you cannot get hold of your doctor: Fever, chills, nausea, vomiting, diarrhea, change in mentation, falling, bleeding, shortness of breath.       I understand that if any problems occur once I am discharged, I am supposed to call my Primary care physician for further care or seek help in the Emergency Department at the nearest Healthcare facility. I have had an opportunity to discuss my clinical issues with my doctor and nursing staff. I understand and acknowledge receipt of the above instructions.                                                                                                                                            Physician's or R.N.'s Signature                                                            Date/Time                                                                                                                                              Patient or Representative Signature                                                 Date/Time
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
## 2019-03-01 NOTE — ROUTINE PROCESS
Attempted to schedule PCP MIHAELA apt with Dr. Ilya Miller. Notified by practice that patient has never been seen there before.
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
## 2019-03-01 NOTE — DISCHARGE SUMMARY
Hospitalist Discharge Summary     Patient ID:  Moises Mejias  [id number]  85 y.o.  1960    PCP on record: Xenia Cooper DO    Admit date: 2/25/2019  Discharge date and time: 3/1/2019      DISCHARGE DIAGNOSIS:  Acute hypoxic and hypercapnic respiratory failure, POA  Small bilateral PEs, POA, unprovoked first thromboembolic event  COPD with acute exacerbation  Tobacco abuse  HTN  Hyperlipidemia  Hyponatremia  BMI 23.3          CONSULTATIONS:  IP CONSULT TO HOSPITALIST  IP CONSULT TO PULMONOLOGY    Excerpted HPI from H&P of Rachael Cota MD:  Moises Mejias is a 59 y.o.  male transferred from [facility] ER to [facility] ER for respiratory distress with acute hypoxic and hypercapnic respiratory failure due to copd exacerbation and small b/l PEs.    Patient has been treated for copd exacerbation at outside ER on 10/18 and 1/31/19 with steroid, nebs, abx. Not on home O2.  Just quit smoking 10 days ago; taking wellbutrin to help with this. On 2/23 developed SOB with exertion that improved with nebulizer treatment. However, next day, very short of breath walking to bathroom (about 5 feet). SOB worse when have coughing fits associated with sweats. Cough minimally productive with clear sputum, wheezing. No fever or chills or chest pain. No leg swelling or pain, no recent travel or surgery. No prior hx of PE or DVT. ______________________________________________________________________  DISCHARGE SUMMARY/HOSPITAL COURSE:  for full details see H&P, daily progress notes, labs, consult notes.      Acute hypoxic and hypercapnic respiratory failure, POA  Small bilateral PEs, POA, unprovoked first thromboembolic event  COPD with acute exacerbation  Tobacco abuse  - patient in respiratory distress at [facility], wheezing, speaking only 2-3 words.  Improved with bipap.  At [facility] ER able to be on 4L O2 for 2 hours before coughing fit caused recurrent respiratory distress and needed to put back on bipap.  Initial ABG 7.3/46/50/23  81% FIO2 35%     - CTA chest with small filling defects within branches of both pulmonary arteries, appear to represent small pulmonary emboli. There is evidence of bullous change involving both lungs. No areas of infiltration are identified. No pleural effusions are seen.    Duplex study (-) for DVT  - appreciate CM's input. Lovenox changed to Eliquis  - Echo result:  · Estimated left ventricular ejection fraction is 61 - 65%. · Aortic valve is probably trileaflet. · Mitral valve non-specific thickening. Trace mitral valve regurgitation.    Troponin (-), NT pro-   Since PE is unprovoked, should f/u up with pcp to ensure cancer screening is up to date. - appreciate pulmonology input; continue bipap PRN, no BiPAP overnight. Currently on O2 @ 2L via n/c. Continue to wean O2 as long as O2 sat is greater than or equal to 93%     O2 challenge test: dx for O2 needs COPD  Sa02 90 % on room air AT REST. Sa02 85 % on room air DURING AMBULATION. Sa02 94 % on 2 Liters DURING AMBULATION. Sa02 94 % on 2 Liters AT REST/AFTER AMBULATION.      Continue with PO prednisone,  Xopenex and atrovent nebs.    - complete 5 days of ABX with levo  - continue with symbicort, singulair, and duoneb  - scheduled tessalon perles for 3 days then PRN  - continue with mucinex  - continue with wellbutrin; pt agreed with importance of smoking cessation.      HTN  Hyperlipidemia  - continue lisinopril and nifedipine. HCTZ has been d/c'd due to hyponatremia  - continue wellubtrin for smoke cessation  - continue lipitor     Hyponatremia  - Na 133 from 128. Continue with Fluid restriction 1500ml/day. HCTZ has been discontinued     BMI 23.3            _______________________________________________________________________  Patient seen and examined by me on discharge day. Pertinent Findings:  Gen:    Not in distress  Chest: Clear lungs  CVS:   Regular rhythm.   No edema  Abd:  Soft, not distended, not tender  Neuro:  Alert, orient x4  _______________________________________________________________________  DISCHARGE MEDICATIONS:   Current Discharge Medication List      START taking these medications    Details   benzonatate (TESSALON) 100 mg capsule Take 1 Cap by mouth three (3) times daily as needed for Cough for up to 7 days. Qty: 21 Cap, Refills: 0      levoFLOXacin (LEVAQUIN) 750 mg tablet Take 1 Tab by mouth every twenty-four (24) hours. Qty: 1 Tab, Refills: 0      predniSONE (DELTASONE) 20 mg tablet Take 40 mg by mouth daily (with breakfast). 2 tablet once a day for 2 days,  1 tablet once a day for 2 days,  Then 1/2 tab once a day for 2 days  Qty: 7 Tab, Refills: 0      apixaban (ELIQUIS) 5 mg (74 tabs) starter pack Take 10 mg (two 5 mg tablets) by mouth twice a day for 7 days   Followed by 5 mg (one 5 mg tablet) by mouth twice a day  Qty: 1 Dose Pack, Refills: 0         CONTINUE these medications which have NOT CHANGED    Details   atorvastatin (LIPITOR) 20 mg tablet Take 20 mg by mouth daily. buPROPion SR (WELLBUTRIN SR) 150 mg SR tablet Take 150 mg by mouth two (2) times a day. albuterol (PROVENTIL VENTOLIN) 2.5 mg /3 mL (0.083 %) nebulizer solution 1.25 mg by Nebulization route every four (4) hours as needed for Wheezing. albuterol (VENTOLIN HFA) 90 mcg/actuation inhaler Take 2 Puffs by inhalation every six (6) hours as needed for Wheezing. aspirin delayed-release 81 mg tablet Take 81 mg by mouth daily. lisinopril (PRINIVIL, ZESTRIL) 40 mg tablet Take 40 mg by mouth daily. NIFEdipine ER (PROCARDIA XL) 90 mg ER tablet Take 90 mg by mouth daily. montelukast (SINGULAIR) 10 mg tablet Take 10 mg by mouth daily. budesonide-formoterol (SYMBICORT) 160-4.5 mcg/actuation HFAA Take 2 Puffs by inhalation two (2) times a day. inhalational spacing device (AEROCHAMBER MINI) 1 Each by Does Not Apply route as needed.   Qty: 1 Device, Refills: 0         STOP taking these medications       hydroCHLOROthiazide (HYDRODIURIL) 25 mg tablet Comments:   Reason for Stopping:         naproxen sodium (ANAPROX) 550 mg tablet Comments:   Reason for Stopping:               My Recommended Diet, Activity, Wound Care, and follow-up labs are listed in the patient's Discharge Insturctions which I have personally completed and reviewed.     _______________________________________________________________________  DISPOSITION:    Home with Family: y   Home with HH/PT/OT/RN:    SNF/LTC:    IRENE:    OTHER:        Condition at Discharge:  Stable  _______________________________________________________________________  Follow up with:   PCP : Issac Gale DO  Follow-up Information     Follow up With Specialties Details Why Contact Brandy Richmond,  Family Practice  to be seen in one week [address]      Sallie Tao MD Internal Medicine, Pulmonary Disease Schedule an appointment as soon as possible for a visit as soon as possible [address]  [phone]                Total time in minutes spent coordinating this discharge (includes going over instructions, follow-up, prescriptions, and preparing report for sign off to her PCP) :  31 minutes    Signed:  Reji Espinoza NP

## 2019-03-01 NOTE — PROGRESS NOTES
Problem: Falls - Risk of 
Goal: *Absence of Falls Document Oralex [place] Fall Risk and appropriate interventions in the flowsheet. Outcome: Progressing Towards Goal 
Fall Risk Interventions: 
Mobility Interventions: Bed/chair exit alarm, OT consult for ADLs, PT Consult for mobility concerns, PT Consult for assist device competence, Utilize walker, cane, or other assistive device Medication Interventions: Patient to call before getting OOB, Teach patient to arise slowly Bedside and Verbal shift change report given to , RN (oncoming nurse). Report included the following information Kardex. SHIFT SUMMARY: 
1500 cc FR maintained I have reviewed with pt and he understands and is following CONCERNS TO ADDRESS WITH MD 
Pt says he feels \"congestion in his upper chest,sometimes  but is unable to cough it up\" [facility] NURSING NOTE Admission Date 2/25/2019 Admission Diagnosis Acute respiratory failure with hypoxia ([address] [J96.01] Pulmonary emboli ([address] [I26.99] Consults IP CONSULT TO HOSPITALIST 
IP CONSULT TO PULMONOLOGY Cardiac Monitoring [x] Yes [] No  
  
Purposeful Hourly Rounding [x] Yes   
Jess Score Total Score: 2 Jess score 3 or > [x] Bed Alarm [] Avasys [] 1:1 sitter [] Patient refused (Signed refusal form in chart) Zane Score Zane Score: 21 Zane score 14 or < [] PMT consult [] Wound Care consult  
 []  Specialty bed  [] Nutrition consult Influenza Vaccine Received Flu Vaccine for Current Season (usually Sept-March): No  
 Patient/Guardian Refused (Notify MD): Yes Oxygen needs? [] Room air Oxygen @  [x]1L    []2L    []3L   []4L    []5L   []6L via NC Chronic home O2 use? [] Yes [x] No   But will go home with 1 liter Perform O2 challenge test and document in progress note using smartphrase (.Homeoxygen) Last bowel movement Last Bowel Movement Date: 02/26/19 Urinary Catheter LDAs Peripheral IV 02/25/19 Left Hand (Active) Site Assessment Clean, dry, & intact 3/1/2019  3:49 AM  
Phlebitis Assessment 0 3/1/2019  3:49 AM  
Infiltration Assessment 0 3/1/2019  3:49 AM  
Dressing Status Clean, dry, & intact 3/1/2019  3:49 AM  
Dressing Type Tape;Transparent 3/1/2019  3:49 AM  
Hub Color/Line Status Pink;Flushed;Patent 2/28/2019  3:07 PM  
Alcohol Cap Used No 2/25/2019  1:49 PM  
                  
  
Readmission Risk Assessment Tool Score Low Risk   
      
 5 Total Score   
  
 5 Pt. Coverage (Medicare=5 , Medicaid, or Self-Pay=4) Criteria that do not apply:  
 Has Seen PCP in Last 6 Months (Yes=3, No=0) . Living with Significant Other. Assisted Living. LTAC. SNF. or  
Rehab Patient Length of Stay (>5 days = 3) IP Visits Last 12 Months (1-3=4, 4=9, >4=11) Charlson Comorbidity Score (Age + Comorbid Conditions) Expected Length of Stay 4d 7h Actual Length of Stay 4

## 2019-03-02 VITALS
WEIGHT: 158 LBS | DIASTOLIC BLOOD PRESSURE: 87 MMHG | RESPIRATION RATE: 13 BRPM | HEIGHT: 69 IN | HEART RATE: 101 BPM | SYSTOLIC BLOOD PRESSURE: 123 MMHG | TEMPERATURE: 97.5 F | OXYGEN SATURATION: 93 % | BODY MASS INDEX: 23.4 KG/M2

## 2019-04-02 ENCOUNTER — HOSPITAL ENCOUNTER (OUTPATIENT)
Dept: CARDIAC REHAB | Age: 59
End: 2019-04-02

## 2022-03-18 PROBLEM — J44.9 COPD (CHRONIC OBSTRUCTIVE PULMONARY DISEASE) (HCC): Status: ACTIVE | Noted: 2019-03-01

## 2022-03-19 PROBLEM — Z72.0 TOBACCO ABUSE: Status: ACTIVE | Noted: 2019-03-01

## 2022-03-19 PROBLEM — I26.99 PULMONARY EMBOLI (HCC): Status: ACTIVE | Noted: 2019-02-25

## 2022-03-20 PROBLEM — J96.01 ACUTE RESPIRATORY FAILURE WITH HYPOXIA (HCC): Status: ACTIVE | Noted: 2019-02-25

## 2024-04-22 ENCOUNTER — HOSPITAL ENCOUNTER (INPATIENT)
Facility: HOSPITAL | Age: 64
LOS: 2 days | Discharge: HOME | DRG: 190 | End: 2024-04-24
Attending: EMERGENCY MEDICINE | Admitting: INTERNAL MEDICINE
Payer: MEDICARE

## 2024-04-22 ENCOUNTER — APPOINTMENT (OUTPATIENT)
Facility: HOSPITAL | Age: 64
DRG: 190 | End: 2024-04-22
Payer: MEDICARE

## 2024-04-22 DIAGNOSIS — J96.02 ACUTE RESPIRATORY FAILURE WITH HYPERCAPNIA (HCC): ICD-10-CM

## 2024-04-22 DIAGNOSIS — J44.1 COPD EXACERBATION (HCC): Primary | ICD-10-CM

## 2024-04-22 PROBLEM — J96.90 RESPIRATORY FAILURE (HCC): Status: ACTIVE | Noted: 2024-04-22

## 2024-04-22 PROBLEM — E78.5 HYPERLIPIDEMIA: Chronic | Status: ACTIVE | Noted: 2024-04-22

## 2024-04-22 PROBLEM — K21.9 GERD (GASTROESOPHAGEAL REFLUX DISEASE): Chronic | Status: ACTIVE | Noted: 2024-04-22

## 2024-04-22 LAB
ALBUMIN SERPL-MCNC: 4 G/DL (ref 3.5–5)
ALBUMIN/GLOB SERPL: 1.1 (ref 1.1–2.2)
ALP SERPL-CCNC: 74 U/L (ref 45–117)
ALT SERPL-CCNC: 19 U/L (ref 12–78)
ANION GAP SERPL CALC-SCNC: 11 MMOL/L (ref 5–15)
ARTERIAL PATENCY WRIST A: YES
ARTERIAL PATENCY WRIST A: YES
AST SERPL-CCNC: 46 U/L (ref 15–37)
BASE DEFICIT BLDA-SCNC: 2.3 MMOL/L
BASE DEFICIT BLDA-SCNC: 3.9 MMOL/L
BASOPHILS # BLD: 0.1 K/UL (ref 0–0.1)
BASOPHILS NFR BLD: 0 % (ref 0–1)
BDY SITE: ABNORMAL
BDY SITE: ABNORMAL
BILIRUB SERPL-MCNC: 0.5 MG/DL (ref 0.2–1)
BUN SERPL-MCNC: 14 MG/DL (ref 6–20)
BUN/CREAT SERPL: 24 (ref 12–20)
CALCIUM SERPL-MCNC: 9 MG/DL (ref 8.5–10.1)
CHLORIDE SERPL-SCNC: 100 MMOL/L (ref 97–108)
CO2 SERPL-SCNC: 29 MMOL/L (ref 21–32)
CREAT SERPL-MCNC: 0.58 MG/DL (ref 0.7–1.3)
DIFFERENTIAL METHOD BLD: ABNORMAL
EOSINOPHIL # BLD: 0.4 K/UL (ref 0–0.4)
EOSINOPHIL NFR BLD: 2 % (ref 0–7)
ERYTHROCYTE [DISTWIDTH] IN BLOOD BY AUTOMATED COUNT: 13.5 % (ref 11.5–14.5)
FIO2 ON VENT: 50 %
FLUAV RNA SPEC QL NAA+PROBE: NOT DETECTED
FLUBV RNA SPEC QL NAA+PROBE: NOT DETECTED
GAS FLOW.O2 O2 DELIVERY SYS: 2 L/MIN
GAS FLOW.O2 SETTING OXYMISER: 8
GLOBULIN SER CALC-MCNC: 3.8 G/DL (ref 2–4)
GLUCOSE SERPL-MCNC: 126 MG/DL (ref 65–100)
HCO3 BLDA-SCNC: 22 MMOL/L (ref 22–26)
HCO3 BLDA-SCNC: 27 MMOL/L (ref 22–26)
HCT VFR BLD AUTO: 40 % (ref 36.6–50.3)
HGB BLD-MCNC: 13.5 G/DL (ref 12.1–17)
IMM GRANULOCYTES # BLD AUTO: 0.1 K/UL (ref 0–0.04)
IMM GRANULOCYTES NFR BLD AUTO: 1 % (ref 0–0.5)
IPAP/PIP: 16
LACTATE SERPL-SCNC: 0.9 MMOL/L (ref 0.4–2)
LYMPHOCYTES # BLD: 3.4 K/UL (ref 0.8–3.5)
LYMPHOCYTES NFR BLD: 15 % (ref 12–49)
MAGNESIUM SERPL-MCNC: 1.7 MG/DL (ref 1.6–2.4)
MCH RBC QN AUTO: 31.5 PG (ref 26–34)
MCHC RBC AUTO-ENTMCNC: 33.8 G/DL (ref 30–36.5)
MCV RBC AUTO: 93.2 FL (ref 80–99)
MONOCYTES # BLD: 1.6 K/UL (ref 0–1)
MONOCYTES NFR BLD: 7 % (ref 5–13)
NEUTS SEG # BLD: 17.9 K/UL (ref 1.8–8)
NEUTS SEG NFR BLD: 75 % (ref 32–75)
NRBC # BLD: 0 K/UL (ref 0–0.01)
NRBC BLD-RTO: 0 PER 100 WBC
NT PRO BNP: 46 PG/ML (ref 0–125)
PCO2 BLDA: 41 MMHG (ref 35–45)
PCO2 BLDA: 67 MMHG (ref 35–45)
PEEP RESPIRATORY: 6
PH BLDA: 7.22 (ref 7.35–7.45)
PH BLDA: 7.34 (ref 7.35–7.45)
PLATELET # BLD AUTO: 312 K/UL (ref 150–400)
PMV BLD AUTO: 10 FL (ref 8.9–12.9)
PO2 BLDA: 177 MMHG (ref 80–100)
PO2 BLDA: 80 MMHG (ref 80–100)
POTASSIUM SERPL-SCNC: 5.7 MMOL/L (ref 3.5–5.1)
PROT SERPL-MCNC: 7.8 G/DL (ref 6.4–8.2)
RBC # BLD AUTO: 4.29 M/UL (ref 4.1–5.7)
SAO2 % BLD: 93 % (ref 92–97)
SAO2 % BLD: 99 % (ref 92–97)
SAO2% DEVICE SAO2% SENSOR NAME: ABNORMAL
SAO2% DEVICE SAO2% SENSOR NAME: ABNORMAL
SARS-COV-2 RNA RESP QL NAA+PROBE: NOT DETECTED
SERVICE CMNT-IMP: ABNORMAL
SODIUM SERPL-SCNC: 140 MMOL/L (ref 136–145)
SPECIMEN SITE: ABNORMAL
SPECIMEN SITE: ABNORMAL
TROPONIN I SERPL HS-MCNC: 68 NG/L (ref 0–76)
WBC # BLD AUTO: 23.5 K/UL (ref 4.1–11.1)

## 2024-04-22 PROCEDURE — 93005 ELECTROCARDIOGRAM TRACING: CPT | Performed by: EMERGENCY MEDICINE

## 2024-04-22 PROCEDURE — 6360000002 HC RX W HCPCS: Performed by: INTERNAL MEDICINE

## 2024-04-22 PROCEDURE — 2700000000 HC OXYGEN THERAPY PER DAY

## 2024-04-22 PROCEDURE — 80053 COMPREHEN METABOLIC PANEL: CPT

## 2024-04-22 PROCEDURE — 84484 ASSAY OF TROPONIN QUANT: CPT

## 2024-04-22 PROCEDURE — 2580000003 HC RX 258: Performed by: INTERNAL MEDICINE

## 2024-04-22 PROCEDURE — 94762 N-INVAS EAR/PLS OXIMTRY CONT: CPT

## 2024-04-22 PROCEDURE — 85025 COMPLETE CBC W/AUTO DIFF WBC: CPT

## 2024-04-22 PROCEDURE — 71045 X-RAY EXAM CHEST 1 VIEW: CPT

## 2024-04-22 PROCEDURE — 99285 EMERGENCY DEPT VISIT HI MDM: CPT

## 2024-04-22 PROCEDURE — 94640 AIRWAY INHALATION TREATMENT: CPT

## 2024-04-22 PROCEDURE — 36600 WITHDRAWAL OF ARTERIAL BLOOD: CPT

## 2024-04-22 PROCEDURE — 84145 PROCALCITONIN (PCT): CPT

## 2024-04-22 PROCEDURE — 83880 ASSAY OF NATRIURETIC PEPTIDE: CPT

## 2024-04-22 PROCEDURE — 1100000003 HC PRIVATE W/ TELEMETRY

## 2024-04-22 PROCEDURE — 83735 ASSAY OF MAGNESIUM: CPT

## 2024-04-22 PROCEDURE — 36415 COLL VENOUS BLD VENIPUNCTURE: CPT

## 2024-04-22 PROCEDURE — 83605 ASSAY OF LACTIC ACID: CPT

## 2024-04-22 PROCEDURE — 87040 BLOOD CULTURE FOR BACTERIA: CPT

## 2024-04-22 PROCEDURE — 96365 THER/PROPH/DIAG IV INF INIT: CPT

## 2024-04-22 PROCEDURE — 87636 SARSCOV2 & INF A&B AMP PRB: CPT

## 2024-04-22 PROCEDURE — 6360000002 HC RX W HCPCS: Performed by: EMERGENCY MEDICINE

## 2024-04-22 PROCEDURE — 2500000003 HC RX 250 WO HCPCS: Performed by: EMERGENCY MEDICINE

## 2024-04-22 PROCEDURE — 96367 TX/PROPH/DG ADDL SEQ IV INF: CPT

## 2024-04-22 PROCEDURE — 96375 TX/PRO/DX INJ NEW DRUG ADDON: CPT

## 2024-04-22 PROCEDURE — 2580000003 HC RX 258: Performed by: EMERGENCY MEDICINE

## 2024-04-22 PROCEDURE — 6370000000 HC RX 637 (ALT 250 FOR IP): Performed by: INTERNAL MEDICINE

## 2024-04-22 PROCEDURE — 6370000000 HC RX 637 (ALT 250 FOR IP): Performed by: EMERGENCY MEDICINE

## 2024-04-22 PROCEDURE — 82803 BLOOD GASES ANY COMBINATION: CPT

## 2024-04-22 RX ORDER — ALBUTEROL SULFATE 2.5 MG/3ML
SOLUTION RESPIRATORY (INHALATION)
Status: ON HOLD | COMMUNITY
Start: 2019-06-20 | End: 2024-04-23

## 2024-04-22 RX ORDER — SODIUM CHLORIDE 9 MG/ML
INJECTION, SOLUTION INTRAVENOUS CONTINUOUS
Status: DISCONTINUED | OUTPATIENT
Start: 2024-04-22 | End: 2024-04-23

## 2024-04-22 RX ORDER — POLYETHYLENE GLYCOL 3350 17 G/17G
17 POWDER, FOR SOLUTION ORAL DAILY PRN
Status: DISCONTINUED | OUTPATIENT
Start: 2024-04-22 | End: 2024-04-24 | Stop reason: HOSPADM

## 2024-04-22 RX ORDER — ACETAMINOPHEN 325 MG/1
650 TABLET ORAL EVERY 6 HOURS PRN
Status: DISCONTINUED | OUTPATIENT
Start: 2024-04-22 | End: 2024-04-24 | Stop reason: HOSPADM

## 2024-04-22 RX ORDER — ENOXAPARIN SODIUM 100 MG/ML
40 INJECTION SUBCUTANEOUS DAILY
Status: DISCONTINUED | OUTPATIENT
Start: 2024-04-22 | End: 2024-04-24 | Stop reason: HOSPADM

## 2024-04-22 RX ORDER — BUDESONIDE 0.5 MG/2ML
0.5 INHALANT ORAL
Status: DISCONTINUED | OUTPATIENT
Start: 2024-04-22 | End: 2024-04-24 | Stop reason: HOSPADM

## 2024-04-22 RX ORDER — NIFEDIPINE 30 MG/1
90 TABLET, EXTENDED RELEASE ORAL DAILY
Status: DISCONTINUED | OUTPATIENT
Start: 2024-04-22 | End: 2024-04-24 | Stop reason: HOSPADM

## 2024-04-22 RX ORDER — ONDANSETRON 4 MG/1
4 TABLET, ORALLY DISINTEGRATING ORAL EVERY 8 HOURS PRN
Status: DISCONTINUED | OUTPATIENT
Start: 2024-04-22 | End: 2024-04-24 | Stop reason: HOSPADM

## 2024-04-22 RX ORDER — ROSUVASTATIN CALCIUM 20 MG/1
20 TABLET, COATED ORAL NIGHTLY
Status: DISCONTINUED | OUTPATIENT
Start: 2024-04-22 | End: 2024-04-24 | Stop reason: HOSPADM

## 2024-04-22 RX ORDER — ARFORMOTEROL TARTRATE 15 UG/2ML
15 SOLUTION RESPIRATORY (INHALATION)
Status: DISCONTINUED | OUTPATIENT
Start: 2024-04-22 | End: 2024-04-24 | Stop reason: HOSPADM

## 2024-04-22 RX ORDER — SODIUM CHLORIDE 9 MG/ML
INJECTION, SOLUTION INTRAVENOUS PRN
Status: DISCONTINUED | OUTPATIENT
Start: 2024-04-22 | End: 2024-04-24 | Stop reason: HOSPADM

## 2024-04-22 RX ORDER — IPRATROPIUM BROMIDE AND ALBUTEROL SULFATE 2.5; .5 MG/3ML; MG/3ML
SOLUTION RESPIRATORY (INHALATION)
Status: DISCONTINUED
Start: 2024-04-22 | End: 2024-04-22

## 2024-04-22 RX ORDER — ALBUTEROL SULFATE 2.5 MG/3ML
2.5 SOLUTION RESPIRATORY (INHALATION) EVERY 4 HOURS PRN
Status: DISCONTINUED | OUTPATIENT
Start: 2024-04-22 | End: 2024-04-24 | Stop reason: HOSPADM

## 2024-04-22 RX ORDER — MAGNESIUM SULFATE HEPTAHYDRATE 40 MG/ML
2000 INJECTION, SOLUTION INTRAVENOUS ONCE
Status: COMPLETED | OUTPATIENT
Start: 2024-04-22 | End: 2024-04-22

## 2024-04-22 RX ORDER — ALBUTEROL SULFATE 2.5 MG/3ML
10 SOLUTION RESPIRATORY (INHALATION)
Status: COMPLETED | OUTPATIENT
Start: 2024-04-22 | End: 2024-04-22

## 2024-04-22 RX ORDER — SODIUM CHLORIDE 0.9 % (FLUSH) 0.9 %
5-40 SYRINGE (ML) INJECTION PRN
Status: DISCONTINUED | OUTPATIENT
Start: 2024-04-22 | End: 2024-04-24 | Stop reason: HOSPADM

## 2024-04-22 RX ORDER — ONDANSETRON 2 MG/ML
4 INJECTION INTRAMUSCULAR; INTRAVENOUS EVERY 6 HOURS PRN
Status: DISCONTINUED | OUTPATIENT
Start: 2024-04-22 | End: 2024-04-24 | Stop reason: HOSPADM

## 2024-04-22 RX ORDER — LOSARTAN POTASSIUM 100 MG/1
100 TABLET ORAL DAILY
Status: DISCONTINUED | OUTPATIENT
Start: 2024-04-22 | End: 2024-04-24 | Stop reason: HOSPADM

## 2024-04-22 RX ORDER — ACETAMINOPHEN 650 MG/1
650 SUPPOSITORY RECTAL EVERY 6 HOURS PRN
Status: DISCONTINUED | OUTPATIENT
Start: 2024-04-22 | End: 2024-04-24 | Stop reason: HOSPADM

## 2024-04-22 RX ORDER — ASPIRIN 81 MG/1
81 TABLET ORAL DAILY
Status: DISCONTINUED | OUTPATIENT
Start: 2024-04-22 | End: 2024-04-24 | Stop reason: HOSPADM

## 2024-04-22 RX ORDER — IPRATROPIUM BROMIDE AND ALBUTEROL SULFATE 2.5; .5 MG/3ML; MG/3ML
1 SOLUTION RESPIRATORY (INHALATION)
Status: DISCONTINUED | OUTPATIENT
Start: 2024-04-23 | End: 2024-04-24 | Stop reason: HOSPADM

## 2024-04-22 RX ORDER — PANTOPRAZOLE SODIUM 40 MG/1
40 TABLET, DELAYED RELEASE ORAL
Status: DISCONTINUED | OUTPATIENT
Start: 2024-04-23 | End: 2024-04-24 | Stop reason: HOSPADM

## 2024-04-22 RX ORDER — IPRATROPIUM BROMIDE AND ALBUTEROL SULFATE 2.5; .5 MG/3ML; MG/3ML
1 SOLUTION RESPIRATORY (INHALATION)
Status: COMPLETED | OUTPATIENT
Start: 2024-04-22 | End: 2024-04-22

## 2024-04-22 RX ORDER — SODIUM CHLORIDE 0.9 % (FLUSH) 0.9 %
5-40 SYRINGE (ML) INJECTION EVERY 12 HOURS SCHEDULED
Status: DISCONTINUED | OUTPATIENT
Start: 2024-04-22 | End: 2024-04-24 | Stop reason: HOSPADM

## 2024-04-22 RX ADMIN — IPRATROPIUM BROMIDE 0.5 MG: 0.5 SOLUTION RESPIRATORY (INHALATION) at 16:22

## 2024-04-22 RX ADMIN — AZITHROMYCIN MONOHYDRATE 500 MG: 500 INJECTION, POWDER, LYOPHILIZED, FOR SOLUTION INTRAVENOUS at 18:07

## 2024-04-22 RX ADMIN — ENOXAPARIN SODIUM 40 MG: 100 INJECTION SUBCUTANEOUS at 22:52

## 2024-04-22 RX ADMIN — NIFEDIPINE 90 MG: 30 TABLET, EXTENDED RELEASE ORAL at 21:56

## 2024-04-22 RX ADMIN — MAGNESIUM SULFATE HEPTAHYDRATE 2000 MG: 40 INJECTION, SOLUTION INTRAVENOUS at 16:06

## 2024-04-22 RX ADMIN — ASPIRIN 81 MG: 81 TABLET, COATED ORAL at 21:55

## 2024-04-22 RX ADMIN — ARFORMOTEROL TARTRATE 15 MCG: 15 SOLUTION RESPIRATORY (INHALATION) at 21:24

## 2024-04-22 RX ADMIN — WATER 80 MG: 1 INJECTION INTRAMUSCULAR; INTRAVENOUS; SUBCUTANEOUS at 23:20

## 2024-04-22 RX ADMIN — SODIUM CHLORIDE, PRESERVATIVE FREE 10 ML: 5 INJECTION INTRAVENOUS at 21:56

## 2024-04-22 RX ADMIN — ROSUVASTATIN CALCIUM 20 MG: 20 TABLET, FILM COATED ORAL at 21:55

## 2024-04-22 RX ADMIN — BUDESONIDE INHALATION 500 MCG: 0.5 SUSPENSION RESPIRATORY (INHALATION) at 21:24

## 2024-04-22 RX ADMIN — WATER 125 MG: 1 INJECTION INTRAMUSCULAR; INTRAVENOUS; SUBCUTANEOUS at 16:01

## 2024-04-22 RX ADMIN — SODIUM CHLORIDE: 9 INJECTION, SOLUTION INTRAVENOUS at 22:02

## 2024-04-22 RX ADMIN — WATER 1000 MG: 1 INJECTION INTRAMUSCULAR; INTRAVENOUS; SUBCUTANEOUS at 21:54

## 2024-04-22 RX ADMIN — ALBUTEROL SULFATE 10 MG/HR: 2.5 SOLUTION RESPIRATORY (INHALATION) at 16:22

## 2024-04-22 RX ADMIN — LOSARTAN POTASSIUM 100 MG: 100 TABLET, FILM COATED ORAL at 21:55

## 2024-04-22 RX ADMIN — IPRATROPIUM BROMIDE AND ALBUTEROL SULFATE 1 DOSE: .5; 2.5 SOLUTION RESPIRATORY (INHALATION) at 15:45

## 2024-04-22 ASSESSMENT — LIFESTYLE VARIABLES
HOW OFTEN DO YOU HAVE A DRINK CONTAINING ALCOHOL: NEVER
HOW MANY STANDARD DRINKS CONTAINING ALCOHOL DO YOU HAVE ON A TYPICAL DAY: PATIENT DOES NOT DRINK

## 2024-04-22 NOTE — ED TRIAGE NOTES
Acute onset of SOB just prior to arrival , tri podding and grunting on arrival , given 2 duo nebs,  RT paged , blood gas obtained   uses 2 lpm/nc at home. Labs drawn

## 2024-04-22 NOTE — ED NOTES
Admission SBAR Note  Situation/Background:     Patient is being transferred to [facility] ([facility]), Room# 124    Patient's Chief Complaint was SOB and is admitted for respiratory distress.    CODE STATUS: Full  CSSRS: 0 - No Risk    ISOLATION/PRECAUTIONS: No    Called outstanding consults: Yes    STAT labs collected: Yes    Repeat Lactic Acid DUE? No    All STAT orders are complete: Yes    The following personal items will be sent with the patient during transfer to the floor:     All valuables: none       ASSESSMENT:    NEURO:   NIH SCORE: 0,1-4,5-15,15-20,21-42: 0     Is patient impulsive? No  Is patient oriented? Yes  Do they follow commands? Yes  Is the patient ambulatory? Yes    FALL RISK? Yes      RESPIRATORY:   Is patient on oxygen? Yes NC 2L  CARDIAC:   Is cardiac monitoring ordered? Yes    Last Rhythm: Rhythm including paccardio: Sinus Tachycardia 108  Patient to transfer with tele box on? Yes  Infusions: Meds; iv fluids: none ABX  LINE ACCESS: 20G Peripheral IV ,       /GI:   Continent Bowel/Bladder? Yes    INTEGUMENTARY:  IS THE PATIENT UNDRESSED? Yes  ARE THERE WOUNDS PRESENT? No  ARE THE WOUNDS DOCUMENTED?     RESTRAINTS IN USE: No      Vital Signs:  MEWS Score: 5/ Level of Consciousness: Alert (0)    Vitals:    04/22/24 1600 04/22/24 1615 04/22/24 1625 04/22/24 1626   BP: (!) 153/85 118/87     Pulse: (!) 130 (!) 119 (!) 108    Resp: 25 17     Temp:       TempSrc:       SpO2: 100% 100% 100%    Weight:    67.5 kg (148 lb 12.8 oz)          Pain 1: 0
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
## 2024-04-23 LAB
ANION GAP SERPL CALC-SCNC: 12 MMOL/L (ref 5–15)
BASOPHILS # BLD: 0 K/UL (ref 0–0.1)
BASOPHILS NFR BLD: 0 % (ref 0–1)
BUN SERPL-MCNC: 20 MG/DL (ref 6–20)
BUN/CREAT SERPL: 20 (ref 12–20)
CALCIUM SERPL-MCNC: 8.8 MG/DL (ref 8.5–10.1)
CHLORIDE SERPL-SCNC: 102 MMOL/L (ref 97–108)
CO2 SERPL-SCNC: 26 MMOL/L (ref 21–32)
CREAT SERPL-MCNC: 0.98 MG/DL (ref 0.7–1.3)
DIFFERENTIAL METHOD BLD: ABNORMAL
EOSINOPHIL # BLD: 0 K/UL (ref 0–0.4)
EOSINOPHIL NFR BLD: 0 % (ref 0–7)
ERYTHROCYTE [DISTWIDTH] IN BLOOD BY AUTOMATED COUNT: 13.5 % (ref 11.5–14.5)
GLUCOSE SERPL-MCNC: 168 MG/DL (ref 65–100)
HCT VFR BLD AUTO: 33.3 % (ref 36.6–50.3)
HGB BLD-MCNC: 11.4 G/DL (ref 12.1–17)
IMM GRANULOCYTES # BLD AUTO: 0 K/UL (ref 0–0.04)
IMM GRANULOCYTES NFR BLD AUTO: 1 % (ref 0–0.5)
LYMPHOCYTES # BLD: 1.1 K/UL (ref 0.8–3.5)
LYMPHOCYTES NFR BLD: 16 % (ref 12–49)
MCH RBC QN AUTO: 31.8 PG (ref 26–34)
MCHC RBC AUTO-ENTMCNC: 34.2 G/DL (ref 30–36.5)
MCV RBC AUTO: 93 FL (ref 80–99)
MONOCYTES # BLD: 0.2 K/UL (ref 0–1)
MONOCYTES NFR BLD: 3 % (ref 5–13)
NEUTS SEG # BLD: 5.2 K/UL (ref 1.8–8)
NEUTS SEG NFR BLD: 80 % (ref 32–75)
NRBC # BLD: 0 K/UL (ref 0–0.01)
NRBC BLD-RTO: 0 PER 100 WBC
PLATELET # BLD AUTO: 298 K/UL (ref 150–400)
PMV BLD AUTO: 9.2 FL (ref 8.9–12.9)
POTASSIUM SERPL-SCNC: 3.6 MMOL/L (ref 3.5–5.1)
PROCALCITONIN SERPL-MCNC: 0.06 NG/ML
RBC # BLD AUTO: 3.58 M/UL (ref 4.1–5.7)
SODIUM SERPL-SCNC: 140 MMOL/L (ref 136–145)
WBC # BLD AUTO: 6.5 K/UL (ref 4.1–11.1)

## 2024-04-23 PROCEDURE — 6360000002 HC RX W HCPCS: Performed by: INTERNAL MEDICINE

## 2024-04-23 PROCEDURE — 2580000003 HC RX 258: Performed by: INTERNAL MEDICINE

## 2024-04-23 PROCEDURE — 87205 SMEAR GRAM STAIN: CPT

## 2024-04-23 PROCEDURE — 2700000000 HC OXYGEN THERAPY PER DAY

## 2024-04-23 PROCEDURE — 36415 COLL VENOUS BLD VENIPUNCTURE: CPT

## 2024-04-23 PROCEDURE — 80048 BASIC METABOLIC PNL TOTAL CA: CPT

## 2024-04-23 PROCEDURE — 94761 N-INVAS EAR/PLS OXIMETRY MLT: CPT

## 2024-04-23 PROCEDURE — 1100000000 HC RM PRIVATE

## 2024-04-23 PROCEDURE — 94640 AIRWAY INHALATION TREATMENT: CPT

## 2024-04-23 PROCEDURE — 6370000000 HC RX 637 (ALT 250 FOR IP): Performed by: INTERNAL MEDICINE

## 2024-04-23 PROCEDURE — 87070 CULTURE OTHR SPECIMN AEROBIC: CPT

## 2024-04-23 PROCEDURE — 85025 COMPLETE CBC W/AUTO DIFF WBC: CPT

## 2024-04-23 RX ORDER — NIFEDIPINE 90 MG/1
90 TABLET, EXTENDED RELEASE ORAL DAILY
COMMUNITY

## 2024-04-23 RX ORDER — LOSARTAN POTASSIUM 100 MG/1
100 TABLET ORAL DAILY
COMMUNITY

## 2024-04-23 RX ORDER — FERROUS GLUCONATE 324(38)MG
2 TABLET ORAL
COMMUNITY

## 2024-04-23 RX ORDER — ROSUVASTATIN CALCIUM 20 MG/1
20 TABLET, COATED ORAL DAILY
COMMUNITY

## 2024-04-23 RX ORDER — ALBUTEROL SULFATE 2.5 MG/3ML
2.5 SOLUTION RESPIRATORY (INHALATION) EVERY 4 HOURS PRN
Qty: 120 EACH | Refills: 0 | Status: SHIPPED | OUTPATIENT
Start: 2024-04-23

## 2024-04-23 RX ORDER — BUDESONIDE AND FORMOTEROL FUMARATE DIHYDRATE 160; 4.5 UG/1; UG/1
2 AEROSOL RESPIRATORY (INHALATION) 2 TIMES DAILY
COMMUNITY

## 2024-04-23 RX ORDER — ASPIRIN 81 MG/1
81 TABLET ORAL DAILY
COMMUNITY

## 2024-04-23 RX ORDER — PREDNISONE 20 MG/1
20 TABLET ORAL 2 TIMES DAILY WITH MEALS
Status: DISCONTINUED | OUTPATIENT
Start: 2024-04-23 | End: 2024-04-24 | Stop reason: HOSPADM

## 2024-04-23 RX ORDER — PREDNISONE 20 MG/1
TABLET ORAL
Qty: 15 TABLET | Refills: 0 | Status: SHIPPED | OUTPATIENT
Start: 2024-04-23 | End: 2024-05-03

## 2024-04-23 RX ORDER — OMEGA-3S/DHA/EPA/FISH OIL/D3 300MG-1000
400 CAPSULE ORAL DAILY
COMMUNITY

## 2024-04-23 RX ORDER — PANTOPRAZOLE SODIUM 40 MG/1
40 TABLET, DELAYED RELEASE ORAL DAILY
COMMUNITY

## 2024-04-23 RX ORDER — FAMOTIDINE, CALCIUM CARBONATE, MAGNESIUM HYDROXIDE 10; 800; 165 MG/1; MG/1; MG/1
2 TABLET, CHEWABLE ORAL PRN
Status: ON HOLD | COMMUNITY
End: 2024-04-23 | Stop reason: HOSPADM

## 2024-04-23 RX ORDER — HYDROCHLOROTHIAZIDE 25 MG/1
25 TABLET ORAL DAILY
COMMUNITY

## 2024-04-23 RX ADMIN — PANTOPRAZOLE SODIUM 40 MG: 40 TABLET, DELAYED RELEASE ORAL at 06:45

## 2024-04-23 RX ADMIN — PREDNISONE 20 MG: 20 TABLET ORAL at 17:13

## 2024-04-23 RX ADMIN — WATER 1000 MG: 1 INJECTION INTRAMUSCULAR; INTRAVENOUS; SUBCUTANEOUS at 12:44

## 2024-04-23 RX ADMIN — ARFORMOTEROL TARTRATE 15 MCG: 15 SOLUTION RESPIRATORY (INHALATION) at 19:42

## 2024-04-23 RX ADMIN — IPRATROPIUM BROMIDE AND ALBUTEROL SULFATE 1 DOSE: 2.5; .5 SOLUTION RESPIRATORY (INHALATION) at 19:43

## 2024-04-23 RX ADMIN — ENOXAPARIN SODIUM 40 MG: 100 INJECTION SUBCUTANEOUS at 08:45

## 2024-04-23 RX ADMIN — IPRATROPIUM BROMIDE AND ALBUTEROL SULFATE 1 DOSE: 2.5; .5 SOLUTION RESPIRATORY (INHALATION) at 07:09

## 2024-04-23 RX ADMIN — NIFEDIPINE 90 MG: 30 TABLET, EXTENDED RELEASE ORAL at 08:45

## 2024-04-23 RX ADMIN — ROSUVASTATIN CALCIUM 20 MG: 20 TABLET, FILM COATED ORAL at 20:19

## 2024-04-23 RX ADMIN — WATER 80 MG: 1 INJECTION INTRAMUSCULAR; INTRAVENOUS; SUBCUTANEOUS at 12:44

## 2024-04-23 RX ADMIN — IPRATROPIUM BROMIDE AND ALBUTEROL SULFATE 1 DOSE: 2.5; .5 SOLUTION RESPIRATORY (INHALATION) at 11:52

## 2024-04-23 RX ADMIN — IPRATROPIUM BROMIDE AND ALBUTEROL SULFATE 1 DOSE: 2.5; .5 SOLUTION RESPIRATORY (INHALATION) at 15:26

## 2024-04-23 RX ADMIN — SODIUM CHLORIDE, PRESERVATIVE FREE 10 ML: 5 INJECTION INTRAVENOUS at 08:46

## 2024-04-23 RX ADMIN — SODIUM CHLORIDE, PRESERVATIVE FREE 6 ML: 5 INJECTION INTRAVENOUS at 21:00

## 2024-04-23 RX ADMIN — BUDESONIDE INHALATION 500 MCG: 0.5 SUSPENSION RESPIRATORY (INHALATION) at 07:09

## 2024-04-23 RX ADMIN — ARFORMOTEROL TARTRATE 15 MCG: 15 SOLUTION RESPIRATORY (INHALATION) at 07:08

## 2024-04-23 RX ADMIN — WATER 80 MG: 1 INJECTION INTRAMUSCULAR; INTRAVENOUS; SUBCUTANEOUS at 06:45

## 2024-04-23 RX ADMIN — ASPIRIN 81 MG: 81 TABLET, COATED ORAL at 08:45

## 2024-04-23 RX ADMIN — LOSARTAN POTASSIUM 100 MG: 100 TABLET, FILM COATED ORAL at 08:44

## 2024-04-23 RX ADMIN — BUDESONIDE INHALATION 500 MCG: 0.5 SUSPENSION RESPIRATORY (INHALATION) at 19:43

## 2024-04-23 ASSESSMENT — PAIN SCALES - GENERAL: PAINLEVEL_OUTOF10: 0

## 2024-04-23 NOTE — PROGRESS NOTES
[facility]  Hospitalist Progress Note    NAME: Simeon An   :  1960   MRN:  [id number]     Total duration of encounter: 1 day      Interim Hospital Summary: 64 y.o. male who presented on 2024 with Respiratory failure (HCC). He has a past medical history of COPD (chronic obstructive pulmonary disease) (HCC) and Hypertension..       Pt admitted with exacerbation of chronic wheezing / dyspnea following exposure to VICKS vaporizor in home  Presented with Hypoxia and Respiratory Acidosis    Subjective:     Chief Complaint / Reason for Physician Visit  \"better\".  Discussed with RN   Cough with scant sputum, clear / white  No fever  No severe wheezing    Review of Systems:  Symptom Y/N Comments  Symptom Y/N Comments   Fever/Chills n   Chest Pain n    Poor Appetite n   Edema n    Cough y   Abdominal Pain n    Sputum y  scant  Joint Pain n    SOB/SAMPSON y   Pruritis/Rash n    Nausea/vomit n   Tolerating PT/OT y    Diarrhea n   Tolerating Diet y    Constipation n   Other         Current Facility-Administered Medications:     predniSONE (DELTASONE) tablet 20 mg, 20 mg, Oral, BID WC, Frankie Lawrence MD    sodium chloride flush 0.9 % injection 5-40 mL, 5-40 mL, IntraVENous, 2 times per day, Frankie Lawrence MD, 10 mL at 24 0846    sodium chloride flush 0.9 % injection 5-40 mL, 5-40 mL, IntraVENous, PRN, Frankie Lawrence MD    0.9 % sodium chloride infusion, , IntraVENous, PRN, Frankie Lawrence MD    enoxaparin (LOVENOX) injection 40 mg, 40 mg, SubCUTAneous, Daily, Frankie Lawrence MD, 40 mg at 24 0845    ondansetron (ZOFRAN-ODT) disintegrating tablet 4 mg, 4 mg, Oral, Q8H PRN **OR** ondansetron (ZOFRAN) injection 4 mg, 4 mg, IntraVENous, Q6H PRN, Frankie Lawrence MD    polyethylene glycol (GLYCOLAX) packet 17 g, 17 g, Oral, Daily PRN, Frankie Lawrence MD    acetaminophen (TYLENOL) tablet 650 mg, 650 mg, Oral, Q6H PRN **OR** acetaminophen (TYLENOL) suppository 650 mg, 650 mg, Rectal, Q6H PRN,

## 2024-04-23 NOTE — ED PROVIDER NOTES
EMERGENCY DEPARTMENT HISTORY AND PHYSICAL EXAM      Date: 4/22/2024  Patient Name: Simeon An    History of Presenting Illness     Chief Complaint   Patient presents with    Respiratory Distress       History Provided By: Patient    HPI: Simeon An, 64 y.o. male with PMHx as noted below presents the emergency department complaints of shortness of breath.  Patient with history of COPD had onset of wheezing and chest tightness earlier today, minimal relief with home albuterol also called EMS.  On EMS arrival patient was tachypneic so started on additional neb treatments    Pt denies any other alleviating or exacerbating factors. Additionally, pt specifically denies any recent fever, chills, headache, nausea, vomiting, abdominal pain, CP    PCP: Remy Li DO    Current Facility-Administered Medications   Medication Dose Route Frequency Provider Last Rate Last Admin    sodium chloride flush 0.9 % injection 5-40 mL  5-40 mL IntraVENous 2 times per day Frankie Lawrence MD        sodium chloride flush 0.9 % injection 5-40 mL  5-40 mL IntraVENous PRN Frankie Lawrence MD        0.9 % sodium chloride infusion   IntraVENous PRN Frankie Lawrence MD        enoxaparin (LOVENOX) injection 40 mg  40 mg SubCUTAneous Daily Frankie Lawrence MD        ondansetron (ZOFRAN-ODT) disintegrating tablet 4 mg  4 mg Oral Q8H PRN Frankie Lawrence MD        Or    ondansetron (ZOFRAN) injection 4 mg  4 mg IntraVENous Q6H PRN Frankie Lawrence MD        polyethylene glycol (GLYCOLAX) packet 17 g  17 g Oral Daily PRN Frankie Lawrence MD        acetaminophen (TYLENOL) tablet 650 mg  650 mg Oral Q6H PRN Frankie Lawrence MD        Or    acetaminophen (TYLENOL) suppository 650 mg  650 mg Rectal Q6H PRN Frankie Lawrence MD        0.9 % sodium chloride infusion   IntraVENous Continuous Frankie Lawrence MD        [START ON 4/23/2024] pantoprazole (PROTONIX) tablet 40 mg  40 mg Oral QAM AC Frankie Lawrence MD        arformoterol tartrate (BROVANA) nebulizer

## 2024-04-23 NOTE — PROGRESS NOTES
Pharmacy Medication Reconciliation     The patient was interviewed regarding current PTA medication list, use and drug allergies.    No one else was present in room.     Clarified PTA med list with patient.  Method of medication adherence:  pill bottles    The patient was questioned regarding use of any:  inhalers, topical products, over the counter medications, herbal medications, vitamin products or ophthalmic/nasal/otic medication use.   Allergies were verified.        Allergy Update: NKDA - no changes    Recommendations/Findings:   The following amendments were made to the patient's active medication list on file at [facility]:   1) Additions: Symbicort 160/4.5 ii puffs bid, vitamin D 400 IU daily,   Acid Reducer complete prn, revefenacin  15 mcg/3ml nebulizer prn ( recommended follow up with pulmonologist    2) Deletions: apixaban    3) Changes: ferrous gluconate taking both tablets in the morning    Counseling provided includes side effects/adverse drug reaction: y    Drug Interactions and duplicate therapies include: revefenacin (Yupelri) and spiriva    PTA medication list was corrected to the following:     Prior to Admission Medications   Prescriptions Last Dose Informant Patient Reported? Taking?   Famotidine-Ca Carb-Mag Hydrox (ACID REDUCER COMPLETE) -165 MG CHEW Past Month  Yes Yes   Sig: Take 2 tablets by mouth as needed (indigestion)   NIFEdipine (PROCARDIA XL) 90 MG extended release tablet 4/21/2024  Yes Yes   Sig: Take 1 tablet by mouth daily   albuterol (PROVENTIL) (2.5 MG/3ML) 0.083% nebulizer solution Past Month  Yes Yes   Sig: Use 3ML (2.5 MG total) in nebulizer every 6 hrs as needed for wheezing or shortness of breath.                aspirin 81 MG EC tablet 4/21/2024  Yes Yes   Sig: Take 1 tablet by mouth daily   budesonide-formoterol (SYMBICORT) 160-4.5 MCG/ACT AERO   Yes Yes   Sig: Inhale 2 puffs into the lungs 2 times daily   cholecalciferol (VITAMIN D3) 400 UNIT TABS tablet   Yes Yes   Sig:

## 2024-04-23 NOTE — PLAN OF CARE
Patient was on 2L nasal cannula and rested through out the night without difficulty or needing other interventions.  Patient did not have any significant events or physical injury this shift.  Continue to monitor respiratory status.

## 2024-04-23 NOTE — PLAN OF CARE
Problem: Respiratory - Adult  Goal: Achieves optimal ventilation and oxygenation  4/23/2024 0718 by Frankie Paredes Sr., RCP  Outcome: Progressing  4/23/2024 0622 by Monica Hdez RN  Outcome: Progressing

## 2024-04-23 NOTE — PROGRESS NOTES
04/23/24 0956   Resting (On O2)   SpO2 96      O2 Device Nasal cannula   O2 Flow Rate (l/min) 2 l/min   FIO2 (%) 28   During Walk (On O2)   SpO2 91      O2 Device Nasal cannula   O2 Flow Rate (l/min) 2 l/min   Walk/Assistance Device Ambulation   Rate of Dyspnea 2   Symptoms Shortness of breath;Fatigue   Comments Pt. c/o fatigue/ increased WOB   After Walk   SpO2 93      O2 Device Nasal cannula   FIO2 (%) 28   Rate of Dyspnea 1   Symptoms Fatigue   Does the Patient Need Portable Oxygen Tanks Yes

## 2024-04-24 VITALS
BODY MASS INDEX: 21.76 KG/M2 | DIASTOLIC BLOOD PRESSURE: 77 MMHG | HEIGHT: 69 IN | TEMPERATURE: 97.7 F | RESPIRATION RATE: 16 BRPM | OXYGEN SATURATION: 100 % | WEIGHT: 146.9 LBS | SYSTOLIC BLOOD PRESSURE: 121 MMHG | HEART RATE: 91 BPM

## 2024-04-24 LAB
EKG ATRIAL RATE: 125 BPM
EKG DIAGNOSIS: NORMAL
EKG P AXIS: 85 DEGREES
EKG P-R INTERVAL: 142 MS
EKG Q-T INTERVAL: 328 MS
EKG QRS DURATION: 86 MS
EKG QTC CALCULATION (BAZETT): 473 MS
EKG R AXIS: 78 DEGREES
EKG T AXIS: 69 DEGREES
EKG VENTRICULAR RATE: 125 BPM

## 2024-04-24 PROCEDURE — 6360000002 HC RX W HCPCS: Performed by: INTERNAL MEDICINE

## 2024-04-24 PROCEDURE — 94640 AIRWAY INHALATION TREATMENT: CPT

## 2024-04-24 PROCEDURE — 2580000003 HC RX 258: Performed by: INTERNAL MEDICINE

## 2024-04-24 PROCEDURE — 6370000000 HC RX 637 (ALT 250 FOR IP): Performed by: INTERNAL MEDICINE

## 2024-04-24 PROCEDURE — 2700000000 HC OXYGEN THERAPY PER DAY

## 2024-04-24 PROCEDURE — 94761 N-INVAS EAR/PLS OXIMETRY MLT: CPT

## 2024-04-24 RX ADMIN — NIFEDIPINE 90 MG: 30 TABLET, EXTENDED RELEASE ORAL at 09:37

## 2024-04-24 RX ADMIN — SODIUM CHLORIDE, PRESERVATIVE FREE 10 ML: 5 INJECTION INTRAVENOUS at 09:40

## 2024-04-24 RX ADMIN — WATER 1000 MG: 1 INJECTION INTRAMUSCULAR; INTRAVENOUS; SUBCUTANEOUS at 09:35

## 2024-04-24 RX ADMIN — LOSARTAN POTASSIUM 100 MG: 100 TABLET, FILM COATED ORAL at 09:38

## 2024-04-24 RX ADMIN — PANTOPRAZOLE SODIUM 40 MG: 40 TABLET, DELAYED RELEASE ORAL at 06:35

## 2024-04-24 RX ADMIN — BUDESONIDE INHALATION 500 MCG: 0.5 SUSPENSION RESPIRATORY (INHALATION) at 07:45

## 2024-04-24 RX ADMIN — ASPIRIN 81 MG: 81 TABLET, COATED ORAL at 09:38

## 2024-04-24 RX ADMIN — PREDNISONE 20 MG: 20 TABLET ORAL at 09:36

## 2024-04-24 RX ADMIN — ARFORMOTEROL TARTRATE 15 MCG: 15 SOLUTION RESPIRATORY (INHALATION) at 07:45

## 2024-04-24 RX ADMIN — IPRATROPIUM BROMIDE AND ALBUTEROL SULFATE 1 DOSE: 2.5; .5 SOLUTION RESPIRATORY (INHALATION) at 11:34

## 2024-04-24 RX ADMIN — ENOXAPARIN SODIUM 40 MG: 100 INJECTION SUBCUTANEOUS at 09:38

## 2024-04-24 RX ADMIN — IPRATROPIUM BROMIDE AND ALBUTEROL SULFATE 1 DOSE: 2.5; .5 SOLUTION RESPIRATORY (INHALATION) at 07:45

## 2024-04-24 ASSESSMENT — PAIN SCALES - GENERAL: PAINLEVEL_OUTOF10: 0

## 2024-04-24 NOTE — CARE COORDINATION
04/24/24 1443   Services At/After Discharge   Transition of Care Consult (CM Consult) N/A   Services At/After Discharge DME  (Home o2)    Resource Information Provided? No   Mode of Transport at Discharge Self   Confirm Follow Up Transport Self     Mr. An was discharged today. The plan was for him to go home with home o2 portable tanks. He left before o2 portable tanks were delivered.  Delivery was supposed to happen around 4pm. Per patient's primary nurse, patient was retested and found to not qualify for home o2. CM was NOT made aware of this before he left. Will leave order as it is since he did qualify this morning (see previous CM note). Mr. An stated he did NOT need home health or any other service. Told patient and girlfriend to contact CM for any questions or concerns even after discharge.          Transition of Care Plan:     RUR: Calculating   Prior Level of Functioning: Independent   Disposition:   If SNF or IPR: Date FOC offered:   Date FOC received:   Accepting facility:   Date authorization started with reference number:   Date authorization received and expires:   Follow up appointments: Porfirio Balderas MD 4/30 at 1pm.   DME needed: home o2 portable tanks   Transportation at discharge: POV gf   IM/IMM Medicare/ letter given: 1st IMM obtained 4/22   Is patient a  and connected with VA?               If yes, was  transfer form completed and VA notified?   Caregiver Contact:   Discharge Caregiver contacted prior to discharge?   Care Conference needed?   Barriers to discharge: None.         
Transition of Care Plan:     RUR: Calculating   Prior Level of Functioning: Independent   Disposition:   If SNF or IPR: Date FOC offered:   Date FOC received:   Accepting facility:   Date authorization started with reference number:   Date authorization received and expires:   Follow up appointments: Porfirio Balderas MD 4/30 at 1pm.   DME needed:   Transportation at discharge:   IM/IMM Medicare/[facility] letter given: 1st IMM obtained 4/22   Is patient a [place] and connected with VA?               If yes, was [place] transfer form completed and VA notified?   Caregiver Contact:   Discharge Caregiver contacted prior to discharge?   Care Conference needed?   Barriers to discharge: Medical instability/portable home o2 tanks     0925: Per patient's primary nurse, patient's o2 dropped to 87% on room air. Patient qualifies for home o2. Order placed for portable tanks through [employer]. Company: [employer]. Patient already has concentrator at home.     1020: Called Jeannine from [facility]. No answer. Voicemail full. Texted her to get back with me regarding patient's o2.     1107: Message sent to [facility] via [employer] portal inquiring about home o2. Waiting to hear back.     1152: Have not yet heard from anyone at [facility]. Called [facility] [phone]. Spoke with Loan. She said that set up will be this afternoon.  will be on his way around 4pm. Also Loan said she couldn't get in touch with patient. Told her would get patient to call her. Spoke with Mr. An and his girlfriend. Told them to contact [facility] so they can arrange o2 delivery. Girlfriend said she will be there to accept the home o2.   
Transition of Care Plan:    RUR: Calculating   Prior Level of Functioning: Independent   Disposition:   If SNF or IPR: Date FOC offered:   Date FOC received:   Accepting facility:   Date authorization started with reference number:   Date authorization received and expires:   Follow up appointments: Porfirio Balderas MD 4/30 at 1pm.   DME needed:   Transportation at discharge:   IM/IMM Medicare/ letter given: 1st IMM obtained 4/22   Is patient a [place] and connected with VA?    If yes, was [place] transfer form completed and VA notified?   Caregiver Contact:   Discharge Caregiver contacted prior to discharge?   Care Conference needed?   Barriers to discharge: Medical instability/portable home o2 tanks       1617: Made aware by [facility] supervisor that patient does NOT have portable tanks. Called Jeannine from [facility]. She reports that patient had portable o2 tanks taken away back in 2022. Now only has the concentrator. Spoke with patient who confirmed this. Spoke with Maggie from . Only walked him on o2. Told her need patient to be OFF o2. Plan for nurse to take patient off o2 and see if he drops. If he does will order home o2 portable tanks. If he doesn't drop will order walking oximetry test. Also asked patient if he needed CM to do UAI. They do NOT need UAI done at this time (girlfriend also present). Declines home health.     
states he has medicaid. Only medicare appearing on Facesheet. Mr. An has home o2 at home through [facility]. States he has tanks. He is interested in pursuing inogen. Referred him to [facility]. They have certain criteria for patients to qualify for Inogens. He stated understanding. Mr. An is currently in inpatient status. 1st IMM obtained 4/22 by [employer]. Mr. An told to let CM know if he would like a UAI completed; also provided him with CM info letter and told him to call if he has any questions or concerns during/after his dc.

## 2024-04-24 NOTE — DISCHARGE SUMMARY
lingers would advise holding ARB and ACE meds       Hyperlipidemia  Crestor 20mg nightly       GERD (gastroesophageal reflux disease)  Protonix 40mg daily  _______________________________________________________________________  Patient seen and examined by me on discharge day.  Pertinent Findings:  Vitals:    04/24/24 0745 04/24/24 0800 04/24/24 0930 04/24/24 1135   BP:  121/77     Pulse: 85 90  91   Resp:  16  16   Temp:  97.7 °F (36.5 °C)     TempSrc:  Oral     SpO2: 100%  91% 100%   Weight:       Height:          Gen:    Not in distress  Chest: Nonlabored respiration, reduced excursion, mild wheeze  CVS:   Regular rhythm.  No edema  Abd:  Soft, not distended, not tender  Neuro:  Alert, nonfocal    LABS:   04/22/24 15:54 04/22/24 15:55 04/22/24 16:34 04/22/24 17:56 04/23/24 05:40   Sodium  140   140   Potassium  5.7 (H)   3.6   Chloride  100   102   CARBON DIOXIDE  29   26   BUN,BUNPL  14   20   Creatinine  0.58 (L)   0.98   Bun/Cre Ratio  24 (H)   20   Anion Gap  11   12   Est, Glom Filt Rate  >90   85   Magnesium  1.7      Lactic Acid, Sepsis   0.9     Glucose, Random  126 (H)   168 (H)   Calcium, Total  9.0   8.8   ALBUMIN/GLOBULIN RATIO  1.1      Total Protein, Serum  7.8      Procalcitonin   0.06     POC O2 SAT 93   99 (H)    NT Pro-BNP  46      Troponin, High Sensitivity  68      Albumin  4.0      Globulin  3.8      Alk Phos  74      ALT  19      AST  46 (H)      Total Bilirubin  0.5         04/22/24 15:55 04/23/24 05:40   WBC 23.5 (H) 6.5   RBC 4.29 3.58 (L)   Hemoglobin Quant 13.5 11.4 (L)   Hematocrit 40.0 33.3 (L)   MCV 93.2 93.0   MCH 31.5 31.8   MCHC 33.8 34.2   MPV 10.0 9.2   RDW 13.5 13.5   Platelet Count 312 298   Neutrophils/100 leukocytes 75 80 (H)   Lymphocyte % 15 16   Monocytes % 7 3 (L)   Eosinophils % 2 0   Basophils % 0 0      04/22/24 16:00   Rapid Influenza A By PCR Not detected   Rapid Influenza B By PCR Not detected   SARS-CoV-2, PCR Not detected      04/22/24 15:54 04/22/24 17:56   pH,

## 2024-04-24 NOTE — PLAN OF CARE
Problem: Respiratory - Adult  Goal: Achieves optimal ventilation and oxygenation  4/24/2024 0749 by Melissa Dwyer RT  Outcome: Progressing  4/24/2024 0030 by Ayad Cedillo RN  Outcome: Progressing

## 2024-04-24 NOTE — PLAN OF CARE
Patient passed his walk testing with PT, thus able to be discharged.   1310 Patient was discharged with partner, all discharge paperwork was reviewed and patient stated a clear understanding of discharge POC. Patient was wheeled out in wheelchair by  nurse, with all of his personal belongings, and assisted into car to be taken home.

## 2024-04-26 LAB
BACTERIA SPEC CULT: NORMAL
GRAM STN SPEC: NORMAL
SERVICE CMNT-IMP: NORMAL

## 2024-04-27 LAB
BACTERIA SPEC CULT: NORMAL
BACTERIA SPEC CULT: NORMAL
SERVICE CMNT-IMP: NORMAL
SERVICE CMNT-IMP: NORMAL

## 2024-06-25 DIAGNOSIS — J44.89 CHRONIC BRONCHITIS WITH EMPHYSEMA (HCC): Primary | ICD-10-CM

## 2025-05-27 ENCOUNTER — APPOINTMENT (OUTPATIENT)
Facility: HOSPITAL | Age: 65
DRG: 190 | End: 2025-05-27
Payer: MEDICARE

## 2025-05-27 ENCOUNTER — HOSPITAL ENCOUNTER (INPATIENT)
Facility: HOSPITAL | Age: 65
LOS: 5 days | Discharge: HOME | DRG: 190 | End: 2025-06-01
Attending: EMERGENCY MEDICINE | Admitting: INTERNAL MEDICINE
Payer: MEDICARE

## 2025-05-27 DIAGNOSIS — J44.1 COPD EXACERBATION (HCC): Primary | ICD-10-CM

## 2025-05-27 DIAGNOSIS — J96.21 ACUTE ON CHRONIC HYPOXIC RESPIRATORY FAILURE (HCC): ICD-10-CM

## 2025-05-27 LAB
ALBUMIN SERPL-MCNC: 4.1 G/DL (ref 3.5–5)
ALBUMIN/GLOB SERPL: 1 (ref 1.1–2.2)
ALP SERPL-CCNC: 61 U/L (ref 45–117)
ALT SERPL-CCNC: 20 U/L (ref 12–78)
ANION GAP SERPL CALC-SCNC: 6 MMOL/L (ref 2–12)
AST SERPL-CCNC: 42 U/L (ref 15–37)
BASOPHILS # BLD: 0.05 K/UL (ref 0–0.1)
BASOPHILS NFR BLD: 0.6 % (ref 0–1)
BILIRUB SERPL-MCNC: 0.4 MG/DL (ref 0.2–1)
BUN SERPL-MCNC: 19 MG/DL (ref 6–20)
BUN/CREAT SERPL: 20 (ref 12–20)
CALCIUM SERPL-MCNC: 9.6 MG/DL (ref 8.5–10.1)
CHLORIDE SERPL-SCNC: 100 MMOL/L (ref 97–108)
CO2 SERPL-SCNC: 35 MMOL/L (ref 21–32)
CREAT SERPL-MCNC: 0.95 MG/DL (ref 0.7–1.3)
DIFFERENTIAL METHOD BLD: ABNORMAL
EOSINOPHIL # BLD: 0.61 K/UL (ref 0–0.4)
EOSINOPHIL NFR BLD: 7.7 % (ref 0–7)
ERYTHROCYTE [DISTWIDTH] IN BLOOD BY AUTOMATED COUNT: 15.8 % (ref 11.5–14.5)
GLOBULIN SER CALC-MCNC: 4.2 G/DL (ref 2–4)
GLUCOSE SERPL-MCNC: 94 MG/DL (ref 65–100)
HCT VFR BLD AUTO: 39.1 % (ref 36.6–50.3)
HGB BLD-MCNC: 12.5 G/DL (ref 12.1–17)
IMM GRANULOCYTES # BLD AUTO: 0.02 K/UL (ref 0–0.04)
IMM GRANULOCYTES NFR BLD AUTO: 0.3 % (ref 0–0.5)
LYMPHOCYTES # BLD: 1.62 K/UL (ref 0.8–3.5)
LYMPHOCYTES NFR BLD: 20.5 % (ref 12–49)
MAGNESIUM SERPL-MCNC: 1.8 MG/DL (ref 1.6–2.4)
MCH RBC QN AUTO: 29.6 PG (ref 26–34)
MCHC RBC AUTO-ENTMCNC: 32 G/DL (ref 30–36.5)
MCV RBC AUTO: 92.7 FL (ref 80–99)
MONOCYTES # BLD: 0.66 K/UL (ref 0–1)
MONOCYTES NFR BLD: 8.3 % (ref 5–13)
NEUTS SEG # BLD: 4.96 K/UL (ref 1.8–8)
NEUTS SEG NFR BLD: 62.6 % (ref 32–75)
NRBC # BLD: 0 K/UL (ref 0–0.01)
NRBC BLD-RTO: 0 PER 100 WBC
NT PRO BNP: 133 PG/ML (ref 0–125)
PLATELET # BLD AUTO: 314 K/UL (ref 150–400)
PMV BLD AUTO: 10.3 FL (ref 8.9–12.9)
POTASSIUM SERPL-SCNC: 5 MMOL/L (ref 3.5–5.1)
PROT SERPL-MCNC: 8.3 G/DL (ref 6.4–8.2)
RBC # BLD AUTO: 4.22 M/UL (ref 4.1–5.7)
SODIUM SERPL-SCNC: 141 MMOL/L (ref 136–145)
TROPONIN I SERPL HS-MCNC: 23 NG/L (ref 0–76)
WBC # BLD AUTO: 7.9 K/UL (ref 4.1–11.1)

## 2025-05-27 PROCEDURE — 6370000000 HC RX 637 (ALT 250 FOR IP): Performed by: EMERGENCY MEDICINE

## 2025-05-27 PROCEDURE — 94640 AIRWAY INHALATION TREATMENT: CPT

## 2025-05-27 PROCEDURE — 71045 X-RAY EXAM CHEST 1 VIEW: CPT

## 2025-05-27 PROCEDURE — 99285 EMERGENCY DEPT VISIT HI MDM: CPT

## 2025-05-27 PROCEDURE — 80053 COMPREHEN METABOLIC PANEL: CPT

## 2025-05-27 PROCEDURE — 84484 ASSAY OF TROPONIN QUANT: CPT

## 2025-05-27 PROCEDURE — 6370000000 HC RX 637 (ALT 250 FOR IP): Performed by: INTERNAL MEDICINE

## 2025-05-27 PROCEDURE — 1100000003 HC PRIVATE W/ TELEMETRY

## 2025-05-27 PROCEDURE — 83735 ASSAY OF MAGNESIUM: CPT

## 2025-05-27 PROCEDURE — 36415 COLL VENOUS BLD VENIPUNCTURE: CPT

## 2025-05-27 PROCEDURE — 6360000002 HC RX W HCPCS: Performed by: INTERNAL MEDICINE

## 2025-05-27 PROCEDURE — 85025 COMPLETE CBC W/AUTO DIFF WBC: CPT

## 2025-05-27 PROCEDURE — 2500000003 HC RX 250 WO HCPCS: Performed by: EMERGENCY MEDICINE

## 2025-05-27 PROCEDURE — 0202U NFCT DS 22 TRGT SARS-COV-2: CPT

## 2025-05-27 PROCEDURE — 6360000002 HC RX W HCPCS: Performed by: EMERGENCY MEDICINE

## 2025-05-27 PROCEDURE — 2500000003 HC RX 250 WO HCPCS: Performed by: INTERNAL MEDICINE

## 2025-05-27 PROCEDURE — 96374 THER/PROPH/DIAG INJ IV PUSH: CPT

## 2025-05-27 PROCEDURE — 83880 ASSAY OF NATRIURETIC PEPTIDE: CPT

## 2025-05-27 RX ORDER — SODIUM CHLORIDE 9 MG/ML
INJECTION, SOLUTION INTRAVENOUS PRN
Status: DISCONTINUED | OUTPATIENT
Start: 2025-05-27 | End: 2025-06-01 | Stop reason: HOSPADM

## 2025-05-27 RX ORDER — HYDROCHLOROTHIAZIDE 25 MG/1
25 TABLET ORAL DAILY
Status: DISCONTINUED | OUTPATIENT
Start: 2025-05-27 | End: 2025-06-01 | Stop reason: HOSPADM

## 2025-05-27 RX ORDER — NIFEDIPINE 30 MG/1
90 TABLET, EXTENDED RELEASE ORAL DAILY
Status: DISCONTINUED | OUTPATIENT
Start: 2025-05-27 | End: 2025-06-01 | Stop reason: HOSPADM

## 2025-05-27 RX ORDER — PANTOPRAZOLE SODIUM 40 MG/1
40 TABLET, DELAYED RELEASE ORAL DAILY
Status: DISCONTINUED | OUTPATIENT
Start: 2025-05-27 | End: 2025-06-01 | Stop reason: HOSPADM

## 2025-05-27 RX ORDER — SODIUM CHLORIDE 0.9 % (FLUSH) 0.9 %
5-40 SYRINGE (ML) INJECTION EVERY 12 HOURS SCHEDULED
Status: DISCONTINUED | OUTPATIENT
Start: 2025-05-27 | End: 2025-06-01 | Stop reason: HOSPADM

## 2025-05-27 RX ORDER — POLYETHYLENE GLYCOL 3350 17 G/17G
17 POWDER, FOR SOLUTION ORAL DAILY PRN
Status: DISCONTINUED | OUTPATIENT
Start: 2025-05-27 | End: 2025-06-01 | Stop reason: HOSPADM

## 2025-05-27 RX ORDER — BENZONATATE 100 MG/1
100 CAPSULE ORAL 3 TIMES DAILY PRN
Status: DISCONTINUED | OUTPATIENT
Start: 2025-05-27 | End: 2025-06-01 | Stop reason: HOSPADM

## 2025-05-27 RX ORDER — ARFORMOTEROL TARTRATE 15 UG/2ML
15 SOLUTION RESPIRATORY (INHALATION)
Status: DISCONTINUED | OUTPATIENT
Start: 2025-05-27 | End: 2025-06-01 | Stop reason: HOSPADM

## 2025-05-27 RX ORDER — ONDANSETRON 4 MG/1
4 TABLET, ORALLY DISINTEGRATING ORAL EVERY 8 HOURS PRN
Status: DISCONTINUED | OUTPATIENT
Start: 2025-05-27 | End: 2025-06-01 | Stop reason: HOSPADM

## 2025-05-27 RX ORDER — ROSUVASTATIN CALCIUM 20 MG/1
20 TABLET, COATED ORAL DAILY
Status: DISCONTINUED | OUTPATIENT
Start: 2025-05-27 | End: 2025-06-01 | Stop reason: HOSPADM

## 2025-05-27 RX ORDER — TAMSULOSIN HYDROCHLORIDE 0.4 MG/1
0.4 CAPSULE ORAL DAILY
COMMUNITY

## 2025-05-27 RX ORDER — ONDANSETRON 2 MG/ML
4 INJECTION INTRAMUSCULAR; INTRAVENOUS EVERY 6 HOURS PRN
Status: DISCONTINUED | OUTPATIENT
Start: 2025-05-27 | End: 2025-06-01 | Stop reason: HOSPADM

## 2025-05-27 RX ORDER — BUDESONIDE, GLYCOPYRROLATE, AND FORMOTEROL FUMARATE 160; 9; 4.8 UG/1; UG/1; UG/1
2 AEROSOL, METERED RESPIRATORY (INHALATION) 2 TIMES DAILY
Status: ON HOLD | COMMUNITY
End: 2025-06-01

## 2025-05-27 RX ORDER — SODIUM CHLORIDE 0.9 % (FLUSH) 0.9 %
5-40 SYRINGE (ML) INJECTION PRN
Status: DISCONTINUED | OUTPATIENT
Start: 2025-05-27 | End: 2025-06-01 | Stop reason: HOSPADM

## 2025-05-27 RX ORDER — IPRATROPIUM BROMIDE AND ALBUTEROL SULFATE 2.5; .5 MG/3ML; MG/3ML
1 SOLUTION RESPIRATORY (INHALATION)
Status: DISCONTINUED | OUTPATIENT
Start: 2025-05-27 | End: 2025-05-29

## 2025-05-27 RX ORDER — BENZONATATE 100 MG/1
100 CAPSULE ORAL 3 TIMES DAILY PRN
Status: ON HOLD | COMMUNITY
End: 2025-06-01

## 2025-05-27 RX ORDER — AZITHROMYCIN 250 MG/1
500 TABLET, FILM COATED ORAL DAILY
Status: DISCONTINUED | OUTPATIENT
Start: 2025-05-27 | End: 2025-05-28

## 2025-05-27 RX ORDER — CLONIDINE HYDROCHLORIDE 0.2 MG/1
0.2 TABLET ORAL EVERY 8 HOURS PRN
Status: DISCONTINUED | OUTPATIENT
Start: 2025-05-27 | End: 2025-06-01 | Stop reason: HOSPADM

## 2025-05-27 RX ORDER — LOSARTAN POTASSIUM 100 MG/1
100 TABLET ORAL DAILY
Status: DISCONTINUED | OUTPATIENT
Start: 2025-05-27 | End: 2025-06-01 | Stop reason: HOSPADM

## 2025-05-27 RX ORDER — IPRATROPIUM BROMIDE AND ALBUTEROL SULFATE 2.5; .5 MG/3ML; MG/3ML
1 SOLUTION RESPIRATORY (INHALATION) EVERY 20 MIN
Status: COMPLETED | OUTPATIENT
Start: 2025-05-27 | End: 2025-05-27

## 2025-05-27 RX ORDER — TAMSULOSIN HYDROCHLORIDE 0.4 MG/1
0.4 CAPSULE ORAL DAILY
Status: DISCONTINUED | OUTPATIENT
Start: 2025-05-27 | End: 2025-06-01 | Stop reason: HOSPADM

## 2025-05-27 RX ORDER — ACETAMINOPHEN 650 MG/1
650 SUPPOSITORY RECTAL EVERY 6 HOURS PRN
Status: DISCONTINUED | OUTPATIENT
Start: 2025-05-27 | End: 2025-06-01 | Stop reason: HOSPADM

## 2025-05-27 RX ORDER — ENOXAPARIN SODIUM 100 MG/ML
40 INJECTION SUBCUTANEOUS DAILY
Status: DISCONTINUED | OUTPATIENT
Start: 2025-05-27 | End: 2025-06-01 | Stop reason: HOSPADM

## 2025-05-27 RX ORDER — BUDESONIDE 0.5 MG/2ML
0.5 INHALANT ORAL
Status: DISCONTINUED | OUTPATIENT
Start: 2025-05-27 | End: 2025-06-01 | Stop reason: HOSPADM

## 2025-05-27 RX ORDER — GUAIFENESIN 600 MG/1
600 TABLET, EXTENDED RELEASE ORAL 2 TIMES DAILY
Status: DISCONTINUED | OUTPATIENT
Start: 2025-05-27 | End: 2025-06-01 | Stop reason: HOSPADM

## 2025-05-27 RX ORDER — IPRATROPIUM BROMIDE AND ALBUTEROL SULFATE 2.5; .5 MG/3ML; MG/3ML
1 SOLUTION RESPIRATORY (INHALATION)
Status: COMPLETED | OUTPATIENT
Start: 2025-05-27 | End: 2025-05-27

## 2025-05-27 RX ORDER — ACETAMINOPHEN 325 MG/1
650 TABLET ORAL EVERY 6 HOURS PRN
Status: DISCONTINUED | OUTPATIENT
Start: 2025-05-27 | End: 2025-06-01 | Stop reason: HOSPADM

## 2025-05-27 RX ADMIN — SODIUM CHLORIDE, PRESERVATIVE FREE 10 ML: 5 INJECTION INTRAVENOUS at 21:20

## 2025-05-27 RX ADMIN — IPRATROPIUM BROMIDE AND ALBUTEROL SULFATE 1 DOSE: .5; 2.5 SOLUTION RESPIRATORY (INHALATION) at 15:59

## 2025-05-27 RX ADMIN — BUDESONIDE 500 MCG: 0.5 SUSPENSION RESPIRATORY (INHALATION) at 19:20

## 2025-05-27 RX ADMIN — ARFORMOTEROL TARTRATE 15 MCG: 15 SOLUTION RESPIRATORY (INHALATION) at 19:20

## 2025-05-27 RX ADMIN — WATER 60 MG: 1 INJECTION INTRAMUSCULAR; INTRAVENOUS; SUBCUTANEOUS at 21:19

## 2025-05-27 RX ADMIN — IPRATROPIUM BROMIDE 0.5 MG: 0.5 SOLUTION RESPIRATORY (INHALATION) at 19:20

## 2025-05-27 RX ADMIN — IPRATROPIUM BROMIDE AND ALBUTEROL SULFATE 1 DOSE: .5; 2.5 SOLUTION RESPIRATORY (INHALATION) at 19:20

## 2025-05-27 RX ADMIN — IPRATROPIUM BROMIDE AND ALBUTEROL SULFATE 1 DOSE: .5; 2.5 SOLUTION RESPIRATORY (INHALATION) at 16:03

## 2025-05-27 RX ADMIN — IPRATROPIUM BROMIDE AND ALBUTEROL SULFATE 1 DOSE: .5; 2.5 SOLUTION RESPIRATORY (INHALATION) at 17:49

## 2025-05-27 RX ADMIN — METHYLPREDNISOLONE SODIUM SUCCINATE 125 MG: 125 INJECTION INTRAMUSCULAR; INTRAVENOUS at 16:06

## 2025-05-27 RX ADMIN — IPRATROPIUM BROMIDE AND ALBUTEROL SULFATE 1 DOSE: .5; 2.5 SOLUTION RESPIRATORY (INHALATION) at 23:00

## 2025-05-27 RX ADMIN — GUAIFENESIN 600 MG: 600 TABLET ORAL at 21:18

## 2025-05-27 ASSESSMENT — PAIN - FUNCTIONAL ASSESSMENT: PAIN_FUNCTIONAL_ASSESSMENT: NONE - DENIES PAIN

## 2025-05-27 NOTE — ED PROVIDER NOTES
IMPRESSION     1. COPD exacerbation (HCC)    2. Acute on chronic hypoxic respiratory failure (HCC)          DISPOSITION/PLAN   Simeon An's  results have been reviewed with him.  He has been counseled regarding his diagnosis, treatment, and plan.  He verbally conveys understanding and agreement of the signs, symptoms, diagnosis, treatment and prognosis and additionally agrees to follow up as discussed.  He also agrees with the care-plan and conveys that all of his questions have been answered.  I have also provided discharge instructions for him that include: educational information regarding their diagnosis and treatment, and list of reasons why they would want to return to the ED prior to their follow-up appointment, should his condition change.     CLINICAL IMPRESSION    Admit Note: Pt is being admitted by Dr. Pappas. The results of their tests and reason(s) for their admission have been discussed with pt and/or available family. They convey agreement and understanding for the need to be admitted and for the admission diagnosis.     PATIENT REFERRED TO:  No follow-up provider specified.     DISCHARGE MEDICATIONS:     Medication List        ASK your doctor about these medications      albuterol (2.5 MG/3ML) 0.083% nebulizer solution  Commonly known as: PROVENTIL  Take 3 mLs by nebulization every 4 hours as needed for Wheezing or Shortness of Breath     benzonatate 100 MG capsule  Commonly known as: TESSALON     Breztri Aerosphere 160-9-4.8 MCG/ACT Aero  Generic drug: Budeson-Glycopyrrol-Formoterol     ferrous gluconate 324 (38 Fe) MG tablet  Commonly known as: FERGON     hydroCHLOROthiazide 25 MG tablet  Commonly known as: HYDRODIURIL     losartan 100 MG tablet  Commonly known as: COZAAR     NIFEdipine 90 MG extended release tablet  Commonly known as: PROCARDIA XL     pantoprazole 40 MG tablet  Commonly known as: PROTONIX     rosuvastatin 20 MG tablet  Commonly known as: CRESTOR     tamsulosin 0.4 MG

## 2025-05-27 NOTE — ED NOTES
Admission SBAR Note  Situation/Background: Patient presented to ED for respiratory distress, increasing x 2 days. Patient has been taking home nebs and rescue inhalers with no relief. Patient is alert and oriented x 4. On 2 lpm via nasal cannula at home at baseline. Is able to speak in full sentences.    Patient is being transferred to m/s ([facility]), Room# 114    Patient's Chief Complaint was shortness of breath and is admitted for COPD  .    CODE STATUS: Full  CSSRS: 0 - No Risk    ISOLATION/PRECAUTIONS: No  ISOLATION TYPE: n/a    Is this a behavioral health patient? No  Has wanding been completed No  Are belongings secure? No    Called outstanding consults: Yes    STAT labs collected: No    Repeat Lactic Acid DUE? No  TIME DUE: n/a    All STAT orders are complete: Yes    The following personal items will be sent with the patient during transfer to the floor:     All valuables: none       ASSESSMENT:    NEURO:   NIH SCORE: 0,1-4,5-15,15-20,21-42: 0   JOSUÉ SWALLOW SCREEN COMPLETE: No  ORIENTATION LEVEL: ORIENTATION LEVEL: Person, Place, Time, and Situation  Cognition: following commands  follows multi-step complex commands/direction  Speech: shows no evidence of impairment    Is patient impulsive? No  Is patient oriented? Yes  Do they follow commands? Yes  Is the patient ambulatory? Yes    FALL RISK? Yes  Interventions: Implemented/recommended use of non-skid footwear, Implemented/recommended use of fall risk identification flag to all team members, Implemented/recommended assistive devices and encouraged their use, and Implemented/recommended resources for alarm system (personal alarm, bed alarm, call bell, etc.)     RESPIRATORY:   Is patient on oxygen? Yes  Oxygen therapy: Nasal cannula  O2 rate: 2    CARDIAC:   Is cardiac monitoring ordered? Yes    Last Rhythm: Rhythm including paccardio: Sinus Tachycardia 126  Patient to transfer with tele box

## 2025-05-27 NOTE — ED TRIAGE NOTES
Increasing SOB and productive cough x 1 week ,audible grunting, wet , non productive cough and 2 word sentences, baseline 2 lpm/nc at home and has recently been requiring 3

## 2025-05-27 NOTE — H&P
Hospitalist Admission Note    NAME:   Simeon An   : 1960   MRN: [id number]     Date/Time: 2025 5:43 PM    Patient PCP: Suzanne Corey MD    ______________________________________________________________________  Given the patient's current clinical presentation, I have a high level of concern for decompensation if discharged from the emergency department.  Complex decision making was performed, which includes reviewing the patient's available past medical records, laboratory results, and x-ray films.       My assessment of this patient's clinical condition and my plan of care is as follows.    Assessment / Plan:    Acute respiratory distress with tachycardia and tachypnea POA  Chronic respiratory failure on 2L O2  Due to COPD exacerbation due to acute bronchitis  -admit  -Scheduled IV solumedrol, Scheduled Duonebs  -Mucolytic, antitussives  -Zithromax  -Substiture prophylactic inhalers to formulary nebs  -Continue O2 support  -Check Respiratory Panel    Hypertension  -BP elevated likely due to respiratory distress  -Continue Procardia, Losartan and HCTZ  -PRN Clonidine    BPH  -Continue Tamsulosin    HLD  -Continue Crestor    GERD  -Continue PPIs    Iron Deficiency  -I am not able to find ferritin and Iron profile in records  -resume iron suppliments upon discharge    Chronic Smoker  -Claims to have quit few weeks ago    Medical Decision Making:   I personally reviewed labs: CBC, CMP  I personally reviewed imaging:CXR  I personally reviewed EKG:  Toxic drug monitoring: Abx and Steroids  Discussed case with: ED provider. After discussion I am in agreement that acuity of patient's medical condition necessitates hospital stay.      Code Status: Full, Friend Sharron De Paz and Daughter Ernestine Mancia are POA   DVT Prophylaxis: Lovenox  Baseline: Functional, on 2L O2    Subjective:   CHIEF COMPLAINT: Shortness of breath    HISTORY OF PRESENT ILLNESS:     Simeon An is a 65 y.o.  male with

## 2025-05-28 LAB
ALBUMIN SERPL-MCNC: 3.5 G/DL (ref 3.5–5)
ALBUMIN/GLOB SERPL: 1 (ref 1.1–2.2)
ALP SERPL-CCNC: 59 U/L (ref 45–117)
ALT SERPL-CCNC: 16 U/L (ref 12–78)
ANION GAP SERPL CALC-SCNC: 8 MMOL/L (ref 2–12)
AST SERPL-CCNC: 14 U/L (ref 15–37)
B PERT DNA SPEC QL NAA+PROBE: NOT DETECTED
BILIRUB SERPL-MCNC: 0.1 MG/DL (ref 0.2–1)
BORDETELLA PARAPERTUSSIS BY PCR: NOT DETECTED
BUN SERPL-MCNC: 22 MG/DL (ref 6–20)
BUN/CREAT SERPL: 22 (ref 12–20)
C PNEUM DNA SPEC QL NAA+PROBE: NOT DETECTED
CALCIUM SERPL-MCNC: 9.4 MG/DL (ref 8.5–10.1)
CHLORIDE SERPL-SCNC: 100 MMOL/L (ref 97–108)
CO2 SERPL-SCNC: 32 MMOL/L (ref 21–32)
CREAT SERPL-MCNC: 0.99 MG/DL (ref 0.7–1.3)
FLUAV SUBTYP SPEC NAA+PROBE: NOT DETECTED
FLUBV RNA SPEC QL NAA+PROBE: NOT DETECTED
GLOBULIN SER CALC-MCNC: 3.5 G/DL (ref 2–4)
GLUCOSE SERPL-MCNC: 212 MG/DL (ref 65–100)
HADV DNA SPEC QL NAA+PROBE: NOT DETECTED
HCOV 229E RNA SPEC QL NAA+PROBE: NOT DETECTED
HCOV HKU1 RNA SPEC QL NAA+PROBE: DETECTED
HCOV NL63 RNA SPEC QL NAA+PROBE: NOT DETECTED
HCOV OC43 RNA SPEC QL NAA+PROBE: NOT DETECTED
HMPV RNA SPEC QL NAA+PROBE: NOT DETECTED
HPIV1 RNA SPEC QL NAA+PROBE: NOT DETECTED
HPIV2 RNA SPEC QL NAA+PROBE: NOT DETECTED
HPIV3 RNA SPEC QL NAA+PROBE: NOT DETECTED
HPIV4 RNA SPEC QL NAA+PROBE: NOT DETECTED
M PNEUMO DNA SPEC QL NAA+PROBE: NOT DETECTED
MAGNESIUM SERPL-MCNC: 1.8 MG/DL (ref 1.6–2.4)
POTASSIUM SERPL-SCNC: 3.4 MMOL/L (ref 3.5–5.1)
PROT SERPL-MCNC: 7 G/DL (ref 6.4–8.2)
RSV RNA SPEC QL NAA+PROBE: NOT DETECTED
RV+EV RNA SPEC QL NAA+PROBE: NOT DETECTED
SARS-COV-2 RNA RESP QL NAA+PROBE: NOT DETECTED
SODIUM SERPL-SCNC: 140 MMOL/L (ref 136–145)

## 2025-05-28 PROCEDURE — 94760 N-INVAS EAR/PLS OXIMETRY 1: CPT

## 2025-05-28 PROCEDURE — 6360000002 HC RX W HCPCS: Performed by: INTERNAL MEDICINE

## 2025-05-28 PROCEDURE — 2700000000 HC OXYGEN THERAPY PER DAY

## 2025-05-28 PROCEDURE — 94761 N-INVAS EAR/PLS OXIMETRY MLT: CPT

## 2025-05-28 PROCEDURE — 2500000003 HC RX 250 WO HCPCS: Performed by: INTERNAL MEDICINE

## 2025-05-28 PROCEDURE — 1100000003 HC PRIVATE W/ TELEMETRY

## 2025-05-28 PROCEDURE — 83735 ASSAY OF MAGNESIUM: CPT

## 2025-05-28 PROCEDURE — 6370000000 HC RX 637 (ALT 250 FOR IP): Performed by: INTERNAL MEDICINE

## 2025-05-28 PROCEDURE — 94640 AIRWAY INHALATION TREATMENT: CPT

## 2025-05-28 PROCEDURE — 36415 COLL VENOUS BLD VENIPUNCTURE: CPT

## 2025-05-28 PROCEDURE — 80053 COMPREHEN METABOLIC PANEL: CPT

## 2025-05-28 RX ORDER — AZITHROMYCIN 250 MG/1
500 TABLET, FILM COATED ORAL DAILY
Status: COMPLETED | OUTPATIENT
Start: 2025-05-28 | End: 2025-06-01

## 2025-05-28 RX ADMIN — WATER 60 MG: 1 INJECTION INTRAMUSCULAR; INTRAVENOUS; SUBCUTANEOUS at 04:28

## 2025-05-28 RX ADMIN — ENOXAPARIN SODIUM 40 MG: 100 INJECTION SUBCUTANEOUS at 08:50

## 2025-05-28 RX ADMIN — GUAIFENESIN 600 MG: 600 TABLET ORAL at 21:52

## 2025-05-28 RX ADMIN — IPRATROPIUM BROMIDE 0.5 MG: 0.5 SOLUTION RESPIRATORY (INHALATION) at 19:19

## 2025-05-28 RX ADMIN — IPRATROPIUM BROMIDE AND ALBUTEROL SULFATE 1 DOSE: .5; 2.5 SOLUTION RESPIRATORY (INHALATION) at 04:32

## 2025-05-28 RX ADMIN — SODIUM CHLORIDE, PRESERVATIVE FREE 10 ML: 5 INJECTION INTRAVENOUS at 22:03

## 2025-05-28 RX ADMIN — IPRATROPIUM BROMIDE AND ALBUTEROL SULFATE 1 DOSE: .5; 2.5 SOLUTION RESPIRATORY (INHALATION) at 07:30

## 2025-05-28 RX ADMIN — HYDROCHLOROTHIAZIDE 25 MG: 25 TABLET ORAL at 08:48

## 2025-05-28 RX ADMIN — IPRATROPIUM BROMIDE AND ALBUTEROL SULFATE 1 DOSE: .5; 2.5 SOLUTION RESPIRATORY (INHALATION) at 15:01

## 2025-05-28 RX ADMIN — WATER 60 MG: 1 INJECTION INTRAMUSCULAR; INTRAVENOUS; SUBCUTANEOUS at 08:48

## 2025-05-28 RX ADMIN — METHYLPREDNISOLONE SODIUM SUCCINATE 60 MG: 125 INJECTION INTRAMUSCULAR; INTRAVENOUS at 17:06

## 2025-05-28 RX ADMIN — NIFEDIPINE 90 MG: 30 TABLET, FILM COATED, EXTENDED RELEASE ORAL at 08:49

## 2025-05-28 RX ADMIN — IPRATROPIUM BROMIDE AND ALBUTEROL SULFATE 1 DOSE: .5; 2.5 SOLUTION RESPIRATORY (INHALATION) at 11:38

## 2025-05-28 RX ADMIN — AZITHROMYCIN DIHYDRATE 500 MG: 250 TABLET ORAL at 08:49

## 2025-05-28 RX ADMIN — LOSARTAN POTASSIUM 100 MG: 100 TABLET, FILM COATED ORAL at 08:50

## 2025-05-28 RX ADMIN — BUDESONIDE 500 MCG: 0.5 SUSPENSION RESPIRATORY (INHALATION) at 19:19

## 2025-05-28 RX ADMIN — TAMSULOSIN HYDROCHLORIDE 0.4 MG: 0.4 CAPSULE ORAL at 08:49

## 2025-05-28 RX ADMIN — IPRATROPIUM BROMIDE AND ALBUTEROL SULFATE 1 DOSE: .5; 2.5 SOLUTION RESPIRATORY (INHALATION) at 19:19

## 2025-05-28 RX ADMIN — ARFORMOTEROL TARTRATE 15 MCG: 15 SOLUTION RESPIRATORY (INHALATION) at 07:30

## 2025-05-28 RX ADMIN — GUAIFENESIN 600 MG: 600 TABLET ORAL at 08:50

## 2025-05-28 RX ADMIN — SODIUM CHLORIDE, PRESERVATIVE FREE 10 ML: 5 INJECTION INTRAVENOUS at 08:50

## 2025-05-28 RX ADMIN — ARFORMOTEROL TARTRATE 15 MCG: 15 SOLUTION RESPIRATORY (INHALATION) at 19:19

## 2025-05-28 RX ADMIN — IPRATROPIUM BROMIDE 0.5 MG: 0.5 SOLUTION RESPIRATORY (INHALATION) at 07:30

## 2025-05-28 RX ADMIN — PANTOPRAZOLE SODIUM 40 MG: 40 TABLET, DELAYED RELEASE ORAL at 08:49

## 2025-05-28 RX ADMIN — ROSUVASTATIN 20 MG: 20 TABLET, FILM COATED ORAL at 08:49

## 2025-05-28 RX ADMIN — IPRATROPIUM BROMIDE 0.5 MG: 0.5 SOLUTION RESPIRATORY (INHALATION) at 15:01

## 2025-05-28 RX ADMIN — BUDESONIDE 500 MCG: 0.5 SUSPENSION RESPIRATORY (INHALATION) at 07:30

## 2025-05-28 NOTE — PLAN OF CARE
Problem: Respiratory - Adult  Goal: Achieves optimal ventilation and oxygenation  5/28/2025 0753 by Melissa Dwyer RT  Outcome: Progressing  5/27/2025 2006 by Aleyda Llanos RT  Flowsheets (Taken 5/27/2025 2006)  Achieves optimal ventilation and oxygenation:   Assess for changes in respiratory status   Position to facilitate oxygenation and minimize respiratory effort   Respiratory therapy support as indicated   Assess the need for suctioning and aspirate as needed   Assess for changes in mentation and behavior   Oxygen supplementation based on oxygen saturation or arterial blood gases   Encourage broncho-pulmonary hygiene including cough, deep breathe, incentive spirometry   Assess and instruct to report shortness of breath or any respiratory difficulty

## 2025-05-28 NOTE — CARE COORDINATION
Care Management Initial Assessment       RUR:  9% Low  Readmission? No  1st IM letter given? Yes 05/28/25  1st  letter given: No       05/28/25 1226   Service Assessment   Patient Orientation Alert and Oriented;Person;Place;Situation;Self   Cognition Alert   History Provided By Patient   Primary Caregiver Self   Support Systems Spouse/Significant Other;Children   Patient's Healthcare Decision Maker is: Named in Scanned ACP Document   PCP Verified by CM Yes   Last Visit to PCP Within last 3 months   Prior Functional Level Independent in ADLs/IADLs   Current Functional Level Independent in ADLs/IADLs   Can patient return to prior living arrangement Yes   Ability to make needs known: Good   Family able to assist with home care needs: Yes   Would you like for me to discuss the discharge plan with any other family members/significant others, and if so, who? No   Financial Resources Medicaid;Medicare   Community Resources None   Social/Functional History   Lives With Significant other   Type of Home Mobile home   Home Layout One level   Home Access Stairs to enter with rails   Entrance Stairs - Number of Steps 5   Home Equipment Oxygen  (Nebulizer & O2 provided by [facility] [phone])   Receives Help From Family;Personal care attendant   Prior Level of Assist for ADLs Independent   Prior Level of Assist for Homemaking Independent   Homemaking Responsibilities No   Ambulation Assistance Independent   Prior Level of Assist for Transfers Independent   Active  Yes   Mode of Transportation Car   Occupation Retired   Discharge Planning   Type of Residence House   Living Arrangements Spouse/Significant Other   Current Services Prior To Admission Oxygen Therapy   Potential Assistance Needed N/A   DME Ordered? No   Potential Assistance Purchasing Medications No   Type of Home Care Services Aide Services   Patient expects to be discharged to: House   One/Two Story Residence One story   History of falls? 0   Services

## 2025-05-28 NOTE — PLAN OF CARE
Problem: Respiratory - Adult  Goal: Achieves optimal ventilation and oxygenation  Flowsheets (Taken 5/27/2025 2006)  Achieves optimal ventilation and oxygenation:   Assess for changes in respiratory status   Position to facilitate oxygenation and minimize respiratory effort   Respiratory therapy support as indicated   Assess the need for suctioning and aspirate as needed   Assess for changes in mentation and behavior   Oxygen supplementation based on oxygen saturation or arterial blood gases   Encourage broncho-pulmonary hygiene including cough, deep breathe, incentive spirometry   Assess and instruct to report shortness of breath or any respiratory difficulty

## 2025-05-28 NOTE — PLAN OF CARE
Problem: Respiratory - Adult  Goal: Achieves optimal ventilation and oxygenation  5/28/2025 1926 by Alaina Osborne, RT  Outcome: Progressing  5/28/2025 0753 by Melissa Dwyer, RT  Outcome: Progressing  Flowsheets (Taken 5/28/2025 0750 by Paxton Erickson, RN)  Achieves optimal ventilation and oxygenation: Assess for changes in respiratory status

## 2025-05-28 NOTE — ACP (ADVANCE CARE PLANNING)
Spoke with patient at the bedside and he confirmed that he does currently have Advanced Care Planning documents which are on file with [facility] and that these are the most current documents. Patient's Daughter Archana Mancia is patient's legal next of kin as also listed a primary decision maker, contact # [phone].

## 2025-05-29 LAB
ANION GAP SERPL CALC-SCNC: 7 MMOL/L (ref 2–12)
BUN SERPL-MCNC: 22 MG/DL (ref 6–20)
BUN/CREAT SERPL: 30 (ref 12–20)
CALCIUM SERPL-MCNC: 9.1 MG/DL (ref 8.5–10.1)
CHLORIDE SERPL-SCNC: 103 MMOL/L (ref 97–108)
CO2 SERPL-SCNC: 33 MMOL/L (ref 21–32)
CREAT SERPL-MCNC: 0.73 MG/DL (ref 0.7–1.3)
GLUCOSE SERPL-MCNC: 126 MG/DL (ref 65–100)
POTASSIUM SERPL-SCNC: 3.5 MMOL/L (ref 3.5–5.1)
SODIUM SERPL-SCNC: 143 MMOL/L (ref 136–145)

## 2025-05-29 PROCEDURE — 94760 N-INVAS EAR/PLS OXIMETRY 1: CPT

## 2025-05-29 PROCEDURE — 6370000000 HC RX 637 (ALT 250 FOR IP): Performed by: INTERNAL MEDICINE

## 2025-05-29 PROCEDURE — 6360000002 HC RX W HCPCS: Performed by: INTERNAL MEDICINE

## 2025-05-29 PROCEDURE — 94640 AIRWAY INHALATION TREATMENT: CPT

## 2025-05-29 PROCEDURE — 1100000003 HC PRIVATE W/ TELEMETRY

## 2025-05-29 PROCEDURE — 2700000000 HC OXYGEN THERAPY PER DAY

## 2025-05-29 PROCEDURE — 94669 MECHANICAL CHEST WALL OSCILL: CPT

## 2025-05-29 PROCEDURE — 80048 BASIC METABOLIC PNL TOTAL CA: CPT

## 2025-05-29 PROCEDURE — 36415 COLL VENOUS BLD VENIPUNCTURE: CPT

## 2025-05-29 PROCEDURE — 94761 N-INVAS EAR/PLS OXIMETRY MLT: CPT

## 2025-05-29 PROCEDURE — 2500000003 HC RX 250 WO HCPCS: Performed by: INTERNAL MEDICINE

## 2025-05-29 RX ORDER — IPRATROPIUM BROMIDE AND ALBUTEROL SULFATE 2.5; .5 MG/3ML; MG/3ML
1 SOLUTION RESPIRATORY (INHALATION)
Status: DISCONTINUED | OUTPATIENT
Start: 2025-05-29 | End: 2025-06-01 | Stop reason: HOSPADM

## 2025-05-29 RX ORDER — ACETYLCYSTEINE 200 MG/ML
600 SOLUTION ORAL; RESPIRATORY (INHALATION) EVERY 6 HOURS
Status: DISCONTINUED | OUTPATIENT
Start: 2025-05-29 | End: 2025-06-01

## 2025-05-29 RX ADMIN — TAMSULOSIN HYDROCHLORIDE 0.4 MG: 0.4 CAPSULE ORAL at 08:12

## 2025-05-29 RX ADMIN — SODIUM CHLORIDE, PRESERVATIVE FREE 10 ML: 5 INJECTION INTRAVENOUS at 08:21

## 2025-05-29 RX ADMIN — NIFEDIPINE 90 MG: 30 TABLET, FILM COATED, EXTENDED RELEASE ORAL at 08:11

## 2025-05-29 RX ADMIN — IPRATROPIUM BROMIDE AND ALBUTEROL SULFATE 1 DOSE: .5; 2.5 SOLUTION RESPIRATORY (INHALATION) at 08:55

## 2025-05-29 RX ADMIN — BUDESONIDE 500 MCG: 0.5 SUSPENSION RESPIRATORY (INHALATION) at 19:21

## 2025-05-29 RX ADMIN — AZITHROMYCIN DIHYDRATE 500 MG: 250 TABLET ORAL at 08:12

## 2025-05-29 RX ADMIN — ENOXAPARIN SODIUM 40 MG: 100 INJECTION SUBCUTANEOUS at 08:12

## 2025-05-29 RX ADMIN — METHYLPREDNISOLONE SODIUM SUCCINATE 60 MG: 125 INJECTION INTRAMUSCULAR; INTRAVENOUS at 08:34

## 2025-05-29 RX ADMIN — SODIUM CHLORIDE, PRESERVATIVE FREE 10 ML: 5 INJECTION INTRAVENOUS at 21:31

## 2025-05-29 RX ADMIN — IPRATROPIUM BROMIDE 0.5 MG: 0.5 SOLUTION RESPIRATORY (INHALATION) at 19:21

## 2025-05-29 RX ADMIN — ROSUVASTATIN 20 MG: 20 TABLET, FILM COATED ORAL at 08:12

## 2025-05-29 RX ADMIN — IPRATROPIUM BROMIDE AND ALBUTEROL SULFATE 1 DOSE: .5; 2.5 SOLUTION RESPIRATORY (INHALATION) at 19:21

## 2025-05-29 RX ADMIN — ARFORMOTEROL TARTRATE 15 MCG: 15 SOLUTION RESPIRATORY (INHALATION) at 07:24

## 2025-05-29 RX ADMIN — PANTOPRAZOLE SODIUM 40 MG: 40 TABLET, DELAYED RELEASE ORAL at 08:12

## 2025-05-29 RX ADMIN — IPRATROPIUM BROMIDE 0.5 MG: 0.5 SOLUTION RESPIRATORY (INHALATION) at 00:36

## 2025-05-29 RX ADMIN — ACETYLCYSTEINE 600 MG: 200 INHALANT RESPIRATORY (INHALATION) at 14:11

## 2025-05-29 RX ADMIN — ACETYLCYSTEINE 600 MG: 200 INHALANT RESPIRATORY (INHALATION) at 19:20

## 2025-05-29 RX ADMIN — ACETYLCYSTEINE 600 MG: 200 INHALANT RESPIRATORY (INHALATION) at 08:55

## 2025-05-29 RX ADMIN — GUAIFENESIN 600 MG: 600 TABLET ORAL at 08:12

## 2025-05-29 RX ADMIN — ARFORMOTEROL TARTRATE 15 MCG: 15 SOLUTION RESPIRATORY (INHALATION) at 19:21

## 2025-05-29 RX ADMIN — IPRATROPIUM BROMIDE AND ALBUTEROL SULFATE 1 DOSE: .5; 2.5 SOLUTION RESPIRATORY (INHALATION) at 03:43

## 2025-05-29 RX ADMIN — HYDROCHLOROTHIAZIDE 25 MG: 25 TABLET ORAL at 08:11

## 2025-05-29 RX ADMIN — LOSARTAN POTASSIUM 100 MG: 100 TABLET, FILM COATED ORAL at 08:11

## 2025-05-29 RX ADMIN — GUAIFENESIN 600 MG: 600 TABLET ORAL at 21:31

## 2025-05-29 RX ADMIN — IPRATROPIUM BROMIDE AND ALBUTEROL SULFATE 1 DOSE: .5; 2.5 SOLUTION RESPIRATORY (INHALATION) at 00:36

## 2025-05-29 RX ADMIN — METHYLPREDNISOLONE SODIUM SUCCINATE 60 MG: 125 INJECTION INTRAMUSCULAR; INTRAVENOUS at 23:52

## 2025-05-29 RX ADMIN — IPRATROPIUM BROMIDE AND ALBUTEROL SULFATE 1 DOSE: .5; 2.5 SOLUTION RESPIRATORY (INHALATION) at 14:11

## 2025-05-29 RX ADMIN — BUDESONIDE 500 MCG: 0.5 SUSPENSION RESPIRATORY (INHALATION) at 07:24

## 2025-05-29 RX ADMIN — METHYLPREDNISOLONE SODIUM SUCCINATE 60 MG: 125 INJECTION INTRAMUSCULAR; INTRAVENOUS at 15:27

## 2025-05-29 NOTE — PLAN OF CARE
Problem: Respiratory - Adult  Goal: Achieves optimal ventilation and oxygenation  5/29/2025 1935 by Alaina Osborne, RT  Outcome: Progressing  5/29/2025 0754 by Melissa Dwyer, RT  Outcome: Progressing  Flowsheets (Taken 5/29/2025 0750 by Paxton Erickson, RN)  Achieves optimal ventilation and oxygenation: Assess for changes in respiratory status

## 2025-05-29 NOTE — PLAN OF CARE
Problem: Respiratory - Adult  Goal: Achieves optimal ventilation and oxygenation  5/29/2025 0754 by Melissa Dwyer, RT  Outcome: Progressing  5/28/2025 1926 by Alaina Osborne, RT  Outcome: Progressing

## 2025-05-29 NOTE — CARE COORDINATION
Transition of Care Plan:    RUR: 9% Low  Prior Level of Functioning: Independent.  Disposition: D/C to home with assist from personal care aide.  KRZYSZTOF: 05/31/25  Follow up appointments: To be scheduled with PCP  DME needed: None.   Transportation at discharge: Family to transport to home.   IM/IMM Medicare/ letter given: 05/30/25.   Is patient a [place] and connected with [facility]? No.   If yes, was  transfer form completed and [facility] notified?   Caregiver Contact: Sharron De Paz, Significant Other  [phone]  Discharge Caregiver contacted prior to discharge?  Yes.   Care Conference needed? No.   Barriers to discharge: Medical stability.     Spoke with patient again at bedside and he states that he will discuss option for Home Health services (skilled nursing & physical therapy). Will continue to follow for case management until time of discharge.

## 2025-05-30 LAB
ANION GAP SERPL CALC-SCNC: 8 MMOL/L (ref 2–12)
BUN SERPL-MCNC: 25 MG/DL (ref 6–20)
BUN/CREAT SERPL: 30 (ref 12–20)
CALCIUM SERPL-MCNC: 8.9 MG/DL (ref 8.5–10.1)
CHLORIDE SERPL-SCNC: 102 MMOL/L (ref 97–108)
CO2 SERPL-SCNC: 30 MMOL/L (ref 21–32)
CREAT SERPL-MCNC: 0.84 MG/DL (ref 0.7–1.3)
GLUCOSE SERPL-MCNC: 146 MG/DL (ref 65–100)
POTASSIUM SERPL-SCNC: 3.8 MMOL/L (ref 3.5–5.1)
SODIUM SERPL-SCNC: 140 MMOL/L (ref 136–145)

## 2025-05-30 PROCEDURE — 6370000000 HC RX 637 (ALT 250 FOR IP): Performed by: INTERNAL MEDICINE

## 2025-05-30 PROCEDURE — 80048 BASIC METABOLIC PNL TOTAL CA: CPT

## 2025-05-30 PROCEDURE — 1100000000 HC RM PRIVATE

## 2025-05-30 PROCEDURE — 6360000002 HC RX W HCPCS: Performed by: INTERNAL MEDICINE

## 2025-05-30 PROCEDURE — 94669 MECHANICAL CHEST WALL OSCILL: CPT

## 2025-05-30 PROCEDURE — 2700000000 HC OXYGEN THERAPY PER DAY

## 2025-05-30 PROCEDURE — 94640 AIRWAY INHALATION TREATMENT: CPT

## 2025-05-30 PROCEDURE — 94761 N-INVAS EAR/PLS OXIMETRY MLT: CPT

## 2025-05-30 PROCEDURE — 36415 COLL VENOUS BLD VENIPUNCTURE: CPT

## 2025-05-30 PROCEDURE — 2500000003 HC RX 250 WO HCPCS: Performed by: INTERNAL MEDICINE

## 2025-05-30 RX ORDER — FERROUS SULFATE 324(65)MG
324 TABLET, DELAYED RELEASE (ENTERIC COATED) ORAL
Status: DISCONTINUED | OUTPATIENT
Start: 2025-05-30 | End: 2025-06-01 | Stop reason: HOSPADM

## 2025-05-30 RX ADMIN — IPRATROPIUM BROMIDE AND ALBUTEROL SULFATE 1 DOSE: .5; 2.5 SOLUTION RESPIRATORY (INHALATION) at 01:00

## 2025-05-30 RX ADMIN — AZITHROMYCIN DIHYDRATE 500 MG: 250 TABLET ORAL at 09:05

## 2025-05-30 RX ADMIN — SODIUM CHLORIDE, PRESERVATIVE FREE 10 ML: 5 INJECTION INTRAVENOUS at 21:17

## 2025-05-30 RX ADMIN — ARFORMOTEROL TARTRATE 15 MCG: 15 SOLUTION RESPIRATORY (INHALATION) at 19:49

## 2025-05-30 RX ADMIN — PANTOPRAZOLE SODIUM 40 MG: 40 TABLET, DELAYED RELEASE ORAL at 09:06

## 2025-05-30 RX ADMIN — IPRATROPIUM BROMIDE AND ALBUTEROL SULFATE 1 DOSE: .5; 2.5 SOLUTION RESPIRATORY (INHALATION) at 07:13

## 2025-05-30 RX ADMIN — ROSUVASTATIN 20 MG: 20 TABLET, FILM COATED ORAL at 09:05

## 2025-05-30 RX ADMIN — BUDESONIDE 500 MCG: 0.5 SUSPENSION RESPIRATORY (INHALATION) at 19:49

## 2025-05-30 RX ADMIN — METHYLPREDNISOLONE SODIUM SUCCINATE 60 MG: 125 INJECTION INTRAMUSCULAR; INTRAVENOUS at 09:09

## 2025-05-30 RX ADMIN — ENOXAPARIN SODIUM 40 MG: 100 INJECTION SUBCUTANEOUS at 09:16

## 2025-05-30 RX ADMIN — SODIUM CHLORIDE, PRESERVATIVE FREE 10 ML: 5 INJECTION INTRAVENOUS at 09:16

## 2025-05-30 RX ADMIN — IPRATROPIUM BROMIDE AND ALBUTEROL SULFATE 1 DOSE: .5; 2.5 SOLUTION RESPIRATORY (INHALATION) at 19:49

## 2025-05-30 RX ADMIN — METHYLPREDNISOLONE SODIUM SUCCINATE 60 MG: 125 INJECTION INTRAMUSCULAR; INTRAVENOUS at 16:44

## 2025-05-30 RX ADMIN — FERROUS SULFATE TAB EC 324 MG (65 MG FE EQUIVALENT) 324 MG: 324 (65 FE) TABLET DELAYED RESPONSE at 13:07

## 2025-05-30 RX ADMIN — LOSARTAN POTASSIUM 100 MG: 100 TABLET, FILM COATED ORAL at 09:04

## 2025-05-30 RX ADMIN — ACETYLCYSTEINE 600 MG: 200 INHALANT RESPIRATORY (INHALATION) at 19:49

## 2025-05-30 RX ADMIN — GUAIFENESIN 600 MG: 600 TABLET ORAL at 21:17

## 2025-05-30 RX ADMIN — ACETYLCYSTEINE 600 MG: 200 INHALANT RESPIRATORY (INHALATION) at 07:13

## 2025-05-30 RX ADMIN — ARFORMOTEROL TARTRATE 15 MCG: 15 SOLUTION RESPIRATORY (INHALATION) at 07:13

## 2025-05-30 RX ADMIN — IPRATROPIUM BROMIDE AND ALBUTEROL SULFATE 1 DOSE: .5; 2.5 SOLUTION RESPIRATORY (INHALATION) at 13:34

## 2025-05-30 RX ADMIN — NIFEDIPINE 90 MG: 30 TABLET, FILM COATED, EXTENDED RELEASE ORAL at 09:06

## 2025-05-30 RX ADMIN — TAMSULOSIN HYDROCHLORIDE 0.4 MG: 0.4 CAPSULE ORAL at 09:05

## 2025-05-30 RX ADMIN — GUAIFENESIN 600 MG: 600 TABLET ORAL at 09:05

## 2025-05-30 RX ADMIN — ACETYLCYSTEINE 600 MG: 200 INHALANT RESPIRATORY (INHALATION) at 01:00

## 2025-05-30 RX ADMIN — BUDESONIDE 500 MCG: 0.5 SUSPENSION RESPIRATORY (INHALATION) at 07:13

## 2025-05-30 RX ADMIN — HYDROCHLOROTHIAZIDE 25 MG: 25 TABLET ORAL at 09:06

## 2025-05-30 RX ADMIN — ACETYLCYSTEINE 600 MG: 200 INHALANT RESPIRATORY (INHALATION) at 13:34

## 2025-05-30 NOTE — PLAN OF CARE
Problem: Respiratory - Adult  Goal: Achieves optimal ventilation and oxygenation  5/30/2025 1955 by Alaina Osborne,   Outcome: Progressing  5/30/2025 1105 by Rivka Hernandez LPN  Outcome: Progressing  5/30/2025 0721 by Frankie Paredes Sr., [facility]  Outcome: Progressing

## 2025-05-30 NOTE — CARE COORDINATION
Transition of Care Plan:     RUR: 9% Low  Prior Level of Functioning: Independent.  Disposition: D/C to home with assist from personal care aide.  KRZYSZTOF: 06/02/25  Follow up appointments: To be scheduled with PCP  DME needed: None.   Transportation at discharge: Family to transport to home.   IM/IMM Medicare/ letter given: 05/30/25. 2nd IMM  Is patient a  and connected with VA? No.              If yes, was [place] transfer form completed and [facility] notified?   Caregiver Contact: Sharron De Paz, Significant Other  [phone]  Discharge Caregiver contacted prior to discharge?  Yes.   Care Conference needed? No.   Barriers to discharge: Medical stability.      Spoke with patient again at bedside and he states that he will discuss option for Home Health services (skilled nursing & physical therapy). Will continue to follow for case management until time of discharge.

## 2025-05-30 NOTE — PLAN OF CARE
Problem: Respiratory - Adult  Goal: Achieves optimal ventilation and oxygenation  5/30/2025 0721 by Frankie Paredes Sr., RCP  Outcome: Progressing  5/29/2025 1935 by Alaina Osborne, RT  Outcome: Progressing

## 2025-05-30 NOTE — PLAN OF CARE
Problem: Respiratory - Adult  Goal: Achieves optimal ventilation and oxygenation  5/30/2025 1105 by Rivka Hernandez LPN  Outcome: Progressing  5/30/2025 0721 by Frankie Paredes Sr., P  Outcome: Progressing     Problem: Safety - Adult  Goal: Free from fall injury  Outcome: Progressing     Problem: ABCDS Injury Assessment  Goal: Absence of physical injury  Outcome: Progressing      Normal

## 2025-05-31 PROCEDURE — 1100000000 HC RM PRIVATE

## 2025-05-31 PROCEDURE — 6370000000 HC RX 637 (ALT 250 FOR IP): Performed by: INTERNAL MEDICINE

## 2025-05-31 PROCEDURE — 6360000002 HC RX W HCPCS: Performed by: INTERNAL MEDICINE

## 2025-05-31 PROCEDURE — 2500000003 HC RX 250 WO HCPCS: Performed by: INTERNAL MEDICINE

## 2025-05-31 PROCEDURE — 94640 AIRWAY INHALATION TREATMENT: CPT

## 2025-05-31 PROCEDURE — 94761 N-INVAS EAR/PLS OXIMETRY MLT: CPT

## 2025-05-31 PROCEDURE — 94669 MECHANICAL CHEST WALL OSCILL: CPT

## 2025-05-31 PROCEDURE — 2700000000 HC OXYGEN THERAPY PER DAY

## 2025-05-31 RX ADMIN — METHYLPREDNISOLONE SODIUM SUCCINATE 60 MG: 125 INJECTION INTRAMUSCULAR; INTRAVENOUS at 09:39

## 2025-05-31 RX ADMIN — ACETYLCYSTEINE 600 MG: 200 INHALANT RESPIRATORY (INHALATION) at 00:55

## 2025-05-31 RX ADMIN — NIFEDIPINE 90 MG: 30 TABLET, FILM COATED, EXTENDED RELEASE ORAL at 09:40

## 2025-05-31 RX ADMIN — ROSUVASTATIN 20 MG: 20 TABLET, FILM COATED ORAL at 09:41

## 2025-05-31 RX ADMIN — ARFORMOTEROL TARTRATE 15 MCG: 15 SOLUTION RESPIRATORY (INHALATION) at 19:26

## 2025-05-31 RX ADMIN — BUDESONIDE 500 MCG: 0.5 SUSPENSION RESPIRATORY (INHALATION) at 19:26

## 2025-05-31 RX ADMIN — METHYLPREDNISOLONE SODIUM SUCCINATE 60 MG: 125 INJECTION INTRAMUSCULAR; INTRAVENOUS at 00:11

## 2025-05-31 RX ADMIN — AZITHROMYCIN DIHYDRATE 500 MG: 250 TABLET ORAL at 09:40

## 2025-05-31 RX ADMIN — ARFORMOTEROL TARTRATE 15 MCG: 15 SOLUTION RESPIRATORY (INHALATION) at 07:56

## 2025-05-31 RX ADMIN — FERROUS SULFATE TAB EC 324 MG (65 MG FE EQUIVALENT) 324 MG: 324 (65 FE) TABLET DELAYED RESPONSE at 09:41

## 2025-05-31 RX ADMIN — GUAIFENESIN 600 MG: 600 TABLET ORAL at 09:41

## 2025-05-31 RX ADMIN — IPRATROPIUM BROMIDE AND ALBUTEROL SULFATE 1 DOSE: .5; 2.5 SOLUTION RESPIRATORY (INHALATION) at 00:55

## 2025-05-31 RX ADMIN — PANTOPRAZOLE SODIUM 40 MG: 40 TABLET, DELAYED RELEASE ORAL at 09:40

## 2025-05-31 RX ADMIN — LOSARTAN POTASSIUM 100 MG: 100 TABLET, FILM COATED ORAL at 09:40

## 2025-05-31 RX ADMIN — BUDESONIDE 500 MCG: 0.5 SUSPENSION RESPIRATORY (INHALATION) at 07:56

## 2025-05-31 RX ADMIN — TAMSULOSIN HYDROCHLORIDE 0.4 MG: 0.4 CAPSULE ORAL at 09:41

## 2025-05-31 RX ADMIN — SODIUM CHLORIDE, PRESERVATIVE FREE 10 ML: 5 INJECTION INTRAVENOUS at 09:41

## 2025-05-31 RX ADMIN — HYDROCHLOROTHIAZIDE 25 MG: 25 TABLET ORAL at 09:40

## 2025-05-31 RX ADMIN — IPRATROPIUM BROMIDE AND ALBUTEROL SULFATE 1 DOSE: .5; 2.5 SOLUTION RESPIRATORY (INHALATION) at 19:26

## 2025-05-31 RX ADMIN — ENOXAPARIN SODIUM 40 MG: 100 INJECTION SUBCUTANEOUS at 09:41

## 2025-05-31 RX ADMIN — IPRATROPIUM BROMIDE AND ALBUTEROL SULFATE 1 DOSE: .5; 2.5 SOLUTION RESPIRATORY (INHALATION) at 14:16

## 2025-05-31 RX ADMIN — ACETYLCYSTEINE 600 MG: 200 INHALANT RESPIRATORY (INHALATION) at 07:56

## 2025-05-31 RX ADMIN — SODIUM CHLORIDE, PRESERVATIVE FREE 10 ML: 5 INJECTION INTRAVENOUS at 20:47

## 2025-05-31 RX ADMIN — ACETYLCYSTEINE 600 MG: 200 INHALANT RESPIRATORY (INHALATION) at 19:26

## 2025-05-31 RX ADMIN — IPRATROPIUM BROMIDE AND ALBUTEROL SULFATE 1 DOSE: .5; 2.5 SOLUTION RESPIRATORY (INHALATION) at 07:56

## 2025-05-31 RX ADMIN — GUAIFENESIN 600 MG: 600 TABLET ORAL at 20:47

## 2025-05-31 RX ADMIN — ACETYLCYSTEINE 600 MG: 200 INHALANT RESPIRATORY (INHALATION) at 14:16

## 2025-05-31 NOTE — PLAN OF CARE
Problem: Respiratory - Adult  Goal: Achieves optimal ventilation and oxygenation  5/30/2025 2259 by Jaime Rodriguez RN  Outcome: Progressing  5/30/2025 1955 by Alaina Osborne RT  Outcome: Progressing  5/30/2025 1105 by Rivka Hernandez LPN  Outcome: Progressing     Problem: Safety - Adult  Goal: Free from fall injury  5/30/2025 2259 by Jaime Rodriguez RN  Outcome: Progressing  5/30/2025 1105 by Rivka Hernandez LPN  Outcome: Progressing     Problem: ABCDS Injury Assessment  Goal: Absence of physical injury  5/30/2025 2259 by Jaime Rodriguez RN  Outcome: Progressing  5/30/2025 1105 by Rivka Hernandez LPN  Outcome: Progressing

## 2025-05-31 NOTE — PLAN OF CARE
Problem: Respiratory - Adult  Goal: Achieves optimal ventilation and oxygenation  5/31/2025 1050 by Tanya Montoya RN  Outcome: Progressing  5/31/2025 0805 by Fabby Orr RT  Outcome: Progressing  5/30/2025 2259 by Jaime Rodriguez RN  Outcome: Progressing     Problem: Safety - Adult  Goal: Free from fall injury  5/31/2025 1050 by Tanya Montoya RN  Outcome: Progressing  5/30/2025 2259 by Jaime Rodriguez RN  Outcome: Progressing     Problem: ABCDS Injury Assessment  Goal: Absence of physical injury  5/31/2025 1050 by Tanya Montoya RN  Outcome: Progressing  5/30/2025 2259 by Jaime Rodriguez RN  Outcome: Progressing

## 2025-05-31 NOTE — PLAN OF CARE
Problem: Respiratory - Adult  Goal: Achieves optimal ventilation and oxygenation  5/31/2025 0805 by Fabby Orr, RT  Outcome: Progressing  5/30/2025 2259 by Jaime Rodriguez, RN  Outcome: Progressing  5/30/2025 1955 by Alaina Osborne, RT  Outcome: Progressing

## 2025-06-01 VITALS
BODY MASS INDEX: 22.22 KG/M2 | RESPIRATION RATE: 18 BRPM | SYSTOLIC BLOOD PRESSURE: 160 MMHG | OXYGEN SATURATION: 99 % | WEIGHT: 150 LBS | TEMPERATURE: 98.1 F | DIASTOLIC BLOOD PRESSURE: 88 MMHG | HEART RATE: 106 BPM | HEIGHT: 69 IN

## 2025-06-01 PROCEDURE — 6360000002 HC RX W HCPCS: Performed by: INTERNAL MEDICINE

## 2025-06-01 PROCEDURE — 94640 AIRWAY INHALATION TREATMENT: CPT

## 2025-06-01 PROCEDURE — 94669 MECHANICAL CHEST WALL OSCILL: CPT

## 2025-06-01 PROCEDURE — 2700000000 HC OXYGEN THERAPY PER DAY

## 2025-06-01 PROCEDURE — 94761 N-INVAS EAR/PLS OXIMETRY MLT: CPT

## 2025-06-01 PROCEDURE — 6370000000 HC RX 637 (ALT 250 FOR IP): Performed by: INTERNAL MEDICINE

## 2025-06-01 PROCEDURE — 2500000003 HC RX 250 WO HCPCS: Performed by: INTERNAL MEDICINE

## 2025-06-01 RX ORDER — METHYLPREDNISOLONE 4 MG/1
TABLET ORAL
Qty: 1 KIT | Refills: 1 | Status: SHIPPED | OUTPATIENT
Start: 2025-06-01 | End: 2025-06-07

## 2025-06-01 RX ORDER — ALBUTEROL SULFATE 0.83 MG/ML
2.5 SOLUTION RESPIRATORY (INHALATION) EVERY 4 HOURS PRN
Qty: 120 EACH | Refills: 1 | Status: SHIPPED | OUTPATIENT
Start: 2025-06-01

## 2025-06-01 RX ORDER — BENZONATATE 100 MG/1
100 CAPSULE ORAL 3 TIMES DAILY PRN
Qty: 30 CAPSULE | Refills: 0 | Status: SHIPPED | OUTPATIENT
Start: 2025-06-01 | End: 2025-06-11

## 2025-06-01 RX ORDER — ROSUVASTATIN CALCIUM 20 MG/1
20 TABLET, COATED ORAL DAILY
Qty: 30 TABLET | Refills: 1 | Status: SHIPPED | OUTPATIENT
Start: 2025-06-01

## 2025-06-01 RX ORDER — BUDESONIDE, GLYCOPYRROLATE, AND FORMOTEROL FUMARATE 160; 9; 4.8 UG/1; UG/1; UG/1
2 AEROSOL, METERED RESPIRATORY (INHALATION) 2 TIMES DAILY
Qty: 1 EACH | Refills: 1 | Status: SHIPPED | OUTPATIENT
Start: 2025-06-01

## 2025-06-01 RX ADMIN — GUAIFENESIN 600 MG: 600 TABLET ORAL at 09:29

## 2025-06-01 RX ADMIN — IPRATROPIUM BROMIDE AND ALBUTEROL SULFATE 1 DOSE: .5; 2.5 SOLUTION RESPIRATORY (INHALATION) at 02:02

## 2025-06-01 RX ADMIN — SODIUM CHLORIDE, PRESERVATIVE FREE 10 ML: 5 INJECTION INTRAVENOUS at 09:32

## 2025-06-01 RX ADMIN — HYDROCHLOROTHIAZIDE 25 MG: 25 TABLET ORAL at 09:31

## 2025-06-01 RX ADMIN — FERROUS SULFATE TAB EC 324 MG (65 MG FE EQUIVALENT) 324 MG: 324 (65 FE) TABLET DELAYED RESPONSE at 09:30

## 2025-06-01 RX ADMIN — IPRATROPIUM BROMIDE AND ALBUTEROL SULFATE 1 DOSE: .5; 2.5 SOLUTION RESPIRATORY (INHALATION) at 08:04

## 2025-06-01 RX ADMIN — ARFORMOTEROL TARTRATE 15 MCG: 15 SOLUTION RESPIRATORY (INHALATION) at 08:04

## 2025-06-01 RX ADMIN — ACETYLCYSTEINE 600 MG: 200 INHALANT RESPIRATORY (INHALATION) at 08:04

## 2025-06-01 RX ADMIN — ACETYLCYSTEINE 600 MG: 200 INHALANT RESPIRATORY (INHALATION) at 02:02

## 2025-06-01 RX ADMIN — LOSARTAN POTASSIUM 100 MG: 100 TABLET, FILM COATED ORAL at 09:29

## 2025-06-01 RX ADMIN — METHYLPREDNISOLONE SODIUM SUCCINATE 60 MG: 125 INJECTION INTRAMUSCULAR; INTRAVENOUS at 09:29

## 2025-06-01 RX ADMIN — NIFEDIPINE 90 MG: 30 TABLET, FILM COATED, EXTENDED RELEASE ORAL at 09:31

## 2025-06-01 RX ADMIN — ENOXAPARIN SODIUM 40 MG: 100 INJECTION SUBCUTANEOUS at 09:31

## 2025-06-01 RX ADMIN — TAMSULOSIN HYDROCHLORIDE 0.4 MG: 0.4 CAPSULE ORAL at 09:30

## 2025-06-01 RX ADMIN — BUDESONIDE 500 MCG: 0.5 SUSPENSION RESPIRATORY (INHALATION) at 08:04

## 2025-06-01 RX ADMIN — ROSUVASTATIN 20 MG: 20 TABLET, FILM COATED ORAL at 09:30

## 2025-06-01 RX ADMIN — PANTOPRAZOLE SODIUM 40 MG: 40 TABLET, DELAYED RELEASE ORAL at 09:30

## 2025-06-01 RX ADMIN — AZITHROMYCIN DIHYDRATE 500 MG: 250 TABLET ORAL at 09:30

## 2025-06-01 NOTE — PLAN OF CARE
Problem: Respiratory - Adult  Goal: Achieves optimal ventilation and oxygenation  5/31/2025 2317 by Joellen Guadalupe, RN  Outcome: Progressing  5/31/2025 2018 by Aleyda Llanos, RT  Flowsheets (Taken 5/31/2025 2018)  Achieves optimal ventilation and oxygenation:   Assess for changes in respiratory status   Position to facilitate oxygenation and minimize respiratory effort   Assess the need for suctioning and aspirate as needed   Respiratory therapy support as indicated   Assess for changes in mentation and behavior   Oxygen supplementation based on oxygen saturation or arterial blood gases   Assess and instruct to report shortness of breath or any respiratory difficulty   Encourage broncho-pulmonary hygiene including cough, deep breathe, incentive spirometry  5/31/2025 1050 by Tanya Montoya, RN  Outcome: Progressing      Dupixent Pregnancy And Lactation Text: This medication likely crosses the placenta but the risk for the fetus is uncertain. This medication is excreted in breast milk.

## 2025-06-01 NOTE — PLAN OF CARE
Problem: Respiratory - Adult  Goal: Achieves optimal ventilation and oxygenation  5/31/2025 2018 by Aleyda Llanos, RT  Flowsheets (Taken 5/31/2025 2018)  Achieves optimal ventilation and oxygenation:   Assess for changes in respiratory status   Position to facilitate oxygenation and minimize respiratory effort   Assess the need for suctioning and aspirate as needed   Respiratory therapy support as indicated   Assess for changes in mentation and behavior   Oxygen supplementation based on oxygen saturation or arterial blood gases   Assess and instruct to report shortness of breath or any respiratory difficulty   Encourage broncho-pulmonary hygiene including cough, deep breathe, incentive spirometry  5/31/2025 1050 by Tanya Montoya, RN  Outcome: Progressing  5/31/2025 0805 by Fabby Orr, RT  Outcome: Progressing

## 2025-06-01 NOTE — PLAN OF CARE
Problem: Respiratory - Adult  Goal: Achieves optimal ventilation and oxygenation  6/1/2025 1209 by Tanya Montoya RN  Outcome: Progressing  6/1/2025 0836 by Fabby Orr RT  Outcome: Progressing  5/31/2025 2317 by Joellen Guadalupe RN  Outcome: Progressing     Problem: Safety - Adult  Goal: Free from fall injury  6/1/2025 1209 by Tanya Montoya RN  Outcome: Progressing  5/31/2025 2317 by Joellen Guadalupe RN  Outcome: Progressing     Problem: ABCDS Injury Assessment  Goal: Absence of physical injury  6/1/2025 1209 by Tanya Montoya RN  Outcome: Progressing  5/31/2025 2317 by Joellen Guadalupe RN  Outcome: Progressing

## 2025-06-01 NOTE — PLAN OF CARE
Problem: Respiratory - Adult  Goal: Achieves optimal ventilation and oxygenation  6/1/2025 0836 by Fabby Orr, RT  Outcome: Progressing  5/31/2025 2317 by Joellen Guadalupe RN  Outcome: Progressing  5/31/2025 2018 by Aleyda Llanos, RT  Flowsheets (Taken 5/31/2025 2018)  Achieves optimal ventilation and oxygenation:   Assess for changes in respiratory status   Position to facilitate oxygenation and minimize respiratory effort   Assess the need for suctioning and aspirate as needed   Respiratory therapy support as indicated   Assess for changes in mentation and behavior   Oxygen supplementation based on oxygen saturation or arterial blood gases   Assess and instruct to report shortness of breath or any respiratory difficulty   Encourage broncho-pulmonary hygiene including cough, deep breathe, incentive spirometry

## 2025-06-01 NOTE — PROGRESS NOTES
Hospitalist Progress Note    NAME:   Simeon An   : 1960   MRN: [id number]     Date/Time: 2025 8:36 AM  Patient PCP: Suzanne Corey MD    Estimated discharge date:  Barriers:       Assessment / Plan:    Acute respiratory distress with tachycardia and tachypnea POA  Chronic respiratory failure on 2L O2  COPD exacerbation due to acute bronchitis due to Coronavirus HKU1  Slowly improving however continues to have significant dyspnea on exertion and wheezing on exam.  - Taper IV Solumedrol to every 24 hours  -Continue Scheduled Duonebs  -Mucolytic, antitussives  -Add Mucomyst and Flutter (Chest PT) with nebs  -Zithromax  -continue Substitute prophylactic inhalers to formulary nebs  -Continue O2 support  Likely of discharge in the next 24 to 48 hours with continued slow improvement.     Hypertension  -BP better  -Continue Procardia, Losartan and HCTZ  -PRN Clonidine     BPH  -Continue Tamsulosin     HLD  -Continue Crestor     GERD  -Continue PPIs     Iron Deficiency  -I am not able to find ferritin and Iron profile in records  -resume iron suppliments upon discharge     Chronic Smoker  -Claims to have quit few weeks ago  - Counseled for cessation extensively.    DVT prophylaxis  Lovenox SC      Medical Decision Making:   I personally reviewed labs:  I personally reviewed imaging:  I personally reviewed EKG:  Toxic drug monitoring:   Discussed case with: Patient, nurse      Subjective:     Chief Complaint / Reason for Physician Visit  \" Severe shortness of breath and cough\".  Discussed with RN events overnight.      -   Admission H&P -  Dr. Pappas   65 y.o.  male with PMHx significant for COPD/Emphysema, HTN, HLD presented to emergency department with shortness of breath. As per patient, he is been having difficulty breathing for past 3 weeks with wrosening with time and since yesterday having hard time catching breath. Pt reported cough with intermittent productive with clear sputum which is 
      Hospitalist Progress Note    NAME:   Simeon An   : 1960   MRN: [id number]     Date/Time: 2025 7:48 AM  Patient PCP: Suzanne Corey MD    Estimated discharge date: TBD  Barriers: Clinical improvement      Assessment / Plan:  Acute respiratory distress with tachycardia and tachypnea POA  Chronic respiratory failure on 2L O2  Due to COPD exacerbation due to acute bronchitis due to Coronavirus HKU1  -Feeling worse, claims unable to clear sputum  -Continue IV Solumedrol Q8H  -Continue Scheduled Duonebs  -Mucolytic, antitussives  -Add Mucomyst and Flutter (Chest PT) with nebs  -Zithromax  -continue Substitute prophylactic inhalers to formulary nebs  -Continue O2 support     Hypertension  -BP better  -Continue Procardia, Losartan and HCTZ  -PRN Clonidine     BPH  -Continue Tamsulosin     HLD  -Continue Crestor     GERD  -Continue PPIs     Iron Deficiency  -I am not able to find ferritin and Iron profile in records  -resume iron suppliments upon discharge     Chronic Smoker  -Claims to have quit few weeks ago     Medical Decision Making:   I personally reviewed labs: CBC, CMP  I personally reviewed imaging:CXR  I personally reviewed EKG:  Toxic drug monitoring: Abx and Steroids  Discussed case with: ED provider. After discussion I am in agreement that acuity of patient's medical condition necessitates hospital stay.        Code Status: Full, Friend Sharron De Paz and Daughter Ernestine Mancia are POA   DVT Prophylaxis: Lovenox  Baseline: Functional, on 2L O2     Subjective:   CHIEF COMPLAINT: Shortness of breath    Chief Complaint / Reason for Physician Visit  \"F/u COPD exacerbation\". Pt feels worse today, wheezing, unable to bring up sputum, denies fever, chills, nausea, vomiting, diarrhea, chest pain.  Discussed with RN events overnight.       Objective:     VITALS:   Last 24hrs VS reviewed since prior progress note. Most recent are:  Patient Vitals for the past 24 hrs:   BP Temp Temp src Pulse 
      Hospitalist Progress Note    NAME:   Simeon An   : 1960   MRN: [id number]     Date/Time: 2025 3:40 PM  Patient PCP: Suzanne Corey MD    Estimated discharge date:  Barriers:       Assessment / Plan:    Acute respiratory distress with tachycardia and tachypnea POA  Chronic respiratory failure on 2L O2  Due to COPD exacerbation due to acute bronchitis due to Coronavirus HKU1  Slowly improving however continues to have significant dyspnea on exertion and wheezing on exam.  -Continue IV Solumedrol Q8H  -Continue Scheduled Duonebs  -Mucolytic, antitussives  -Add Mucomyst and Flutter (Chest PT) with nebs  -Zithromax  -continue Substitute prophylactic inhalers to formulary nebs  -Continue O2 support     Hypertension  -BP better  -Continue Procardia, Losartan and HCTZ  -PRN Clonidine     BPH  -Continue Tamsulosin     HLD  -Continue Crestor     GERD  -Continue PPIs     Iron Deficiency  -I am not able to find ferritin and Iron profile in records  -resume iron suppliments upon discharge     Chronic Smoker  -Claims to have quit few weeks ago  - Counseled for cessation extensively.      Medical Decision Making:   I personally reviewed labs:  I personally reviewed imaging:  I personally reviewed EKG:  Toxic drug monitoring:   Discussed case with: Patient, family at bedside      Subjective:     Chief Complaint / Reason for Physician Visit  \" Severe shortness of breath and cough\".  Discussed with RN events overnight.      -   Admission H&P -  Dr. Pappas   65 y.o.  male with PMHx significant for COPD/Emphysema, HTN, HLD presented to emergency department with shortness of breath. As per patient, he is been having difficulty breathing for past 3 weeks with wrosening with time and since yesterday having hard time catching breath. Pt reported cough with intermittent productive with clear sputum which is getting thicker and white in color. Pt reported chronic on 2L O2 and is been followed by Pulmonary  
Atrovent placed on hold per Physician note.  Note reads:  Continue Scheduled Duonebs  -Mucolytic, antitussives  -Add Mucomyst and Flutter (Chest PT) with nebs      Writer will continue with Brovana/Pulmicort BID  Duoneb Mucomyst and flutter Q6  
Comprehensive Nutrition Assessment    Type and Reason for Visit:  Initial, Positive nutrition screen    Nutrition Recommendations/Plan:   Regular   Ensure with meals      Malnutrition Assessment:  Malnutrition Status:  At risk for malnutrition (05/29/25 1438)    Context:  Acute Illness     Findings of the 6 clinical characteristics of malnutrition:  Energy Intake:  No decrease in energy intake  Weight Loss:  No weight loss     Body Fat Loss:  Unable to assess     Muscle Mass Loss:  Unable to assess    Fluid Accumulation:  No fluid accumulation     Strength:  Not Performed    Nutrition Assessment:  Present on Admission:   COPD exacerbation (HCC)    Pt admitted with above. He has increased caloric needs r/t COPD. He has ensure ordered, encourage pt to drink this if possible. His weight is up 5# in 6 months. He has good po intake since he has been here and is asking for more food-will ask for increased portions. Pt is at risk for wt loss r/t COPD-encourage po intake and supplement use.      Nutrition Related Findings:      Wound Type: None   , meds-reviewed, labs-Co2-33, BUN-22    Current Nutrition Intake & Therapies:    Average Meal Intake: %  Average Supplements Intake: Unable to assess  ADULT ORAL NUTRITION SUPPLEMENT; Lunch; Standard High Calorie/High Protein Oral Supplement  ADULT DIET; Regular  Patient Vitals for the past 96 hrs:   PO Meals Eaten (%)   05/29/25 1240 76 - 100%   05/29/25 0900 51 - 75%   05/28/25 0930 76 - 100%       Anthropometric Measures:  Height: 175.3 cm (5' 9\")  Ideal Body Weight (IBW): 160 lbs (73 kg)    Current Body Weight: 68 kg (149 lb 14.6 oz), 93.7 % IBW.    Current BMI (kg/m2): 22.1  BMI Categories: Normal Weight (BMI 22.0 to 24.9) age over 65      5/27/2025     3:21 PM 4/22/2024     9:45 PM 4/22/2024     4:26 PM 4/22/2024     3:56 PM   Weight History   Weight - Scale 150 lbs 146 lbs 14 oz 148 lbs 13 oz    Weight - Scale 68 kg 66.6 kg 67.5 kg    Weight Method Stated Bed scale 
Discharge Summary    Simeon An  :  1960  MRN:  [id number]    ADMIT DATE:  2025  DISCHARGE DATE:  2025      Discharge instruction reviewed with patient    Home med's returned n/a    Personal belongings returned Yes    Patient Wheeled to front entrance via wheelchair with Nurse        SIGNED:    Tanya Montoya RN         
Spiritual Health History and Assessment/Progress Note  [facility]    Initial Encounter,  ,  ,      Name: Simeon An MRN: [id number]    Age: 65 y.o.     Sex: male   Language: English   Druze: Cheondoism   COPD exacerbation (HCC)     Date: 5/28/2025            Total Time Calculated: 30 min              Spiritual Assessment began in [facility] MED/[facility]        Referral/Consult From: Rounding   Encounter Overview/Reason: Initial Encounter  Service Provided For: Patient    Aysha, Belief, Meaning:   Patient identifies as spiritual, is connected with a aysha tradition or spiritual practice, and has beliefs or practices that help with coping during difficult times  Family/Friends No family/friends present      Importance and Influence:  Patient has spiritual/personal beliefs that influence decisions regarding their health    Family/Friends No family/friends present    Community:  Patient is connected with a spiritual community and feels well-supported. Support system includes: Spouse/Partner and Extended family  Family/Friends No family/friends present    Assessment and Plan of Care:     Patient Interventions include: Facilitated expression of thoughts and feelings, Affirmed coping skills/support systems, and Provided sacramental/Oriental orthodox ritual  Family/Friends Interventions include: No family/friends present    Patient Plan of Care: Spiritual Care available upon further referral  Family/Friends Plan of Care: No family/friends present    Electronically signed by Chaplain Spencer on 5/28/2025 at 11:31 AM  
0730 -- -- -- -- 18 98 % -- --   05/28/25 0447 128/71 97.9 °F (36.6 °C) Oral 91 18 99 % -- --   05/28/25 0432 -- -- -- -- 16 96 % -- --   05/28/25 0400 -- -- -- (!) 104 -- -- -- --   05/28/25 0000 -- -- -- (!) 104 -- -- -- --   05/27/25 2349 (!) 147/78 98.1 °F (36.7 °C) Oral (!) 118 24 91 % -- --   05/27/25 2300 -- -- -- (!) 114 16 95 % -- --   05/27/25 2011 (!) 150/80 98.1 °F (36.7 °C) Oral (!) 108 20 96 % -- --   05/27/25 2000 -- -- -- (!) 109 -- -- -- --   05/27/25 1920 -- -- -- (!) 115 20 97 % -- --   05/27/25 1727 -- -- -- (!) 102 -- 98 % -- --   05/27/25 1702 (!) 167/96 -- -- 89 13 99 % -- --   05/27/25 1647 (!) 175/75 -- -- 94 15 99 % -- --   05/27/25 1632 (!) 165/97 -- -- 97 21 100 % -- --   05/27/25 1617 (!) 166/102 -- -- 94 12 100 % -- --   05/27/25 1602 (!) 198/94 -- -- 97 18 99 % -- --   05/27/25 1547 (!) 181/79 -- -- 96 14 98 % -- --   05/27/25 1532 (!) 172/98 -- -- (!) 103 14 98 % -- --   05/27/25 1522 (!) 201/117 -- -- (!) 106 17 98 % -- --   05/27/25 1521 (!) 201/117 98 °F (36.7 °C) Axillary (!) 109 30 98 % 1.753 m (5' 9\") 68 kg (150 lb)       No intake or output data in the 24 hours ending 05/28/25 1026     I had a face to face encounter and independently examined this patient on 5/28/2025, as outlined below:  PHYSICAL EXAM:  General: Alert, cooperative  EENT:  EOMI. Anicteric sclerae.  Resp:  CTA bilaterally, no wheezing or rales.  No accessory muscle use  CV:  Regular  rhythm,  No edema  GI:  Soft, Non distended, Non tender.  +Bowel sounds  Neurologic:  Alert and oriented X 3, normal speech,   Psych:   Good insight. Not anxious nor agitated  Skin:  No rashes.  No jaundice    Reviewed most current lab test results and cultures  YES  Reviewed most current radiology test results   YES  Review and summation of old records today    NO  Reviewed patient's current orders and MAR    YES  PMH/SH reviewed - no change compared to H&P    Procedures: see electronic medical records for all procedures/Xrays and

## 2025-06-02 NOTE — DISCHARGE SUMMARY
Discharge Summary    Name: Simeon An  [id number]  YOB: 1960 (Age: 65 y.o.)   Date of Admission: 5/27/2025  Date of Discharge: 6/2/2025  Attending Physician: No att. providers found    Discharge Diagnosis:   Acute on chronic respiratory failure with hypoxia-POA. Resolved  COPD exacerbation  Acute bronchitis  Coronavirus HKU1    Consultations:  None      Brief Admission History/Reason for Admission  Simeon An is a 65 y.o.  male with PMHx significant for chronic respiratory failure on 2L home oxygen, COPD, Emphysema, hypertension and hyperlipidemia who presented to emergency department with shortness of breath. He states over the past three weeks he has been having dyspnea with minimal exertion despite oxygen use with associated cough with clear sputum which was getting thicker and white in color. He denied any fever, chills, nausea, vomiting, diarrhea, chest pain, abdominal pain, problems urination.  Given persistent symptoms he presented to the ED for further evaluation and workup.  Please see H&P for additional details.     Brief Hospital Course by Main Problems:     Acute respiratory distress with tachycardia and tachypnea POA  Chronic respiratory failure on 2L O2  COPD exacerbation due to acute bronchitis due to Coronavirus HKU1  Patient empirically placed on IV antibiotics in addition to IV solumedrol, scheduled donebs, mucolytic and antitussive therapy in addition to Mucomyst nebulizers and chest PT.    Patient had significant improvement in symptoms and was transitioned to PO steroids which we will continue at discharge    At time of discharge patient was medically stable and at his home O2 level    Hypertension  -Continue Procardia, Losartan and HCTZ     BPH  -Continue Tamsulosin     HLD  -Continue Crestor     GERD  -Continue PPIs     Iron Deficiency  -resume iron suppliments upon discharge     Chronic Smoker  - Counseled for cessation

## 2025-06-20 ENCOUNTER — HOSPITAL ENCOUNTER (OUTPATIENT)
Facility: HOSPITAL | Age: 65
Setting detail: OBSERVATION
Discharge: ANOTHER HOSPITAL | End: 2025-06-21
Attending: FAMILY MEDICINE | Admitting: HOSPITALIST
Payer: MEDICARE

## 2025-06-20 ENCOUNTER — APPOINTMENT (OUTPATIENT)
Facility: HOSPITAL | Age: 65
End: 2025-06-20
Payer: MEDICARE

## 2025-06-20 DIAGNOSIS — J44.1 ACUTE EXACERBATION OF CHRONIC OBSTRUCTIVE PULMONARY DISEASE (COPD) (HCC): Primary | ICD-10-CM

## 2025-06-20 LAB
ALBUMIN SERPL-MCNC: 3.7 G/DL (ref 3.5–5)
ALBUMIN/GLOB SERPL: 0.9 (ref 1.1–2.2)
ALP SERPL-CCNC: 65 U/L (ref 45–117)
ALT SERPL-CCNC: 18 U/L (ref 12–78)
ANION GAP SERPL CALC-SCNC: 8 MMOL/L (ref 2–12)
AST SERPL-CCNC: 11 U/L (ref 15–37)
BASOPHILS # BLD: 0.03 K/UL (ref 0–0.1)
BASOPHILS NFR BLD: 0.3 % (ref 0–1)
BILIRUB SERPL-MCNC: 0.2 MG/DL (ref 0.2–1)
BUN SERPL-MCNC: 12 MG/DL (ref 6–20)
BUN/CREAT SERPL: 16 (ref 12–20)
CALCIUM SERPL-MCNC: 9.2 MG/DL (ref 8.5–10.1)
CHLORIDE SERPL-SCNC: 99 MMOL/L (ref 97–108)
CO2 SERPL-SCNC: 35 MMOL/L (ref 21–32)
CREAT SERPL-MCNC: 0.73 MG/DL (ref 0.7–1.3)
DIFFERENTIAL METHOD BLD: ABNORMAL
EKG ATRIAL RATE: 119 BPM
EKG DIAGNOSIS: NORMAL
EKG P AXIS: 77 DEGREES
EKG P-R INTERVAL: 160 MS
EKG Q-T INTERVAL: 328 MS
EKG QRS DURATION: 88 MS
EKG QTC CALCULATION (BAZETT): 461 MS
EKG R AXIS: 81 DEGREES
EKG T AXIS: 74 DEGREES
EKG VENTRICULAR RATE: 119 BPM
EOSINOPHIL # BLD: 0.27 K/UL (ref 0–0.4)
EOSINOPHIL NFR BLD: 2.5 % (ref 0–7)
ERYTHROCYTE [DISTWIDTH] IN BLOOD BY AUTOMATED COUNT: 15.9 % (ref 11.5–14.5)
GLOBULIN SER CALC-MCNC: 3.9 G/DL (ref 2–4)
GLUCOSE SERPL-MCNC: 143 MG/DL (ref 65–100)
HCT VFR BLD AUTO: 35.9 % (ref 36.6–50.3)
HGB BLD-MCNC: 11.6 G/DL (ref 12.1–17)
IMM GRANULOCYTES # BLD AUTO: 0.02 K/UL (ref 0–0.04)
IMM GRANULOCYTES NFR BLD AUTO: 0.2 % (ref 0–0.5)
LACTATE SERPL-SCNC: 1.5 MMOL/L (ref 0.4–2)
LYMPHOCYTES # BLD: 2.53 K/UL (ref 0.8–3.5)
LYMPHOCYTES NFR BLD: 23.1 % (ref 12–49)
MAGNESIUM SERPL-MCNC: 2.1 MG/DL (ref 1.6–2.4)
MCH RBC QN AUTO: 29.8 PG (ref 26–34)
MCHC RBC AUTO-ENTMCNC: 32.3 G/DL (ref 30–36.5)
MCV RBC AUTO: 92.3 FL (ref 80–99)
MONOCYTES # BLD: 0.91 K/UL (ref 0–1)
MONOCYTES NFR BLD: 8.3 % (ref 5–13)
NEUTS SEG # BLD: 7.19 K/UL (ref 1.8–8)
NEUTS SEG NFR BLD: 65.6 % (ref 32–75)
NRBC # BLD: 0 K/UL (ref 0–0.01)
NRBC BLD-RTO: 0 PER 100 WBC
PLATELET # BLD AUTO: 333 K/UL (ref 150–400)
PMV BLD AUTO: 9.2 FL (ref 8.9–12.9)
POTASSIUM SERPL-SCNC: 3.6 MMOL/L (ref 3.5–5.1)
PROT SERPL-MCNC: 7.6 G/DL (ref 6.4–8.2)
RBC # BLD AUTO: 3.89 M/UL (ref 4.1–5.7)
SODIUM SERPL-SCNC: 142 MMOL/L (ref 136–145)
WBC # BLD AUTO: 11 K/UL (ref 4.1–11.1)

## 2025-06-20 PROCEDURE — 6360000002 HC RX W HCPCS: Performed by: HOSPITALIST

## 2025-06-20 PROCEDURE — 96374 THER/PROPH/DIAG INJ IV PUSH: CPT

## 2025-06-20 PROCEDURE — 6360000002 HC RX W HCPCS: Performed by: FAMILY MEDICINE

## 2025-06-20 PROCEDURE — 2500000003 HC RX 250 WO HCPCS: Performed by: HOSPITALIST

## 2025-06-20 PROCEDURE — 94761 N-INVAS EAR/PLS OXIMETRY MLT: CPT

## 2025-06-20 PROCEDURE — 2500000003 HC RX 250 WO HCPCS: Performed by: FAMILY MEDICINE

## 2025-06-20 PROCEDURE — 2700000000 HC OXYGEN THERAPY PER DAY

## 2025-06-20 PROCEDURE — 83735 ASSAY OF MAGNESIUM: CPT

## 2025-06-20 PROCEDURE — 94640 AIRWAY INHALATION TREATMENT: CPT

## 2025-06-20 PROCEDURE — 99285 EMERGENCY DEPT VISIT HI MDM: CPT

## 2025-06-20 PROCEDURE — 6370000000 HC RX 637 (ALT 250 FOR IP): Performed by: HOSPITALIST

## 2025-06-20 PROCEDURE — 94762 N-INVAS EAR/PLS OXIMTRY CONT: CPT

## 2025-06-20 PROCEDURE — 6370000000 HC RX 637 (ALT 250 FOR IP): Performed by: FAMILY MEDICINE

## 2025-06-20 PROCEDURE — 85025 COMPLETE CBC W/AUTO DIFF WBC: CPT

## 2025-06-20 PROCEDURE — 6370000000 HC RX 637 (ALT 250 FOR IP): Performed by: INTERNAL MEDICINE

## 2025-06-20 PROCEDURE — 96372 THER/PROPH/DIAG INJ SC/IM: CPT

## 2025-06-20 PROCEDURE — 80053 COMPREHEN METABOLIC PANEL: CPT

## 2025-06-20 PROCEDURE — 83605 ASSAY OF LACTIC ACID: CPT

## 2025-06-20 PROCEDURE — 87040 BLOOD CULTURE FOR BACTERIA: CPT

## 2025-06-20 PROCEDURE — G0378 HOSPITAL OBSERVATION PER HR: HCPCS

## 2025-06-20 PROCEDURE — 36415 COLL VENOUS BLD VENIPUNCTURE: CPT

## 2025-06-20 PROCEDURE — 71045 X-RAY EXAM CHEST 1 VIEW: CPT

## 2025-06-20 RX ORDER — PREDNISONE 20 MG/1
40 TABLET ORAL DAILY
Status: DISCONTINUED | OUTPATIENT
Start: 2025-06-20 | End: 2025-06-21 | Stop reason: HOSPADM

## 2025-06-20 RX ORDER — IPRATROPIUM BROMIDE AND ALBUTEROL SULFATE 2.5; .5 MG/3ML; MG/3ML
1 SOLUTION RESPIRATORY (INHALATION) ONCE
Status: COMPLETED | OUTPATIENT
Start: 2025-06-20 | End: 2025-06-20

## 2025-06-20 RX ORDER — FERROUS GLUCONATE 324(37.5)
648 TABLET ORAL
Status: DISCONTINUED | OUTPATIENT
Start: 2025-06-21 | End: 2025-06-21 | Stop reason: HOSPADM

## 2025-06-20 RX ORDER — SODIUM CHLORIDE 0.9 % (FLUSH) 0.9 %
5-40 SYRINGE (ML) INJECTION PRN
Status: DISCONTINUED | OUTPATIENT
Start: 2025-06-20 | End: 2025-06-21 | Stop reason: HOSPADM

## 2025-06-20 RX ORDER — TAMSULOSIN HYDROCHLORIDE 0.4 MG/1
0.4 CAPSULE ORAL DAILY
Status: DISCONTINUED | OUTPATIENT
Start: 2025-06-20 | End: 2025-06-21 | Stop reason: HOSPADM

## 2025-06-20 RX ORDER — IPRATROPIUM BROMIDE AND ALBUTEROL SULFATE 2.5; .5 MG/3ML; MG/3ML
1 SOLUTION RESPIRATORY (INHALATION)
Status: DISCONTINUED | OUTPATIENT
Start: 2025-06-20 | End: 2025-06-20

## 2025-06-20 RX ORDER — ACETAMINOPHEN 650 MG/1
650 SUPPOSITORY RECTAL EVERY 6 HOURS PRN
Status: DISCONTINUED | OUTPATIENT
Start: 2025-06-20 | End: 2025-06-21 | Stop reason: HOSPADM

## 2025-06-20 RX ORDER — ROSUVASTATIN CALCIUM 20 MG/1
20 TABLET, COATED ORAL DAILY
Status: DISCONTINUED | OUTPATIENT
Start: 2025-06-20 | End: 2025-06-21 | Stop reason: HOSPADM

## 2025-06-20 RX ORDER — HYDROCHLOROTHIAZIDE 25 MG/1
25 TABLET ORAL DAILY
Status: DISCONTINUED | OUTPATIENT
Start: 2025-06-20 | End: 2025-06-21 | Stop reason: HOSPADM

## 2025-06-20 RX ORDER — ACETAMINOPHEN 325 MG/1
650 TABLET ORAL EVERY 6 HOURS PRN
Status: DISCONTINUED | OUTPATIENT
Start: 2025-06-20 | End: 2025-06-21 | Stop reason: HOSPADM

## 2025-06-20 RX ORDER — IPRATROPIUM BROMIDE AND ALBUTEROL SULFATE 2.5; .5 MG/3ML; MG/3ML
1 SOLUTION RESPIRATORY (INHALATION)
Status: DISCONTINUED | OUTPATIENT
Start: 2025-06-21 | End: 2025-06-21 | Stop reason: HOSPADM

## 2025-06-20 RX ORDER — PANTOPRAZOLE SODIUM 40 MG/1
40 TABLET, DELAYED RELEASE ORAL DAILY
Status: DISCONTINUED | OUTPATIENT
Start: 2025-06-20 | End: 2025-06-21 | Stop reason: HOSPADM

## 2025-06-20 RX ORDER — POLYETHYLENE GLYCOL 3350 17 G/17G
17 POWDER, FOR SOLUTION ORAL DAILY PRN
Status: DISCONTINUED | OUTPATIENT
Start: 2025-06-20 | End: 2025-06-21 | Stop reason: HOSPADM

## 2025-06-20 RX ORDER — NIFEDIPINE 30 MG/1
90 TABLET, EXTENDED RELEASE ORAL DAILY
Status: DISCONTINUED | OUTPATIENT
Start: 2025-06-20 | End: 2025-06-21 | Stop reason: HOSPADM

## 2025-06-20 RX ORDER — SODIUM CHLORIDE 0.9 % (FLUSH) 0.9 %
5-40 SYRINGE (ML) INJECTION EVERY 12 HOURS SCHEDULED
Status: DISCONTINUED | OUTPATIENT
Start: 2025-06-20 | End: 2025-06-21 | Stop reason: HOSPADM

## 2025-06-20 RX ORDER — AZITHROMYCIN 250 MG/1
500 TABLET, FILM COATED ORAL DAILY
Status: DISCONTINUED | OUTPATIENT
Start: 2025-06-20 | End: 2025-06-21 | Stop reason: HOSPADM

## 2025-06-20 RX ORDER — ENOXAPARIN SODIUM 100 MG/ML
40 INJECTION SUBCUTANEOUS DAILY
Status: DISCONTINUED | OUTPATIENT
Start: 2025-06-20 | End: 2025-06-21 | Stop reason: HOSPADM

## 2025-06-20 RX ORDER — BENZONATATE 100 MG/1
100 CAPSULE ORAL 3 TIMES DAILY PRN
Status: DISCONTINUED | OUTPATIENT
Start: 2025-06-20 | End: 2025-06-21 | Stop reason: HOSPADM

## 2025-06-20 RX ORDER — LOSARTAN POTASSIUM 100 MG/1
100 TABLET ORAL DAILY
Status: DISCONTINUED | OUTPATIENT
Start: 2025-06-20 | End: 2025-06-21 | Stop reason: HOSPADM

## 2025-06-20 RX ORDER — ONDANSETRON 4 MG/1
4 TABLET, ORALLY DISINTEGRATING ORAL EVERY 8 HOURS PRN
Status: DISCONTINUED | OUTPATIENT
Start: 2025-06-20 | End: 2025-06-21 | Stop reason: HOSPADM

## 2025-06-20 RX ORDER — ONDANSETRON 2 MG/ML
4 INJECTION INTRAMUSCULAR; INTRAVENOUS EVERY 6 HOURS PRN
Status: DISCONTINUED | OUTPATIENT
Start: 2025-06-20 | End: 2025-06-21 | Stop reason: HOSPADM

## 2025-06-20 RX ORDER — ALBUTEROL SULFATE 0.83 MG/ML
2.5 SOLUTION RESPIRATORY (INHALATION)
Status: DISCONTINUED | OUTPATIENT
Start: 2025-06-20 | End: 2025-06-20

## 2025-06-20 RX ORDER — SODIUM CHLORIDE 9 MG/ML
INJECTION, SOLUTION INTRAVENOUS PRN
Status: DISCONTINUED | OUTPATIENT
Start: 2025-06-20 | End: 2025-06-21 | Stop reason: HOSPADM

## 2025-06-20 RX ORDER — ALBUTEROL SULFATE 0.83 MG/ML
2.5 SOLUTION RESPIRATORY (INHALATION) EVERY 4 HOURS PRN
Status: DISCONTINUED | OUTPATIENT
Start: 2025-06-20 | End: 2025-06-21

## 2025-06-20 RX ADMIN — ENOXAPARIN SODIUM 40 MG: 100 INJECTION SUBCUTANEOUS at 09:54

## 2025-06-20 RX ADMIN — AZITHROMYCIN DIHYDRATE 500 MG: 250 TABLET ORAL at 09:54

## 2025-06-20 RX ADMIN — NIFEDIPINE 90 MG: 30 TABLET, FILM COATED, EXTENDED RELEASE ORAL at 11:34

## 2025-06-20 RX ADMIN — IPRATROPIUM BROMIDE AND ALBUTEROL SULFATE 1 DOSE: .5; 2.5 SOLUTION RESPIRATORY (INHALATION) at 15:30

## 2025-06-20 RX ADMIN — IPRATROPIUM BROMIDE AND ALBUTEROL SULFATE 1 DOSE: .5; 2.5 SOLUTION RESPIRATORY (INHALATION) at 19:34

## 2025-06-20 RX ADMIN — SODIUM CHLORIDE, PRESERVATIVE FREE 10 ML: 5 INJECTION INTRAVENOUS at 20:59

## 2025-06-20 RX ADMIN — TAMSULOSIN HYDROCHLORIDE 0.4 MG: 0.4 CAPSULE ORAL at 13:24

## 2025-06-20 RX ADMIN — PREDNISONE 40 MG: 20 TABLET ORAL at 09:54

## 2025-06-20 RX ADMIN — IPRATROPIUM BROMIDE AND ALBUTEROL SULFATE 1 DOSE: .5; 2.5 SOLUTION RESPIRATORY (INHALATION) at 07:36

## 2025-06-20 RX ADMIN — PANTOPRAZOLE SODIUM 40 MG: 40 TABLET, DELAYED RELEASE ORAL at 13:24

## 2025-06-20 RX ADMIN — SODIUM CHLORIDE, PRESERVATIVE FREE 10 ML: 5 INJECTION INTRAVENOUS at 09:56

## 2025-06-20 RX ADMIN — LOSARTAN POTASSIUM 100 MG: 100 TABLET, FILM COATED ORAL at 11:34

## 2025-06-20 RX ADMIN — WATER 1000 MG: 1 INJECTION INTRAMUSCULAR; INTRAVENOUS; SUBCUTANEOUS at 05:21

## 2025-06-20 RX ADMIN — ROSUVASTATIN 20 MG: 20 TABLET, FILM COATED ORAL at 13:24

## 2025-06-20 RX ADMIN — IPRATROPIUM BROMIDE AND ALBUTEROL SULFATE 1 DOSE: .5; 2.5 SOLUTION RESPIRATORY (INHALATION) at 12:07

## 2025-06-20 RX ADMIN — HYDROCHLOROTHIAZIDE 25 MG: 25 TABLET ORAL at 13:24

## 2025-06-20 RX ADMIN — IPRATROPIUM BROMIDE AND ALBUTEROL SULFATE 1 DOSE: .5; 2.5 SOLUTION RESPIRATORY (INHALATION) at 04:14

## 2025-06-20 ASSESSMENT — LIFESTYLE VARIABLES
HOW OFTEN DO YOU HAVE A DRINK CONTAINING ALCOHOL: MONTHLY OR LESS
HOW MANY STANDARD DRINKS CONTAINING ALCOHOL DO YOU HAVE ON A TYPICAL DAY: 1 OR 2

## 2025-06-20 ASSESSMENT — PAIN SCALES - GENERAL
PAINLEVEL_OUTOF10: 0
PAINLEVEL_OUTOF10: 0

## 2025-06-20 ASSESSMENT — PAIN - FUNCTIONAL ASSESSMENT: PAIN_FUNCTIONAL_ASSESSMENT: NONE - DENIES PAIN

## 2025-06-20 NOTE — PLAN OF CARE
Problem: Respiratory - Adult  Goal: Achieves optimal ventilation and oxygenation  6/20/2025 1316 by Keisha Espinoza RN  Outcome: Progressing  6/20/2025 1316 by Keisha Espinoza RN  Outcome: Progressing  6/20/2025 1241 by Fabby Orr, RT  Outcome: Progressing     Problem: Safety - Adult  Goal: Free from fall injury  Outcome: Progressing     Problem: Skin/Tissue Integrity  Goal: Skin integrity remains intact  Description: 1.  Monitor for areas of redness and/or skin breakdown  2.  Assess vascular access sites hourly  3.  Every 4-6 hours minimum:  Change oxygen saturation probe site  4.  Every 4-6 hours:  If on nasal continuous positive airway pressure, respiratory therapy assess nares and determine need for appliance change or resting period  Outcome: Progressing     Problem: Pain  Goal: Verbalizes/displays adequate comfort level or baseline comfort level  Outcome: Progressing
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
## 2025-06-20 NOTE — RT PROTOCOL NOTE
RT Inhaler-Nebulizer Bronchodilator Protocol Note    There is a bronchodilator order in the chart from a provider indicating to follow the RT Bronchodilator Protocol and there is an “Initiate RT Inhaler-Nebulizer Bronchodilator Protocol” order as well (see protocol at bottom of note).    CXR Findings:  XR CHEST PORTABLE  Result Date: 6/20/2025  No acute cardiopulmonary process. Electronically signed by Rufus Ocampo      The findings from the last RT Protocol Assessment were as follows:   History Pulmonary Disease: Chronic pulmonary disease  Respiratory Pattern: Regular pattern and RR 12-20 bpm  Breath Sounds: Intermittent or unilateral wheezes  Cough: Strong, spontaneous, non-productive  Indication for Bronchodilator Therapy: On home bronchodilators  Bronchodilator Assessment Score: 6    Aerosolized bronchodilator medication orders have been revised according to the RT Inhaler-Nebulizer Bronchodilator Protocol below.    Respiratory Therapist to perform RT Therapy Protocol Assessment initially then follow the protocol.  Repeat RT Therapy Protocol Assessment PRN for score 0-3 or on second treatment, BID, and PRN for scores above 3.    No Indications - adjust the frequency to every 6 hours PRN wheezing or bronchospasm, if no treatments needed after 48 hours then discontinue using Per Protocol order mode.     If indication present, adjust the RT bronchodilator orders based on the Bronchodilator Assessment Score as indicated below.  Use Inhaler orders unless patient has one or more of the following: on home nebulizer, not able to hold breath for 10 seconds, is not alert and oriented, cannot activate and use MDI correctly, or respiratory rate 25 breaths per minute or more, then use the equivalent nebulizer order(s) with same Frequency and PRN reasons based on the score.  If a patient is on this medication at home then do not decrease Frequency below that used at home.    0-3 - enter or revise RT bronchodilator order(s)  to equivalent RT Bronchodilator order with Frequency of every 4 hours PRN for wheezing or increased work of breathing using Per Protocol order mode.        4-6 - enter or revise RT Bronchodilator order(s) to two equivalent RT bronchodilator orders with one order with BID Frequency and one order with Frequency of every 4 hours PRN wheezing or increased work of breathing using Per Protocol order mode.        7-10 - enter or revise RT Bronchodilator order(s) to two equivalent RT bronchodilator orders with one order with TID Frequency and one order with Frequency of every 4 hours PRN wheezing or increased work of breathing using Per Protocol order mode.       11-13 - enter or revise RT Bronchodilator order(s) to one equivalent RT bronchodilator order with QID Frequency and an Albuterol order with Frequency of every 4 hours PRN wheezing or increased work of breathing using Per Protocol order mode.      Greater than 13 - enter or revise RT Bronchodilator order(s) to one equivalent RT bronchodilator order with every 4 hours Frequency and an Albuterol order with Frequency of every 2 hours PRN wheezing or increased work of breathing using Per Protocol order mode.     RT to enter RT Home Evaluation for COPD & MDI Assessment order using Per Protocol order mode.    Electronically signed by RT Colette on 6/20/2025 at 7:48 PM
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
## 2025-06-20 NOTE — PLAN OF CARE
Problem: Respiratory - Adult  Goal: Achieves optimal ventilation and oxygenation  6/20/2025 1938 by Alaina Osborne, RT  Outcome: Progressing  6/20/2025 1316 by Keisha Espinoza RN  Outcome: Progressing  6/20/2025 1316 by Keisha Espinoza, RN  Outcome: Progressing  6/20/2025 1241 by Fabby Orr, RT  Outcome: Progressing
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
## 2025-06-20 NOTE — PROGRESS NOTES
Spiritual Health History and Assessment/Progress Note  [facility]    Initial Encounter,  ,  ,      Name: Simeon An MRN: [id number]    Age: 65 y.o.     Sex: male   Language: English   Jain: Presybeterian   COPD with acute exacerbation (HCC)     Date: 6/20/2025            Total Time Calculated: 20 min              Spiritual Assessment began in [facility] MED/SURG        Referral/Consult From: Rounding   Encounter Overview/Reason: Initial Encounter  Service Provided For: Patient    Aysha, Belief, Meaning:   Patient identifies as spiritual, is connected with a aysha tradition or spiritual practice, and has beliefs or practices that help with coping during difficult times  Family/Friends No family/friends present      Importance and Influence:  Patient has spiritual/personal beliefs that influence decisions regarding their health  Family/Friends No family/friends present    Community:  Patient is connected with a spiritual community and feels well-supported. Support system includes: Extended family  Family/Friends No family/friends present    Assessment and Plan of Care:     Patient Interventions include: Facilitated expression of thoughts and feelings and Affirmed coping skills/support systems  Family/Friends Interventions include: No family/friends present    Patient Plan of Care: Spiritual Care available upon further referral  Family/Friends Plan of Care: No family/friends present    Electronically signed by Chaplain Spencer on 6/20/2025 at 12:36 PM

## 2025-06-21 ENCOUNTER — HOSPITAL ENCOUNTER (INPATIENT)
Facility: HOSPITAL | Age: 65
LOS: 2 days | Discharge: HOME | DRG: 190 | End: 2025-06-23
Attending: STUDENT IN AN ORGANIZED HEALTH CARE EDUCATION/TRAINING PROGRAM | Admitting: STUDENT IN AN ORGANIZED HEALTH CARE EDUCATION/TRAINING PROGRAM
Payer: MEDICARE

## 2025-06-21 VITALS
SYSTOLIC BLOOD PRESSURE: 120 MMHG | RESPIRATION RATE: 20 BRPM | OXYGEN SATURATION: 100 % | BODY MASS INDEX: 19.7 KG/M2 | HEIGHT: 69 IN | DIASTOLIC BLOOD PRESSURE: 71 MMHG | TEMPERATURE: 98.1 F | WEIGHT: 133 LBS | HEART RATE: 103 BPM

## 2025-06-21 LAB
ANION GAP SERPL CALC-SCNC: 6 MMOL/L (ref 2–12)
BASOPHILS # BLD: 0.01 K/UL (ref 0–0.1)
BASOPHILS NFR BLD: 0.1 % (ref 0–1)
BUN SERPL-MCNC: 16 MG/DL (ref 6–20)
BUN/CREAT SERPL: 20 (ref 12–20)
CALCIUM SERPL-MCNC: 8.9 MG/DL (ref 8.5–10.1)
CHLORIDE SERPL-SCNC: 103 MMOL/L (ref 97–108)
CO2 SERPL-SCNC: 34 MMOL/L (ref 21–32)
CREAT SERPL-MCNC: 0.8 MG/DL (ref 0.7–1.3)
DIFFERENTIAL METHOD BLD: ABNORMAL
EOSINOPHIL # BLD: 0.06 K/UL (ref 0–0.4)
EOSINOPHIL NFR BLD: 0.8 % (ref 0–7)
ERYTHROCYTE [DISTWIDTH] IN BLOOD BY AUTOMATED COUNT: 15.5 % (ref 11.5–14.5)
GLUCOSE SERPL-MCNC: 113 MG/DL (ref 65–100)
HCT VFR BLD AUTO: 29.1 % (ref 36.6–50.3)
HGB BLD-MCNC: 9.5 G/DL (ref 12.1–17)
IMM GRANULOCYTES # BLD AUTO: 0.02 K/UL (ref 0–0.04)
IMM GRANULOCYTES NFR BLD AUTO: 0.3 % (ref 0–0.5)
LYMPHOCYTES # BLD: 1.45 K/UL (ref 0.8–3.5)
LYMPHOCYTES NFR BLD: 19 % (ref 12–49)
MAGNESIUM SERPL-MCNC: 1.8 MG/DL (ref 1.6–2.4)
MCH RBC QN AUTO: 29.6 PG (ref 26–34)
MCHC RBC AUTO-ENTMCNC: 32.6 G/DL (ref 30–36.5)
MCV RBC AUTO: 90.7 FL (ref 80–99)
MONOCYTES # BLD: 0.81 K/UL (ref 0–1)
MONOCYTES NFR BLD: 10.6 % (ref 5–13)
NEUTS SEG # BLD: 5.3 K/UL (ref 1.8–8)
NEUTS SEG NFR BLD: 69.2 % (ref 32–75)
NRBC # BLD: 0 K/UL (ref 0–0.01)
NRBC BLD-RTO: 0 PER 100 WBC
PLATELET # BLD AUTO: 299 K/UL (ref 150–400)
PMV BLD AUTO: 9.3 FL (ref 8.9–12.9)
POTASSIUM SERPL-SCNC: 3.3 MMOL/L (ref 3.5–5.1)
RBC # BLD AUTO: 3.21 M/UL (ref 4.1–5.7)
SODIUM SERPL-SCNC: 143 MMOL/L (ref 136–145)
WBC # BLD AUTO: 7.7 K/UL (ref 4.1–11.1)

## 2025-06-21 PROCEDURE — G0378 HOSPITAL OBSERVATION PER HR: HCPCS

## 2025-06-21 PROCEDURE — 6370000000 HC RX 637 (ALT 250 FOR IP): Performed by: HOSPITALIST

## 2025-06-21 PROCEDURE — G0379 DIRECT REFER HOSPITAL OBSERV: HCPCS

## 2025-06-21 PROCEDURE — 2700000000 HC OXYGEN THERAPY PER DAY

## 2025-06-21 PROCEDURE — 80048 BASIC METABOLIC PNL TOTAL CA: CPT

## 2025-06-21 PROCEDURE — 94640 AIRWAY INHALATION TREATMENT: CPT

## 2025-06-21 PROCEDURE — 2580000003 HC RX 258: Performed by: STUDENT IN AN ORGANIZED HEALTH CARE EDUCATION/TRAINING PROGRAM

## 2025-06-21 PROCEDURE — 85025 COMPLETE CBC W/AUTO DIFF WBC: CPT

## 2025-06-21 PROCEDURE — 6370000000 HC RX 637 (ALT 250 FOR IP): Performed by: INTERNAL MEDICINE

## 2025-06-21 PROCEDURE — 6360000002 HC RX W HCPCS: Performed by: HOSPITALIST

## 2025-06-21 PROCEDURE — 94761 N-INVAS EAR/PLS OXIMETRY MLT: CPT

## 2025-06-21 PROCEDURE — 96372 THER/PROPH/DIAG INJ SC/IM: CPT

## 2025-06-21 PROCEDURE — 36415 COLL VENOUS BLD VENIPUNCTURE: CPT

## 2025-06-21 PROCEDURE — 6370000000 HC RX 637 (ALT 250 FOR IP): Performed by: STUDENT IN AN ORGANIZED HEALTH CARE EDUCATION/TRAINING PROGRAM

## 2025-06-21 PROCEDURE — 1100000000 HC RM PRIVATE

## 2025-06-21 PROCEDURE — 83735 ASSAY OF MAGNESIUM: CPT

## 2025-06-21 PROCEDURE — 6360000002 HC RX W HCPCS: Performed by: STUDENT IN AN ORGANIZED HEALTH CARE EDUCATION/TRAINING PROGRAM

## 2025-06-21 PROCEDURE — 6360000002 HC RX W HCPCS: Performed by: INTERNAL MEDICINE

## 2025-06-21 RX ORDER — IPRATROPIUM BROMIDE AND ALBUTEROL SULFATE 2.5; .5 MG/3ML; MG/3ML
1 SOLUTION RESPIRATORY (INHALATION) EVERY 4 HOURS PRN
Status: DISCONTINUED | OUTPATIENT
Start: 2025-06-21 | End: 2025-06-23 | Stop reason: HOSPADM

## 2025-06-21 RX ORDER — ROSUVASTATIN CALCIUM 10 MG/1
20 TABLET, COATED ORAL DAILY
Status: DISCONTINUED | OUTPATIENT
Start: 2025-06-22 | End: 2025-06-23 | Stop reason: HOSPADM

## 2025-06-21 RX ORDER — ONDANSETRON 2 MG/ML
4 INJECTION INTRAMUSCULAR; INTRAVENOUS EVERY 6 HOURS PRN
Status: DISCONTINUED | OUTPATIENT
Start: 2025-06-21 | End: 2025-06-23 | Stop reason: HOSPADM

## 2025-06-21 RX ORDER — ARFORMOTEROL TARTRATE 15 UG/2ML
15 SOLUTION RESPIRATORY (INHALATION)
Status: DISCONTINUED | OUTPATIENT
Start: 2025-06-21 | End: 2025-06-23 | Stop reason: HOSPADM

## 2025-06-21 RX ORDER — SODIUM CHLORIDE FOR INHALATION 3 %
4 VIAL, NEBULIZER (ML) INHALATION PRN
Status: DISCONTINUED | OUTPATIENT
Start: 2025-06-21 | End: 2025-06-23 | Stop reason: HOSPADM

## 2025-06-21 RX ORDER — ONDANSETRON 4 MG/1
4 TABLET, ORALLY DISINTEGRATING ORAL EVERY 8 HOURS PRN
Status: DISCONTINUED | OUTPATIENT
Start: 2025-06-21 | End: 2025-06-23 | Stop reason: HOSPADM

## 2025-06-21 RX ORDER — ENOXAPARIN SODIUM 100 MG/ML
40 INJECTION SUBCUTANEOUS DAILY
Status: DISCONTINUED | OUTPATIENT
Start: 2025-06-21 | End: 2025-06-21

## 2025-06-21 RX ORDER — BUDESONIDE 0.5 MG/2ML
0.5 INHALANT ORAL
Status: DISCONTINUED | OUTPATIENT
Start: 2025-06-21 | End: 2025-06-23 | Stop reason: HOSPADM

## 2025-06-21 RX ORDER — BENZONATATE 100 MG/1
100 CAPSULE ORAL 3 TIMES DAILY PRN
Status: DISCONTINUED | OUTPATIENT
Start: 2025-06-21 | End: 2025-06-23 | Stop reason: HOSPADM

## 2025-06-21 RX ORDER — POTASSIUM CHLORIDE 1500 MG/1
40 TABLET, EXTENDED RELEASE ORAL PRN
Status: DISCONTINUED | OUTPATIENT
Start: 2025-06-21 | End: 2025-06-23 | Stop reason: HOSPADM

## 2025-06-21 RX ORDER — SODIUM CHLORIDE 9 MG/ML
INJECTION, SOLUTION INTRAVENOUS PRN
Status: DISCONTINUED | OUTPATIENT
Start: 2025-06-21 | End: 2025-06-23 | Stop reason: HOSPADM

## 2025-06-21 RX ORDER — PREDNISONE 20 MG/1
40 TABLET ORAL DAILY
Status: DISCONTINUED | OUTPATIENT
Start: 2025-06-22 | End: 2025-06-23 | Stop reason: HOSPADM

## 2025-06-21 RX ORDER — LOSARTAN POTASSIUM 100 MG/1
100 TABLET ORAL DAILY
Status: DISCONTINUED | OUTPATIENT
Start: 2025-06-22 | End: 2025-06-23 | Stop reason: HOSPADM

## 2025-06-21 RX ORDER — IPRATROPIUM BROMIDE AND ALBUTEROL SULFATE 2.5; .5 MG/3ML; MG/3ML
1 SOLUTION RESPIRATORY (INHALATION)
Status: DISCONTINUED | OUTPATIENT
Start: 2025-06-21 | End: 2025-06-23 | Stop reason: HOSPADM

## 2025-06-21 RX ORDER — POLYETHYLENE GLYCOL 3350 17 G/17G
17 POWDER, FOR SOLUTION ORAL DAILY PRN
Status: DISCONTINUED | OUTPATIENT
Start: 2025-06-21 | End: 2025-06-23 | Stop reason: HOSPADM

## 2025-06-21 RX ORDER — IPRATROPIUM BROMIDE AND ALBUTEROL SULFATE 2.5; .5 MG/3ML; MG/3ML
1 SOLUTION RESPIRATORY (INHALATION) EVERY 4 HOURS PRN
Status: DISCONTINUED | OUTPATIENT
Start: 2025-06-21 | End: 2025-06-21 | Stop reason: HOSPADM

## 2025-06-21 RX ORDER — GUAIFENESIN 600 MG/1
600 TABLET, EXTENDED RELEASE ORAL 2 TIMES DAILY
Status: DISCONTINUED | OUTPATIENT
Start: 2025-06-21 | End: 2025-06-21 | Stop reason: HOSPADM

## 2025-06-21 RX ORDER — HYDROCHLOROTHIAZIDE 25 MG/1
25 TABLET ORAL DAILY
Status: DISCONTINUED | OUTPATIENT
Start: 2025-06-22 | End: 2025-06-23 | Stop reason: HOSPADM

## 2025-06-21 RX ORDER — POTASSIUM CHLORIDE 750 MG/1
40 TABLET, EXTENDED RELEASE ORAL ONCE
Status: COMPLETED | OUTPATIENT
Start: 2025-06-21 | End: 2025-06-21

## 2025-06-21 RX ORDER — FERROUS SULFATE 325(65) MG
325 TABLET ORAL
Status: DISCONTINUED | OUTPATIENT
Start: 2025-06-22 | End: 2025-06-23 | Stop reason: HOSPADM

## 2025-06-21 RX ORDER — ACETAMINOPHEN 325 MG/1
650 TABLET ORAL EVERY 6 HOURS PRN
Status: DISCONTINUED | OUTPATIENT
Start: 2025-06-21 | End: 2025-06-23 | Stop reason: HOSPADM

## 2025-06-21 RX ORDER — PANTOPRAZOLE SODIUM 40 MG/1
40 TABLET, DELAYED RELEASE ORAL DAILY
Status: DISCONTINUED | OUTPATIENT
Start: 2025-06-22 | End: 2025-06-23 | Stop reason: HOSPADM

## 2025-06-21 RX ORDER — ALBUTEROL SULFATE 0.83 MG/ML
2.5 SOLUTION RESPIRATORY (INHALATION) EVERY 6 HOURS
Status: DISCONTINUED | OUTPATIENT
Start: 2025-06-21 | End: 2025-06-21

## 2025-06-21 RX ORDER — SODIUM CHLORIDE FOR INHALATION 3 %
4 VIAL, NEBULIZER (ML) INHALATION
Status: COMPLETED | OUTPATIENT
Start: 2025-06-21 | End: 2025-06-21

## 2025-06-21 RX ORDER — AZITHROMYCIN 250 MG/1
250 TABLET, FILM COATED ORAL DAILY
Status: DISCONTINUED | OUTPATIENT
Start: 2025-06-22 | End: 2025-06-23 | Stop reason: HOSPADM

## 2025-06-21 RX ORDER — AZITHROMYCIN 250 MG/1
500 TABLET, FILM COATED ORAL DAILY
Status: DISCONTINUED | OUTPATIENT
Start: 2025-06-22 | End: 2025-06-21

## 2025-06-21 RX ORDER — MONTELUKAST SODIUM 10 MG/1
10 TABLET ORAL NIGHTLY
Status: DISCONTINUED | OUTPATIENT
Start: 2025-06-21 | End: 2025-06-23 | Stop reason: HOSPADM

## 2025-06-21 RX ORDER — SODIUM CHLORIDE 0.9 % (FLUSH) 0.9 %
5-40 SYRINGE (ML) INJECTION PRN
Status: DISCONTINUED | OUTPATIENT
Start: 2025-06-21 | End: 2025-06-23 | Stop reason: HOSPADM

## 2025-06-21 RX ORDER — NIFEDIPINE 30 MG/1
90 TABLET, EXTENDED RELEASE ORAL DAILY
Status: DISCONTINUED | OUTPATIENT
Start: 2025-06-22 | End: 2025-06-23 | Stop reason: HOSPADM

## 2025-06-21 RX ORDER — FLUTICASONE PROPIONATE 50 MCG
2 SPRAY, SUSPENSION (ML) NASAL DAILY
Status: DISCONTINUED | OUTPATIENT
Start: 2025-06-22 | End: 2025-06-23 | Stop reason: HOSPADM

## 2025-06-21 RX ORDER — GUAIFENESIN 600 MG/1
600 TABLET, EXTENDED RELEASE ORAL 2 TIMES DAILY
Status: DISCONTINUED | OUTPATIENT
Start: 2025-06-21 | End: 2025-06-22

## 2025-06-21 RX ORDER — TAMSULOSIN HYDROCHLORIDE 0.4 MG/1
0.4 CAPSULE ORAL DAILY
Status: DISCONTINUED | OUTPATIENT
Start: 2025-06-22 | End: 2025-06-23 | Stop reason: HOSPADM

## 2025-06-21 RX ORDER — MAGNESIUM SULFATE IN WATER 40 MG/ML
2000 INJECTION, SOLUTION INTRAVENOUS PRN
Status: DISCONTINUED | OUTPATIENT
Start: 2025-06-21 | End: 2025-06-23 | Stop reason: HOSPADM

## 2025-06-21 RX ORDER — ENOXAPARIN SODIUM 100 MG/ML
40 INJECTION SUBCUTANEOUS DAILY
Status: DISCONTINUED | OUTPATIENT
Start: 2025-06-22 | End: 2025-06-23 | Stop reason: HOSPADM

## 2025-06-21 RX ORDER — POTASSIUM CHLORIDE 7.45 MG/ML
10 INJECTION INTRAVENOUS PRN
Status: DISCONTINUED | OUTPATIENT
Start: 2025-06-21 | End: 2025-06-23 | Stop reason: HOSPADM

## 2025-06-21 RX ORDER — ACETAMINOPHEN 650 MG/1
650 SUPPOSITORY RECTAL EVERY 6 HOURS PRN
Status: DISCONTINUED | OUTPATIENT
Start: 2025-06-21 | End: 2025-06-23 | Stop reason: HOSPADM

## 2025-06-21 RX ORDER — ACETYLCYSTEINE 200 MG/ML
600 SOLUTION ORAL; RESPIRATORY (INHALATION)
Status: DISCONTINUED | OUTPATIENT
Start: 2025-06-21 | End: 2025-06-21 | Stop reason: HOSPADM

## 2025-06-21 RX ORDER — SODIUM CHLORIDE 0.9 % (FLUSH) 0.9 %
5-40 SYRINGE (ML) INJECTION EVERY 12 HOURS SCHEDULED
Status: DISCONTINUED | OUTPATIENT
Start: 2025-06-21 | End: 2025-06-23 | Stop reason: HOSPADM

## 2025-06-21 RX ADMIN — IPRATROPIUM BROMIDE AND ALBUTEROL SULFATE 1 DOSE: .5; 2.5 SOLUTION RESPIRATORY (INHALATION) at 08:56

## 2025-06-21 RX ADMIN — ENOXAPARIN SODIUM 40 MG: 100 INJECTION SUBCUTANEOUS at 10:45

## 2025-06-21 RX ADMIN — TAMSULOSIN HYDROCHLORIDE 0.4 MG: 0.4 CAPSULE ORAL at 10:45

## 2025-06-21 RX ADMIN — Medication 4 ML: at 20:39

## 2025-06-21 RX ADMIN — POTASSIUM CHLORIDE 40 MEQ: 750 TABLET, FILM COATED, EXTENDED RELEASE ORAL at 10:50

## 2025-06-21 RX ADMIN — Medication 648 MG: at 10:50

## 2025-06-21 RX ADMIN — ALBUTEROL SULFATE 2.5 MG: 2.5 SOLUTION RESPIRATORY (INHALATION) at 04:18

## 2025-06-21 RX ADMIN — GUAIFENESIN 600 MG: 600 TABLET ORAL at 10:45

## 2025-06-21 RX ADMIN — PREDNISONE 40 MG: 20 TABLET ORAL at 10:45

## 2025-06-21 RX ADMIN — BUDESONIDE 500 MCG: 0.5 INHALANT RESPIRATORY (INHALATION) at 20:39

## 2025-06-21 RX ADMIN — IPRATROPIUM BROMIDE AND ALBUTEROL SULFATE 1 DOSE: .5; 2.5 SOLUTION RESPIRATORY (INHALATION) at 07:04

## 2025-06-21 RX ADMIN — IPRATROPIUM BROMIDE AND ALBUTEROL SULFATE 1 DOSE: .5; 2.5 SOLUTION RESPIRATORY (INHALATION) at 13:48

## 2025-06-21 RX ADMIN — ARFORMOTEROL TARTRATE 15 MCG: 15 SOLUTION RESPIRATORY (INHALATION) at 20:39

## 2025-06-21 RX ADMIN — NIFEDIPINE 90 MG: 30 TABLET, FILM COATED, EXTENDED RELEASE ORAL at 10:45

## 2025-06-21 RX ADMIN — AZITHROMYCIN DIHYDRATE 500 MG: 250 TABLET ORAL at 10:45

## 2025-06-21 RX ADMIN — IPRATROPIUM BROMIDE AND ALBUTEROL SULFATE 1 DOSE: .5; 3 SOLUTION RESPIRATORY (INHALATION) at 20:38

## 2025-06-21 RX ADMIN — ROSUVASTATIN 20 MG: 20 TABLET, FILM COATED ORAL at 10:45

## 2025-06-21 RX ADMIN — ACETYLCYSTEINE 600 MG: 200 INHALANT RESPIRATORY (INHALATION) at 08:56

## 2025-06-21 RX ADMIN — LOSARTAN POTASSIUM 100 MG: 100 TABLET, FILM COATED ORAL at 10:45

## 2025-06-21 RX ADMIN — HYDROCHLOROTHIAZIDE 25 MG: 25 TABLET ORAL at 10:45

## 2025-06-21 RX ADMIN — PANTOPRAZOLE SODIUM 40 MG: 40 TABLET, DELAYED RELEASE ORAL at 10:45

## 2025-06-21 NOTE — PLAN OF CARE
Problem: Respiratory - Adult  Goal: Achieves optimal ventilation and oxygenation  6/20/2025 2332 by Jaime Rodriguez RN  Outcome: Progressing  6/20/2025 1938 by Alaina Osborne, RT  Outcome: Progressing  6/20/2025 1316 by Keisha Espinoza RN  Outcome: Progressing  6/20/2025 1316 by Keisha Espinoza RN  Outcome: Progressing  6/20/2025 1241 by Fabby Orr, RT  Outcome: Progressing     Problem: Safety - Adult  Goal: Free from fall injury  6/20/2025 2332 by Jaime Rodriguez RN  Outcome: Progressing  6/20/2025 1317 by Keisha Espinoza RN  Outcome: Progressing     Problem: Skin/Tissue Integrity  Goal: Skin integrity remains intact  Description: 1.  Monitor for areas of redness and/or skin breakdown  2.  Assess vascular access sites hourly  3.  Every 4-6 hours minimum:  Change oxygen saturation probe site  4.  Every 4-6 hours:  If on nasal continuous positive airway pressure, respiratory therapy assess nares and determine need for appliance change or resting period  6/20/2025 2332 by Jaime Rodriguez RN  Outcome: Progressing  6/20/2025 1317 by Keisha Espinoza RN  Outcome: Progressing     Problem: Pain  Goal: Verbalizes/displays adequate comfort level or baseline comfort level  6/20/2025 2332 by Jaime Rodriguez RN  Outcome: Progressing  6/20/2025 1317 by Keisha Espinoza RN  Outcome: Progressing

## 2025-06-21 NOTE — PROGRESS NOTES
[facility] called, spoke with Jimena.   Bed assigned [facility] Room 3236.   Report number: [phone]    Charge/Primary nurse aware  MD aware  Supervisor Aware

## 2025-06-21 NOTE — PLAN OF CARE
Problem: Respiratory - Adult  Goal: Achieves optimal ventilation and oxygenation  6/21/2025 1350 by Skyla Weaver, RN  Outcome: Progressing  Flowsheets (Taken 6/21/2025 0800)  Achieves optimal ventilation and oxygenation:   Assess for changes in respiratory status   Respiratory therapy support as indicated   Oxygen supplementation based on oxygen saturation or arterial blood gases  6/21/2025 0708 by Frankie Paredes Sr., RCP  Outcome: Progressing     Problem: Safety - Adult  Goal: Free from fall injury  Outcome: Progressing     Problem: Skin/Tissue Integrity  Goal: Skin integrity remains intact  Description: 1.  Monitor for areas of redness and/or skin breakdown  2.  Assess vascular access sites hourly  3.  Every 4-6 hours minimum:  Change oxygen saturation probe site  4.  Every 4-6 hours:  If on nasal continuous positive airway pressure, respiratory therapy assess nares and determine need for appliance change or resting period  Outcome: Progressing  Flowsheets (Taken 6/21/2025 0800)  Skin Integrity Remains Intact: Monitor for areas of redness and/or skin breakdown     Problem: Pain  Goal: Verbalizes/displays adequate comfort level or baseline comfort level  Outcome: Progressing     Problem: Respiratory - Adult  Goal: Achieves optimal ventilation and oxygenation  6/21/2025 1350 by Skyla Weaver, RN  Outcome: Progressing  Flowsheets (Taken 6/21/2025 0800)  Achieves optimal ventilation and oxygenation:   Assess for changes in respiratory status   Respiratory therapy support as indicated   Oxygen supplementation based on oxygen saturation or arterial blood gases  6/21/2025 0708 by Frankie Paredes Sr., RCP  Outcome: Progressing     Problem: Safety - Adult  Goal: Free from fall injury  Outcome: Progressing     Problem: Skin/Tissue Integrity  Goal: Skin integrity remains intact  Description: 1.  Monitor for areas of redness and/or skin breakdown  2.  Assess vascular access sites hourly  3.  Every 4-6 hours minimum:

## 2025-06-21 NOTE — PROGRESS NOTES
1049-- Writer contacted [facility] and spoke with Beverly to arrange ALS transport for this patient to [facility] Bed # 3236. Requested information provided (Pt diagnosis, weight, demographics, cardiac monitoring, O2 on at 2 liters via nasal cannula, saline lock, no isolation precautions and insurance information given) ETA 1115-- ZINA Weaver RN made aware.     1115-- Updated [facility] ETA is 1530-- ZINA Weaver RN made aware.
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
## 2025-06-22 ENCOUNTER — APPOINTMENT (OUTPATIENT)
Facility: HOSPITAL | Age: 65
DRG: 190 | End: 2025-06-22
Attending: STUDENT IN AN ORGANIZED HEALTH CARE EDUCATION/TRAINING PROGRAM
Payer: MEDICARE

## 2025-06-22 LAB
ANION GAP SERPL CALC-SCNC: 3 MMOL/L (ref 2–12)
B PERT DNA SPEC QL NAA+PROBE: NOT DETECTED
BACTERIA SPEC CULT: NORMAL
BACTERIA SPEC CULT: NORMAL
BASOPHILS # BLD: 0.01 K/UL (ref 0–0.1)
BASOPHILS NFR BLD: 0.1 % (ref 0–1)
BORDETELLA PARAPERTUSSIS BY PCR: NOT DETECTED
BUN SERPL-MCNC: 17 MG/DL (ref 6–20)
BUN/CREAT SERPL: 21 (ref 12–20)
C PNEUM DNA SPEC QL NAA+PROBE: NOT DETECTED
CALCIUM SERPL-MCNC: 9.1 MG/DL (ref 8.5–10.1)
CHLORIDE SERPL-SCNC: 105 MMOL/L (ref 97–108)
CO2 SERPL-SCNC: 32 MMOL/L (ref 21–32)
CREAT SERPL-MCNC: 0.81 MG/DL (ref 0.7–1.3)
DIFFERENTIAL METHOD BLD: ABNORMAL
EOSINOPHIL # BLD: 0.04 K/UL (ref 0–0.4)
EOSINOPHIL NFR BLD: 0.5 % (ref 0–7)
ERYTHROCYTE [DISTWIDTH] IN BLOOD BY AUTOMATED COUNT: 15.7 % (ref 11.5–14.5)
FLUAV SUBTYP SPEC NAA+PROBE: NOT DETECTED
FLUBV RNA SPEC QL NAA+PROBE: NOT DETECTED
GLUCOSE SERPL-MCNC: 145 MG/DL (ref 65–100)
HADV DNA SPEC QL NAA+PROBE: NOT DETECTED
HCOV 229E RNA SPEC QL NAA+PROBE: NOT DETECTED
HCOV HKU1 RNA SPEC QL NAA+PROBE: NOT DETECTED
HCOV NL63 RNA SPEC QL NAA+PROBE: NOT DETECTED
HCOV OC43 RNA SPEC QL NAA+PROBE: NOT DETECTED
HCT VFR BLD AUTO: 30.7 % (ref 36.6–50.3)
HGB BLD-MCNC: 9.8 G/DL (ref 12.1–17)
HMPV RNA SPEC QL NAA+PROBE: NOT DETECTED
HPIV1 RNA SPEC QL NAA+PROBE: NOT DETECTED
HPIV2 RNA SPEC QL NAA+PROBE: NOT DETECTED
HPIV3 RNA SPEC QL NAA+PROBE: NOT DETECTED
HPIV4 RNA SPEC QL NAA+PROBE: NOT DETECTED
IMM GRANULOCYTES # BLD AUTO: 0.03 K/UL (ref 0–0.04)
IMM GRANULOCYTES NFR BLD AUTO: 0.4 % (ref 0–0.5)
LYMPHOCYTES # BLD: 1.71 K/UL (ref 0.8–3.5)
LYMPHOCYTES NFR BLD: 21 % (ref 12–49)
M PNEUMO DNA SPEC QL NAA+PROBE: NOT DETECTED
MCH RBC QN AUTO: 29.2 PG (ref 26–34)
MCHC RBC AUTO-ENTMCNC: 31.9 G/DL (ref 30–36.5)
MCV RBC AUTO: 91.4 FL (ref 80–99)
MONOCYTES # BLD: 0.72 K/UL (ref 0–1)
MONOCYTES NFR BLD: 8.8 % (ref 5–13)
NEUTS SEG # BLD: 5.65 K/UL (ref 1.8–8)
NEUTS SEG NFR BLD: 69.2 % (ref 32–75)
NRBC # BLD: 0 K/UL (ref 0–0.01)
NRBC BLD-RTO: 0 PER 100 WBC
PLATELET # BLD AUTO: 269 K/UL (ref 150–400)
PMV BLD AUTO: 9.1 FL (ref 8.9–12.9)
POTASSIUM SERPL-SCNC: 3.7 MMOL/L (ref 3.5–5.1)
RBC # BLD AUTO: 3.36 M/UL (ref 4.1–5.7)
RSV RNA SPEC QL NAA+PROBE: NOT DETECTED
RV+EV RNA SPEC QL NAA+PROBE: NOT DETECTED
SARS-COV-2 RNA RESP QL NAA+PROBE: NOT DETECTED
SERVICE CMNT-IMP: NORMAL
SERVICE CMNT-IMP: NORMAL
SODIUM SERPL-SCNC: 140 MMOL/L (ref 136–145)
WBC # BLD AUTO: 8.2 K/UL (ref 4.1–11.1)

## 2025-06-22 PROCEDURE — 71275 CT ANGIOGRAPHY CHEST: CPT

## 2025-06-22 PROCEDURE — 6370000000 HC RX 637 (ALT 250 FOR IP): Performed by: STUDENT IN AN ORGANIZED HEALTH CARE EDUCATION/TRAINING PROGRAM

## 2025-06-22 PROCEDURE — 80048 BASIC METABOLIC PNL TOTAL CA: CPT

## 2025-06-22 PROCEDURE — 1100000000 HC RM PRIVATE

## 2025-06-22 PROCEDURE — 6370000000 HC RX 637 (ALT 250 FOR IP)

## 2025-06-22 PROCEDURE — 94761 N-INVAS EAR/PLS OXIMETRY MLT: CPT

## 2025-06-22 PROCEDURE — 36415 COLL VENOUS BLD VENIPUNCTURE: CPT

## 2025-06-22 PROCEDURE — 94669 MECHANICAL CHEST WALL OSCILL: CPT

## 2025-06-22 PROCEDURE — 85025 COMPLETE CBC W/AUTO DIFF WBC: CPT

## 2025-06-22 PROCEDURE — 6360000004 HC RX CONTRAST MEDICATION: Performed by: INTERNAL MEDICINE

## 2025-06-22 PROCEDURE — 6360000002 HC RX W HCPCS: Performed by: STUDENT IN AN ORGANIZED HEALTH CARE EDUCATION/TRAINING PROGRAM

## 2025-06-22 PROCEDURE — 2700000000 HC OXYGEN THERAPY PER DAY

## 2025-06-22 PROCEDURE — 0202U NFCT DS 22 TRGT SARS-COV-2: CPT

## 2025-06-22 PROCEDURE — 2500000003 HC RX 250 WO HCPCS: Performed by: STUDENT IN AN ORGANIZED HEALTH CARE EDUCATION/TRAINING PROGRAM

## 2025-06-22 PROCEDURE — 94640 AIRWAY INHALATION TREATMENT: CPT

## 2025-06-22 RX ORDER — GUAIFENESIN 600 MG/1
1200 TABLET, EXTENDED RELEASE ORAL 2 TIMES DAILY
Status: DISCONTINUED | OUTPATIENT
Start: 2025-06-22 | End: 2025-06-23 | Stop reason: HOSPADM

## 2025-06-22 RX ORDER — IOPAMIDOL 755 MG/ML
100 INJECTION, SOLUTION INTRAVASCULAR
Status: COMPLETED | OUTPATIENT
Start: 2025-06-22 | End: 2025-06-22

## 2025-06-22 RX ADMIN — GUAIFENESIN 1200 MG: 600 TABLET, EXTENDED RELEASE ORAL at 21:46

## 2025-06-22 RX ADMIN — MONTELUKAST 10 MG: 10 TABLET, FILM COATED ORAL at 21:46

## 2025-06-22 RX ADMIN — SODIUM CHLORIDE, PRESERVATIVE FREE 10 ML: 5 INJECTION INTRAVENOUS at 21:46

## 2025-06-22 RX ADMIN — BUDESONIDE 500 MCG: 0.5 INHALANT RESPIRATORY (INHALATION) at 20:05

## 2025-06-22 RX ADMIN — TAMSULOSIN HYDROCHLORIDE 0.4 MG: 0.4 CAPSULE ORAL at 11:51

## 2025-06-22 RX ADMIN — ARFORMOTEROL TARTRATE 15 MCG: 15 SOLUTION RESPIRATORY (INHALATION) at 07:31

## 2025-06-22 RX ADMIN — IPRATROPIUM BROMIDE AND ALBUTEROL SULFATE 1 DOSE: .5; 3 SOLUTION RESPIRATORY (INHALATION) at 20:05

## 2025-06-22 RX ADMIN — SODIUM CHLORIDE, PRESERVATIVE FREE 10 ML: 5 INJECTION INTRAVENOUS at 03:06

## 2025-06-22 RX ADMIN — LOSARTAN POTASSIUM 100 MG: 100 TABLET, FILM COATED ORAL at 11:50

## 2025-06-22 RX ADMIN — IPRATROPIUM BROMIDE AND ALBUTEROL SULFATE 1 DOSE: .5; 3 SOLUTION RESPIRATORY (INHALATION) at 11:15

## 2025-06-22 RX ADMIN — ROSUVASTATIN CALCIUM 20 MG: 10 TABLET, FILM COATED ORAL at 11:52

## 2025-06-22 RX ADMIN — ENOXAPARIN SODIUM 40 MG: 100 INJECTION SUBCUTANEOUS at 11:56

## 2025-06-22 RX ADMIN — HYDROCHLOROTHIAZIDE 25 MG: 25 TABLET ORAL at 11:51

## 2025-06-22 RX ADMIN — IPRATROPIUM BROMIDE AND ALBUTEROL SULFATE 1 DOSE: .5; 3 SOLUTION RESPIRATORY (INHALATION) at 07:22

## 2025-06-22 RX ADMIN — GUAIFENESIN 600 MG: 600 TABLET, EXTENDED RELEASE ORAL at 03:09

## 2025-06-22 RX ADMIN — SODIUM CHLORIDE, PRESERVATIVE FREE 10 ML: 5 INJECTION INTRAVENOUS at 08:15

## 2025-06-22 RX ADMIN — PREDNISONE 40 MG: 20 TABLET ORAL at 11:52

## 2025-06-22 RX ADMIN — MONTELUKAST 10 MG: 10 TABLET, FILM COATED ORAL at 03:09

## 2025-06-22 RX ADMIN — ARFORMOTEROL TARTRATE 15 MCG: 15 SOLUTION RESPIRATORY (INHALATION) at 20:05

## 2025-06-22 RX ADMIN — AZITHROMYCIN 250 MG: 250 TABLET, FILM COATED ORAL at 11:51

## 2025-06-22 RX ADMIN — FERROUS SULFATE TAB 325 MG (65 MG ELEMENTAL FE) 325 MG: 325 (65 FE) TAB at 11:52

## 2025-06-22 RX ADMIN — IOPAMIDOL 100 ML: 755 INJECTION, SOLUTION INTRAVENOUS at 08:53

## 2025-06-22 RX ADMIN — IPRATROPIUM BROMIDE AND ALBUTEROL SULFATE 1 DOSE: .5; 3 SOLUTION RESPIRATORY (INHALATION) at 15:23

## 2025-06-22 RX ADMIN — NIFEDIPINE 90 MG: 30 TABLET, FILM COATED, EXTENDED RELEASE ORAL at 11:50

## 2025-06-22 RX ADMIN — PANTOPRAZOLE SODIUM 40 MG: 40 TABLET, DELAYED RELEASE ORAL at 11:52

## 2025-06-22 RX ADMIN — BUDESONIDE 500 MCG: 0.5 INHALANT RESPIRATORY (INHALATION) at 07:31

## 2025-06-22 ASSESSMENT — PAIN SCALES - GENERAL: PAINLEVEL_OUTOF10: 0

## 2025-06-22 NOTE — PROGRESS NOTES
Hospitalist Progress Note    NAME:   Simeon An   : 1960   MRN: [id number]     Date/Time: 2025 3:28 PM  Patient PCP: Suzanne Corey MD    Estimated discharge date:  Barriers: Pulmonary clearance, COPD exacerbation improvement      Assessment / Plan:      Acute COPD exacerbation, resolving  Chronic hypoxemic respiratory failure on 2 L supplemental oxygen, at baseline  Admit to telemetry monitoring  Pulmonology consulted, greatly appreciate their expertise  Check CTA chest given persistent symptoms  Aggressive pulmonary toilet  Guaifenesin  Start montelukast  Trial hypertonic saline nebulizer  Continue prednisone to complete 5-day course of steroids  Continue azithromycin to complete 5-day course  Formally substitution arformoterol budesonide nebulizer  DuoNeb 4 times daily with every 4 hours as needed breakthrough nebs  As needed Tessalon     Normocytic anemia  Hemoglobin downtrending 11.6 => 9.5, continue to trend  No evidence for active bleeding on exam or history     Essential hypertension  Hyperlipidemia  Continue PTA hydrochlorothiazide, losartan, nifedipine, rosuvastatin     BPH  Bladder management protocol  Continue PTA tamsulosin        Medical Decision Making:   I personally reviewed labs: Yes  I personally reviewed imaging: Yes  I personally reviewed EKG: Yes  Toxic drug monitoring:   Discussed case with:         Code Status: Full  DVT Prophylaxis: Lovenox  GI Prophylaxis:    Subjective:     Chief Complaint / Reason for Physician Visit  \"\".  Discussed with RN events overnight.       Objective:     VITALS:   Last 24hrs VS reviewed since prior progress note. Most recent are:  Patient Vitals for the past 24 hrs:   BP Temp Temp src Pulse Resp SpO2   25 1523 -- -- -- 91 20 100 %   25 1150 139/77 -- -- -- -- --   25 1130 -- -- -- -- -- 98 %   25 1115 -- -- -- 84 20 98 %   25 0813 132/80 97.5 °F (36.4 °C) Oral 83 18 100 %   25 0744 -- -- -- -- --  100 %   06/22/25 0723 -- -- -- 88 20 100 %   06/22/25 0057 -- -- -- -- -- 100 %   06/21/25 2200 123/65 98.2 °F (36.8 °C) Oral 89 22 99 %   06/21/25 2049 -- -- -- 88 21 100 %   06/21/25 2045 -- -- -- 89 22 100 %   06/21/25 2043 -- -- -- 86 22 100 %   06/21/25 2039 -- -- -- 84 21 100 %         Intake/Output Summary (Last 24 hours) at 6/22/2025 1528  Last data filed at 6/22/2025 0815  Gross per 24 hour   Intake 10 ml   Output --   Net 10 ml        I had a face to face encounter and independently examined this patient on 6/22/2025, as outlined below:  PHYSICAL EXAM:  General: Alert, cooperative  EENT:  EOMI. Anicteric sclerae.  Resp:  CTA bilaterally, no wheezing or rales.  No accessory muscle use  CV:  Regular  rhythm,  No edema  GI:  Soft, Non distended, Non tender.  +Bowel sounds  Neurologic:  Alert and oriented X 3, normal speech,   Psych:   Good insight. Not anxious nor agitated  Skin:  No rashes.  No jaundice    Reviewed most current lab test results and cultures  YES  Reviewed most current radiology test results   YES  Review and summation of old records today    NO  Reviewed patient's current orders and MAR    YES  PMH/SH reviewed - no change compared to H&P    Procedures: see electronic medical records for all procedures/Xrays and details which were not copied into this note but were reviewed prior to creation of Plan.      LABS:  I reviewed today's most current labs and imaging studies.  Pertinent labs include:  Recent Labs     06/20/25 0412 06/21/25  0614 06/22/25  0302   WBC 11.0 7.7 8.2   HGB 11.6* 9.5* 9.8*   HCT 35.9* 29.1* 30.7*    299 269     Recent Labs     06/20/25 0412 06/21/25  0614 06/22/25  0302    143 140   K 3.6 3.3* 3.7   CL 99 103 105   CO2 35* 34* 32   GLUCOSE 143* 113* 145*   BUN 12 16 17   CREATININE 0.73 0.80 0.81   CALCIUM 9.2 8.9 9.1   MG 2.1 1.8  --    BILITOT 0.2  --   --    AST 11*  --   --    ALT 18  --   --        Signed: Akin Pitts MD

## 2025-06-22 NOTE — PROGRESS NOTES
ADULT PROTOCOL: JET AEROSOL ASSESSMENT    Patient  Simeon An     65 y.o.   male     6/22/2025  10:18 AM    Breath Sounds Pre Procedure: Breath Sounds Pre-Tx SAMANTHA: Diminished                                  Breath Sounds Pre-Tx LLL: Diminished        Breath Sounds Pre-Tx RUL: Diminished        Breath Sounds Pre-Tx RML: Diminished        Breath Sounds Pre-Tx RLL: Diminished  Breath Sounds Post Procedure: Breath Sounds Post-Tx SAMANTHA: Diminished          Breath Sounds Post-Tx LLL: Diminished          Breath Sounds Post-Tx RUL: Diminished          Breath Sounds Post-Tx RML: Diminished          Breath Sounds Post-Tx RLL: Diminished                                     Heart Rate: Pre procedure Pre-Tx Pulse: 88           Post procedure Post-Tx Pulse: 66    Resp Rate: Pre procedure Pre-Tx Resps: 20           Post procedure Post-Tx Resps: 20      Oxygen: O2 Therapy: Oxygen   nasal cannula     Changed: No    SpO2:  SpO2: 100 %   with Oxygen                Nebulizer Therapy: Current medications Medications: Budesonide/Formoterol      Changed: No      Problem List:   Patient Active Problem List   Diagnosis    COPD (chronic obstructive pulmonary disease) (HCC)    Tobacco abuse    Pulmonary emboli (HCC)    Alcoholism (HCC)    Hypertension    Encounter for long-term (current) use of other medications    Acute respiratory failure with hypoxia (HCC)    Respiratory failure (HCC)    Hyperlipidemia    GERD (gastroesophageal reflux disease)    COPD exacerbation (HCC)    COPD with acute exacerbation (HCC)       Respiratory Therapist: Claudia Mendes RT

## 2025-06-22 NOTE — PROGRESS NOTES
End of Shift Note    Bedside shift change report given to YURI Fairchild (oncoming nurse) by Tricia Means LPN (offgoing nurse).  Report included the following information SBAR, Intake/Output, MAR, Recent Results, and Alarm Parameters     Shift worked:  4246-2780     Shift summary and any significant changes:     Pt is calm and cooperative with no c/o pain. CTA done and respiratory panel collected. Plan of care ongoing     Concerns for physician to address:  None     Zone phone for oncoming shift:   [phone]       Activity:  Level of Assistance: Independent  Number times ambulated in hallways past shift: 0  Number of times OOB to chair past shift: 0    Cardiac:   Cardiac Monitoring: No      Cardiac Rhythm: Sinus rhythm    Access:  Current line(s): PIV     Genitourinary:   Urinary Status: Voiding, Bathroom privileges    Respiratory:   O2 Device: Nasal cannula  Chronic home O2 use?: YES  Incentive spirometer at bedside: NO    GI:     Current diet:  ADULT DIET; Regular; 4 carb choices (60 gm/meal); Low Fat/Low Chol/High Fiber/DENISA  ADULT ORAL NUTRITION SUPPLEMENT; Breakfast, Lunch, Dinner; Standard High Calorie/High Protein Oral Supplement  Passing flatus: YES    Pain Management:   Patient states pain is manageable on current regimen: N/A    Skin:  Lan Scale Score: 22  Interventions: Wound Offloading (Prevention Methods): Pillows, Repositioning, Turning    Patient Safety:  Fall Risk: Nursing Judgement-Fall Risk High(Add Comments): No  Fall Risk Interventions  Nursing Judgement-Fall Risk High(Add Comments): No  Toilet Every 2 Hours-In Advance of Need: No (Comment)  Hourly Visual Checks: Eyes closed, In bed  Fall Visual Posted: Fall sign posted, Socks  Room Door Open: Deferred to promote rest  Alarm On: Other (Comment)  Patient Moved Closer to Nursing Station: No    Active Consults:   IP CONSULT TO PULMONOLOGY    Length of Stay:  Expected LOS: 2  Actual LOS: 1    Tricia Means LPN
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
## 2025-06-22 NOTE — PROGRESS NOTES
End of Shift Note    Bedside shift change report given to YURI Fairchild (oncoming nurse) by Tricia Means LPN (offgoing nurse).  Report included the following information SBAR, Intake/Output, MAR, and Alarm Parameters     Shift worked:  3499-8324     Shift summary and any significant changes:     Pt calm and cooperative upon arrival. No c/o pain. Pt remains at 2L. TV dinner prepared. Plan of care ongoing     Concerns for physician to address:  None     Zone phone for oncoming shift:   [phone]       Activity:     Number times ambulated in hallways past shift: 0  Number of times OOB to chair past shift: 0    Cardiac:   Cardiac Monitoring: No           Access:  Current line(s): PIV     Genitourinary:        Respiratory:      Chronic home O2 use?: YES  Incentive spirometer at bedside: NO    GI:     Current diet:  ADULT DIET; Regular; 4 carb choices (60 gm/meal); Low Fat/Low Chol/High Fiber/DENISA  ADULT ORAL NUTRITION SUPPLEMENT; Breakfast, Lunch, Dinner; Standard High Calorie/High Protein Oral Supplement  Passing flatus: YES    Pain Management:   Patient states pain is manageable on current regimen: N/A    Skin:     Interventions:      Patient Safety:  Fall Risk:         Active Consults:   IP CONSULT TO PULMONOLOGY    Length of Stay:  Expected LOS: 2  Actual LOS: 0    Tricia Means LPN

## 2025-06-22 NOTE — PROGRESS NOTES
End of Shift Note    Bedside shift change report given to LONNIE Clarke (oncoming nurse) by Gurinder Garrett RN (offgoing nurse).  Report included the following information SBAR, Recent Results, and Quality Measures    Shift worked:  [id number]     Shift summary and any significant changes:     Patient was calm within the shift, he was alert and oriented times 4, patient took his pills whole per MAR. Hourly round was done on patient throughout the shift.     Plan of care is to go for a CTA-chest w/o contrast.     Concerns for physician to address:  none     Zone phone for oncoming shift:   [phone]       Activity:  Level of Assistance: Independent  Number times ambulated in hallways past shift: 0  Number of times OOB to chair past shift: 0    Cardiac:   Cardiac Monitoring: Yes      Cardiac Rhythm: Sinus rhythm    Access:  Current line(s): PIV    Genitourinary:   Urinary Status: Voiding, Bathroom privileges    Respiratory:   O2 Device: Nasal cannula  Chronic home O2 use?: NO  Incentive spirometer at bedside: NO    GI:     Current diet:  ADULT DIET; Regular; 4 carb choices (60 gm/meal); Low Fat/Low Chol/High Fiber/DENISA  ADULT ORAL NUTRITION SUPPLEMENT; Breakfast, Lunch, Dinner; Standard High Calorie/High Protein Oral Supplement  Passing flatus: YES    Pain Management:   Patient states pain is manageable on current regimen: YES    Skin:  Lan Scale Score: 22  Interventions: Wound Offloading (Prevention Methods): Pillows, Repositioning, Turning    Patient Safety:  Fall Risk: Nursing Judgement-Fall Risk High(Add Comments): No  Fall Risk Interventions  Nursing Judgement-Fall Risk High(Add Comments): No  Toilet Every 2 Hours-In Advance of Need: No (Comment)  Hourly Visual Checks: Eyes closed, In bed  Fall Visual Posted: Fall sign posted, Socks  Room Door Open: Deferred to promote rest  Alarm On: Other (Comment)  Patient Moved Closer to Nursing Station: No    Active Consults:   IP CONSULT TO PULMONOLOGY    Length of Stay:  Expected

## 2025-06-22 NOTE — RT PROTOCOL NOTE
ADULT PROTOCOL: JET AEROSOL ASSESSMENT    Patient  Simeon An     65 y.o.   male     6/21/2025  8:44 PM    Breath Sounds Pre Procedure: Breath Sounds Pre-Tx SAMANTHA: Coarse crackles                                  Breath Sounds Pre-Tx LLL: Coarse crackles        Breath Sounds Pre-Tx RUL: Coarse crackles        Breath Sounds Pre-Tx RML: Coarse crackles        Breath Sounds Pre-Tx RLL: Coarse crackles  Breath Sounds Post Procedure: Breath Sounds Post-Tx SAMANTHA: Coarse crackles          Breath Sounds Post-Tx LLL: Coarse crackles          Breath Sounds Post-Tx RUL: Coarse crackles          Breath Sounds Post-Tx RML: Coarse crackles          Breath Sounds Post-Tx RLL: Coarse crackles                                         Heart Rate: Pre procedure Pre-Tx Pulse: 84           Post procedure Post-Tx Pulse: 86    Resp Rate: Pre procedure Pre-Tx Resps: 21           Post procedure Post-Tx Resps: 22    Peak Flow: Pre bronchodilator             Post bronchodilator           Oxygen:     nasal cannula     Changed: No    SpO2:  SpO2: 100 %   with Oxygen                Nebulizer Therapy: Current medications Medications: Albuterol/Ipratropium, Sodium Chloride      Changed: No        Problem List:   Patient Active Problem List   Diagnosis    COPD (chronic obstructive pulmonary disease) (HCC)    Tobacco abuse    Pulmonary emboli (HCC)    Alcoholism (HCC)    Hypertension    Encounter for long-term (current) use of other medications    Acute respiratory failure with hypoxia (HCC)    Respiratory failure (HCC)    Hyperlipidemia    GERD (gastroesophageal reflux disease)    COPD exacerbation (HCC)    COPD with acute exacerbation (HCC)       Respiratory Therapist: Mary Butler RCP

## 2025-06-22 NOTE — H&P
Hospitalist Admission Note    NAME:  Simeon An   :  1960   MRN:  [id number]     Date/Time:  2025 8:15 PM    Patient PCP: Suzanne Corey MD    ______________________________________________________________________  Given the patient's current clinical presentation, I have a high level of concern for decompensation if discharged from the emergency department.  Complex decision making was performed, which includes reviewing the patient's available past medical records, laboratory results, and x-ray films.       My assessment of this patient's clinical condition and my plan of care is as follows.    Assessment / Plan:    Active Problems:  Acute COPD exacerbation, resolving  Chronic hypoxemic respiratory failure on 2 L supplemental oxygen, at baseline  Normocytic anemia  Essential hypertension  Hyperlipidemia  BPH    Plan:  Acute COPD exacerbation, resolving  Chronic hypoxemic respiratory failure on 2 L supplemental oxygen, at baseline  Admit to telemetry monitoring  Pulmonology consulted, greatly appreciate their expertise  Check CTA chest given persistent symptoms  Aggressive pulmonary toilet  Guaifenesin  Start montelukast  Trial hypertonic saline nebulizer  Continue prednisone to complete 5-day course of steroids  Extend azithromycin to complete 5-day course  Formally substitution arformoterol budesonide nebulizer  DuoNeb 4 times daily with every 4 hours as needed breakthrough nebs  As needed Tessalon    Normocytic anemia  Hemoglobin downtrending 11.6 => 9.5, continue to trend  No evidence for active bleeding on exam or history    Essential hypertension  Hyperlipidemia  Continue PTA hydrochlorothiazide, losartan, nifedipine, rosuvastatin    BPH  Bladder management protocol  Continue PTA tamsulosin      Medical Decision Making:   I personally reviewed labs: Yes, as listed below  I personally reviewed imaging: None  Toxic drug monitoring: No  Discussed case with: ED provider. After discussion

## 2025-06-22 NOTE — PLAN OF CARE
Problem: Respiratory - Adult  Goal: Achieves optimal ventilation and oxygenation  6/22/2025 0210 by Gurinder Garrett RN  Outcome: Progressing  6/21/2025 2044 by Mary Butler RCP  Outcome: Progressing     Problem: Discharge Planning  Goal: Discharge to home or other facility with appropriate resources  Outcome: Progressing

## 2025-06-23 VITALS
RESPIRATION RATE: 20 BRPM | TEMPERATURE: 97.8 F | SYSTOLIC BLOOD PRESSURE: 143 MMHG | HEART RATE: 94 BPM | DIASTOLIC BLOOD PRESSURE: 87 MMHG | OXYGEN SATURATION: 97 %

## 2025-06-23 PROCEDURE — 6360000002 HC RX W HCPCS: Performed by: STUDENT IN AN ORGANIZED HEALTH CARE EDUCATION/TRAINING PROGRAM

## 2025-06-23 PROCEDURE — 6370000000 HC RX 637 (ALT 250 FOR IP): Performed by: STUDENT IN AN ORGANIZED HEALTH CARE EDUCATION/TRAINING PROGRAM

## 2025-06-23 PROCEDURE — 6370000000 HC RX 637 (ALT 250 FOR IP)

## 2025-06-23 PROCEDURE — 2500000003 HC RX 250 WO HCPCS: Performed by: STUDENT IN AN ORGANIZED HEALTH CARE EDUCATION/TRAINING PROGRAM

## 2025-06-23 PROCEDURE — 2700000000 HC OXYGEN THERAPY PER DAY

## 2025-06-23 PROCEDURE — 94761 N-INVAS EAR/PLS OXIMETRY MLT: CPT

## 2025-06-23 PROCEDURE — 94669 MECHANICAL CHEST WALL OSCILL: CPT

## 2025-06-23 PROCEDURE — 94640 AIRWAY INHALATION TREATMENT: CPT

## 2025-06-23 RX ORDER — GUAIFENESIN 600 MG/1
600 TABLET, EXTENDED RELEASE ORAL 2 TIMES DAILY
Qty: 20 TABLET | Refills: 0 | Status: SHIPPED | OUTPATIENT
Start: 2025-06-23 | End: 2025-07-03

## 2025-06-23 RX ORDER — PREDNISONE 20 MG/1
40 TABLET ORAL DAILY
Qty: 10 TABLET | Refills: 0 | Status: SHIPPED | OUTPATIENT
Start: 2025-06-24 | End: 2025-06-29

## 2025-06-23 RX ORDER — AZITHROMYCIN 250 MG/1
250 TABLET, FILM COATED ORAL
Qty: 12 TABLET | Refills: 1 | Status: ON HOLD | OUTPATIENT
Start: 2025-06-23 | End: 2025-07-11

## 2025-06-23 RX ADMIN — PREDNISONE 40 MG: 20 TABLET ORAL at 10:32

## 2025-06-23 RX ADMIN — NIFEDIPINE 90 MG: 30 TABLET, FILM COATED, EXTENDED RELEASE ORAL at 10:32

## 2025-06-23 RX ADMIN — TAMSULOSIN HYDROCHLORIDE 0.4 MG: 0.4 CAPSULE ORAL at 10:33

## 2025-06-23 RX ADMIN — LOSARTAN POTASSIUM 100 MG: 100 TABLET, FILM COATED ORAL at 10:34

## 2025-06-23 RX ADMIN — IPRATROPIUM BROMIDE AND ALBUTEROL SULFATE 1 DOSE: .5; 3 SOLUTION RESPIRATORY (INHALATION) at 12:48

## 2025-06-23 RX ADMIN — ROSUVASTATIN CALCIUM 20 MG: 10 TABLET, FILM COATED ORAL at 10:32

## 2025-06-23 RX ADMIN — IPRATROPIUM BROMIDE AND ALBUTEROL SULFATE 1 DOSE: .5; 3 SOLUTION RESPIRATORY (INHALATION) at 08:25

## 2025-06-23 RX ADMIN — BUDESONIDE 500 MCG: 0.5 INHALANT RESPIRATORY (INHALATION) at 08:30

## 2025-06-23 RX ADMIN — FERROUS SULFATE TAB 325 MG (65 MG ELEMENTAL FE) 325 MG: 325 (65 FE) TAB at 10:32

## 2025-06-23 RX ADMIN — HYDROCHLOROTHIAZIDE 25 MG: 25 TABLET ORAL at 10:32

## 2025-06-23 RX ADMIN — SODIUM CHLORIDE, PRESERVATIVE FREE 10 ML: 5 INJECTION INTRAVENOUS at 10:34

## 2025-06-23 RX ADMIN — GUAIFENESIN 1200 MG: 600 TABLET, EXTENDED RELEASE ORAL at 10:33

## 2025-06-23 RX ADMIN — IPRATROPIUM BROMIDE AND ALBUTEROL SULFATE 1 DOSE: .5; 3 SOLUTION RESPIRATORY (INHALATION) at 16:08

## 2025-06-23 RX ADMIN — ENOXAPARIN SODIUM 40 MG: 100 INJECTION SUBCUTANEOUS at 10:34

## 2025-06-23 RX ADMIN — AZITHROMYCIN 250 MG: 250 TABLET, FILM COATED ORAL at 10:33

## 2025-06-23 RX ADMIN — ARFORMOTEROL TARTRATE 15 MCG: 15 SOLUTION RESPIRATORY (INHALATION) at 08:30

## 2025-06-23 RX ADMIN — PANTOPRAZOLE SODIUM 40 MG: 40 TABLET, DELAYED RELEASE ORAL at 10:33

## 2025-06-23 NOTE — RT PROTOCOL NOTE
ADULT PROTOCOL: JET AEROSOL ASSESSMENT    Patient  Simeon An     65 y.o.   male     6/22/2025  9:09 PM    Breath Sounds Pre Procedure: Breath Sounds Pre-Tx SAMANTHA: Diminished                                  Breath Sounds Pre-Tx LLL: Diminished        Breath Sounds Pre-Tx RUL: Diminished        Breath Sounds Pre-Tx RML: Diminished        Breath Sounds Pre-Tx RLL: Diminished  Breath Sounds Post Procedure: Breath Sounds Post-Tx SAMANTHA: Diminished          Breath Sounds Post-Tx LLL: Diminished          Breath Sounds Post-Tx RUL: Diminished          Breath Sounds Post-Tx RML: Diminished          Breath Sounds Post-Tx RLL: Diminished                                           Heart Rate: Pre procedure Pre-Tx Pulse: 90           Post procedure Post-Tx Pulse: 91    Resp Rate: Pre procedure Pre-Tx Resps: 22           Post procedure Post-Tx Resps: 20    Peak Flow: Pre bronchodilator             Post bronchodilator         Oxygen: O2 Therapy: Oxygen humidified   nasal cannula     Changed: No    SpO2:  SpO2: 99 %   with Oxygen                Nebulizer Therapy: Current medications Medications: Budesonide, Arformoterol      Changed: No        Problem List:   Patient Active Problem List   Diagnosis    COPD (chronic obstructive pulmonary disease) (HCC)    Tobacco abuse    Pulmonary emboli (HCC)    Alcoholism (HCC)    Hypertension    Encounter for long-term (current) use of other medications    Acute respiratory failure with hypoxia (HCC)    Respiratory failure (HCC)    Hyperlipidemia    GERD (gastroesophageal reflux disease)    COPD exacerbation (HCC)    COPD with acute exacerbation (HCC)       Respiratory Therapist: Mary Butler RCP
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
## 2025-06-23 NOTE — DISCHARGE INSTRUCTIONS
HOSPITALIST DISCHARGE INSTRUCTIONS    NAME: Simeon An   :  1960   MRN:  [id number]     Date/Time:  2025 12:05 PM    ADMIT DATE: 2025     DISCHARGE DATE: 2025     DISCHARGE DIAGNOSIS:  Acute on chronic respiratory failure with hypoxia-POA. Resolved, back on 2L/m oxygen as at baseline  COPD exacerbation  Acute bronchitis  Coronavirus HKU1    MEDICATIONS:  As per medication reconciliation  list  It is important that you take the medication exactly as they are prescribed.   Keep your medication in the bottles provided by the pharmacist and keep a list of the medication names, dosages, and times to be taken in your wallet.   Do not take other medications without consulting your doctor.     Pain Management: per above medications    What to do at Home    Recommended diet:  cardiac diet and low fat, low cholesterol diet    Recommended activity: activity as tolerated    If you have questions regarding the hospital related prescriptions or hospital related issues please call at .    If you experience any of the following symptoms then please call your primary care physician or return to the emergency room if you cannot get hold of your doctor:  Fever, chills, nausea, vomiting, diarrhea, change in mentation, falling, bleeding, shortness of breath,     Follow Up:  Dr Porfirio Parrish as recommended in office for PFTs  PCP as needed      Information obtained by :  I understand that if any problems occur once I am at home I am to contact my physician.    I understand and acknowledge receipt of the instructions indicated above.                                                                                                                                           Physician's or R.N.'s Signature                                                                  Date/Time

## 2025-06-23 NOTE — PROGRESS NOTES
PCP Hospital follow-up transitional care appointment has been scheduled with Dr. Suzanne Corey on 6/25/25 1600. This is a previously scheduled appt and currently first available. Pending patient discharge.
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
## 2025-06-23 NOTE — PROGRESS NOTES
End of Shift Note    Bedside shift change report given to LONNIE Rogers (oncoming nurse) by Gurinder Garrett RN (offgoing nurse).  Report included the following information SBAR, Intake/Output, and Quality Measures    Shift worked:  8886-8929     Shift summary and any significant changes:     Patient was tolerated care  well within the shift. Patient was alert and oriented times 4. He took his pills whole as per MAR. Hourly rounding was done . Plan of care ongoing.      Concerns for physician to address:  none     Zone phone for oncoming shift:   [phone]       Activity:  Level of Assistance: Independent  Number times ambulated in hallways past shift: 0  Number of times OOB to chair past shift: 0    Cardiac:   Cardiac Monitoring: Yes      Cardiac Rhythm: Sinus rhythm    Access:  Current line(s): PIV    Genitourinary:   Urinary Status: Voiding, Bathroom privileges    Respiratory:   O2 Device: Nasal cannula  Chronic home O2 use?: NO  Incentive spirometer at bedside: NO    GI:     Current diet:  ADULT DIET; Regular; 4 carb choices (60 gm/meal); Low Fat/Low Chol/High Fiber/DENISA  ADULT ORAL NUTRITION SUPPLEMENT; Breakfast, Lunch, Dinner; Standard High Calorie/High Protein Oral Supplement  Passing flatus: YES    Pain Management:   Patient states pain is manageable on current regimen: YES    Skin:  Lan Scale Score: 22  Interventions: Wound Offloading (Prevention Methods): Repositioning, Turning    Patient Safety:  Fall Risk: Nursing Judgement-Fall Risk High(Add Comments): No  Fall Risk Interventions  Nursing Judgement-Fall Risk High(Add Comments): No  Toilet Every 2 Hours-In Advance of Need: No (Comment)  Hourly Visual Checks: Eyes closed, In bed  Fall Visual Posted: Fall sign posted, Socks  Room Door Open: Deferred to promote rest  Alarm On: Other (Comment) (independent)  Patient Moved Closer to Nursing Station: No    Active Consults:   IP CONSULT TO PULMONOLOGY    Length of Stay:  Expected LOS: 2  Actual LOS: 2    Gurinder

## 2025-06-23 NOTE — PLAN OF CARE
Problem: Respiratory - Adult  Goal: Achieves optimal ventilation and oxygenation  6/23/2025 0119 by Gurinder Garrett RN  Outcome: Progressing  6/23/2025 0118 by Gurinder Garrett RN  Outcome: Progressing  6/22/2025 2108 by Mary Butler RCP  Outcome: Progressing     Problem: Safety - Adult  Goal: Free from fall injury  6/23/2025 0119 by Gurinder Garrett RN  Outcome: Progressing  6/23/2025 0118 by Gurinder Garrett RN  Outcome: Progressing     Problem: Discharge Planning  Goal: Discharge to home or other facility with appropriate resources  6/23/2025 0119 by Gurinder Garrett RN  Outcome: Progressing  6/23/2025 0118 by Gurinder Garrett RN  Outcome: Progressing     Problem: Respiratory - Adult  Goal: Achieves optimal ventilation and oxygenation  6/23/2025 0119 by Gurinder Garrett RN  Outcome: Progressing  6/23/2025 0118 by Gurinder Grarett RN  Outcome: Progressing  6/22/2025 2108 by Mary Butler RCP  Outcome: Progressing     Problem: Safety - Adult  Goal: Free from fall injury  6/23/2025 0119 by Gurinder Garrett RN  Outcome: Progressing  6/23/2025 0118 by Gurinder Garrett RN  Outcome: Progressing
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
## 2025-06-26 LAB
BACTERIA SPEC CULT: NORMAL
BACTERIA SPEC CULT: NORMAL
EKG ATRIAL RATE: 119 BPM
EKG DIAGNOSIS: NORMAL
EKG P AXIS: 77 DEGREES
EKG P-R INTERVAL: 160 MS
EKG Q-T INTERVAL: 328 MS
EKG QRS DURATION: 88 MS
EKG QTC CALCULATION (BAZETT): 461 MS
EKG R AXIS: 81 DEGREES
EKG T AXIS: 74 DEGREES
EKG VENTRICULAR RATE: 119 BPM
SERVICE CMNT-IMP: NORMAL
SERVICE CMNT-IMP: NORMAL
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
## 2025-07-09 ENCOUNTER — APPOINTMENT (OUTPATIENT)
Facility: HOSPITAL | Age: 65
End: 2025-07-09
Payer: MEDICARE

## 2025-07-09 ENCOUNTER — HOSPITAL ENCOUNTER (OUTPATIENT)
Facility: HOSPITAL | Age: 65
Setting detail: OBSERVATION
Discharge: HOME | End: 2025-07-11
Attending: EMERGENCY MEDICINE | Admitting: HOSPITALIST
Payer: MEDICARE

## 2025-07-09 DIAGNOSIS — J96.21 ACUTE AND CHRONIC RESPIRATORY FAILURE WITH HYPOXIA (HCC): ICD-10-CM

## 2025-07-09 DIAGNOSIS — J44.1 COPD EXACERBATION (HCC): Primary | ICD-10-CM

## 2025-07-09 LAB
ALBUMIN SERPL-MCNC: 3.9 G/DL (ref 3.5–5)
ALBUMIN/GLOB SERPL: 1 (ref 1.1–2.2)
ALP SERPL-CCNC: 67 U/L (ref 45–117)
ALT SERPL-CCNC: 16 U/L (ref 12–78)
ANION GAP SERPL CALC-SCNC: 8 MMOL/L (ref 2–12)
AST SERPL-CCNC: 12 U/L (ref 15–37)
BASOPHILS # BLD: 0.03 K/UL (ref 0–0.1)
BASOPHILS NFR BLD: 0.3 % (ref 0–1)
BILIRUB SERPL-MCNC: 0.2 MG/DL (ref 0.2–1)
BUN SERPL-MCNC: 16 MG/DL (ref 6–20)
BUN/CREAT SERPL: 20 (ref 12–20)
CALCIUM SERPL-MCNC: 9.9 MG/DL (ref 8.5–10.1)
CHLORIDE SERPL-SCNC: 101 MMOL/L (ref 97–108)
CO2 SERPL-SCNC: 34 MMOL/L (ref 21–32)
CREAT SERPL-MCNC: 0.82 MG/DL (ref 0.7–1.3)
DIFFERENTIAL METHOD BLD: ABNORMAL
EOSINOPHIL # BLD: 0.17 K/UL (ref 0–0.4)
EOSINOPHIL NFR BLD: 1.8 % (ref 0–7)
ERYTHROCYTE [DISTWIDTH] IN BLOOD BY AUTOMATED COUNT: 15.7 % (ref 11.5–14.5)
FLUAV RNA SPEC QL NAA+PROBE: NOT DETECTED
FLUBV RNA SPEC QL NAA+PROBE: NOT DETECTED
GLOBULIN SER CALC-MCNC: 4.1 G/DL (ref 2–4)
GLUCOSE SERPL-MCNC: 118 MG/DL (ref 65–100)
HCT VFR BLD AUTO: 37.6 % (ref 36.6–50.3)
HGB BLD-MCNC: 12 G/DL (ref 12.1–17)
IMM GRANULOCYTES # BLD AUTO: 0.03 K/UL (ref 0–0.04)
IMM GRANULOCYTES NFR BLD AUTO: 0.3 % (ref 0–0.5)
LYMPHOCYTES # BLD: 2.53 K/UL (ref 0.8–3.5)
LYMPHOCYTES NFR BLD: 27.1 % (ref 12–49)
MAGNESIUM SERPL-MCNC: 1.8 MG/DL (ref 1.6–2.4)
MCH RBC QN AUTO: 29.3 PG (ref 26–34)
MCHC RBC AUTO-ENTMCNC: 31.9 G/DL (ref 30–36.5)
MCV RBC AUTO: 91.9 FL (ref 80–99)
MONOCYTES # BLD: 0.97 K/UL (ref 0–1)
MONOCYTES NFR BLD: 10.4 % (ref 5–13)
NEUTS SEG # BLD: 5.62 K/UL (ref 1.8–8)
NEUTS SEG NFR BLD: 60.1 % (ref 32–75)
NRBC # BLD: 0 K/UL (ref 0–0.01)
NRBC BLD-RTO: 0 PER 100 WBC
NT PRO BNP: 15 PG/ML (ref 0–125)
PLATELET # BLD AUTO: 371 K/UL (ref 150–400)
PMV BLD AUTO: 8.7 FL (ref 8.9–12.9)
POTASSIUM SERPL-SCNC: 3.4 MMOL/L (ref 3.5–5.1)
PROT SERPL-MCNC: 8 G/DL (ref 6.4–8.2)
RBC # BLD AUTO: 4.09 M/UL (ref 4.1–5.7)
SARS-COV-2 RNA RESP QL NAA+PROBE: NOT DETECTED
SODIUM SERPL-SCNC: 143 MMOL/L (ref 136–145)
SOURCE: NORMAL
TROPONIN I SERPL HS-MCNC: 7 NG/L (ref 0–76)
WBC # BLD AUTO: 9.4 K/UL (ref 4.1–11.1)

## 2025-07-09 PROCEDURE — 80053 COMPREHEN METABOLIC PANEL: CPT

## 2025-07-09 PROCEDURE — 93005 ELECTROCARDIOGRAM TRACING: CPT | Performed by: EMERGENCY MEDICINE

## 2025-07-09 PROCEDURE — 96374 THER/PROPH/DIAG INJ IV PUSH: CPT

## 2025-07-09 PROCEDURE — 83735 ASSAY OF MAGNESIUM: CPT

## 2025-07-09 PROCEDURE — 2500000003 HC RX 250 WO HCPCS: Performed by: HOSPITALIST

## 2025-07-09 PROCEDURE — 83880 ASSAY OF NATRIURETIC PEPTIDE: CPT

## 2025-07-09 PROCEDURE — 84484 ASSAY OF TROPONIN QUANT: CPT

## 2025-07-09 PROCEDURE — 2500000003 HC RX 250 WO HCPCS

## 2025-07-09 PROCEDURE — 6370000000 HC RX 637 (ALT 250 FOR IP): Performed by: EMERGENCY MEDICINE

## 2025-07-09 PROCEDURE — G0378 HOSPITAL OBSERVATION PER HR: HCPCS

## 2025-07-09 PROCEDURE — 85025 COMPLETE CBC W/AUTO DIFF WBC: CPT

## 2025-07-09 PROCEDURE — 6360000002 HC RX W HCPCS: Performed by: HOSPITALIST

## 2025-07-09 PROCEDURE — 99285 EMERGENCY DEPT VISIT HI MDM: CPT

## 2025-07-09 PROCEDURE — 87636 SARSCOV2 & INF A&B AMP PRB: CPT

## 2025-07-09 PROCEDURE — 71045 X-RAY EXAM CHEST 1 VIEW: CPT

## 2025-07-09 PROCEDURE — 6370000000 HC RX 637 (ALT 250 FOR IP): Performed by: HOSPITALIST

## 2025-07-09 PROCEDURE — 94640 AIRWAY INHALATION TREATMENT: CPT

## 2025-07-09 PROCEDURE — 36415 COLL VENOUS BLD VENIPUNCTURE: CPT

## 2025-07-09 RX ORDER — ONDANSETRON 2 MG/ML
4 INJECTION INTRAMUSCULAR; INTRAVENOUS EVERY 6 HOURS PRN
Status: DISCONTINUED | OUTPATIENT
Start: 2025-07-09 | End: 2025-07-11 | Stop reason: HOSPADM

## 2025-07-09 RX ORDER — IPRATROPIUM BROMIDE AND ALBUTEROL SULFATE 2.5; .5 MG/3ML; MG/3ML
1 SOLUTION RESPIRATORY (INHALATION)
Status: DISCONTINUED | OUTPATIENT
Start: 2025-07-10 | End: 2025-07-10

## 2025-07-09 RX ORDER — ALBUTEROL SULFATE 0.83 MG/ML
2.5 SOLUTION RESPIRATORY (INHALATION)
Status: DISCONTINUED | OUTPATIENT
Start: 2025-07-09 | End: 2025-07-11 | Stop reason: HOSPADM

## 2025-07-09 RX ORDER — IPRATROPIUM BROMIDE AND ALBUTEROL SULFATE 2.5; .5 MG/3ML; MG/3ML
1 SOLUTION RESPIRATORY (INHALATION)
Status: COMPLETED | OUTPATIENT
Start: 2025-07-09 | End: 2025-07-09

## 2025-07-09 RX ORDER — ENOXAPARIN SODIUM 100 MG/ML
40 INJECTION SUBCUTANEOUS DAILY
Status: DISCONTINUED | OUTPATIENT
Start: 2025-07-10 | End: 2025-07-11 | Stop reason: HOSPADM

## 2025-07-09 RX ORDER — ACETAMINOPHEN 650 MG/1
650 SUPPOSITORY RECTAL EVERY 6 HOURS PRN
Status: DISCONTINUED | OUTPATIENT
Start: 2025-07-09 | End: 2025-07-11 | Stop reason: HOSPADM

## 2025-07-09 RX ORDER — METHYLPREDNISOLONE SODIUM SUCCINATE 125 MG/2ML
80 INJECTION INTRAMUSCULAR; INTRAVENOUS
Status: DISCONTINUED | OUTPATIENT
Start: 2025-07-09 | End: 2025-07-09

## 2025-07-09 RX ORDER — SODIUM CHLORIDE 9 MG/ML
INJECTION, SOLUTION INTRAVENOUS PRN
Status: DISCONTINUED | OUTPATIENT
Start: 2025-07-09 | End: 2025-07-11 | Stop reason: HOSPADM

## 2025-07-09 RX ORDER — METHYLPREDNISOLONE SODIUM SUCCINATE 40 MG/ML
40 INJECTION INTRAMUSCULAR; INTRAVENOUS EVERY 6 HOURS
Status: DISCONTINUED | OUTPATIENT
Start: 2025-07-09 | End: 2025-07-11 | Stop reason: HOSPADM

## 2025-07-09 RX ORDER — ACETAMINOPHEN 325 MG/1
650 TABLET ORAL EVERY 6 HOURS PRN
Status: DISCONTINUED | OUTPATIENT
Start: 2025-07-09 | End: 2025-07-11 | Stop reason: HOSPADM

## 2025-07-09 RX ORDER — SODIUM CHLORIDE 0.9 % (FLUSH) 0.9 %
5-40 SYRINGE (ML) INJECTION PRN
Status: DISCONTINUED | OUTPATIENT
Start: 2025-07-09 | End: 2025-07-11 | Stop reason: HOSPADM

## 2025-07-09 RX ORDER — LEVOFLOXACIN 500 MG/1
500 TABLET, FILM COATED ORAL DAILY
Status: DISCONTINUED | OUTPATIENT
Start: 2025-07-10 | End: 2025-07-11 | Stop reason: HOSPADM

## 2025-07-09 RX ORDER — SODIUM CHLORIDE 0.9 % (FLUSH) 0.9 %
5-40 SYRINGE (ML) INJECTION EVERY 12 HOURS SCHEDULED
Status: DISCONTINUED | OUTPATIENT
Start: 2025-07-09 | End: 2025-07-11 | Stop reason: HOSPADM

## 2025-07-09 RX ORDER — POLYETHYLENE GLYCOL 3350 17 G/17G
17 POWDER, FOR SOLUTION ORAL DAILY PRN
Status: DISCONTINUED | OUTPATIENT
Start: 2025-07-09 | End: 2025-07-11 | Stop reason: HOSPADM

## 2025-07-09 RX ORDER — PREDNISONE 20 MG/1
40 TABLET ORAL DAILY
Status: DISCONTINUED | OUTPATIENT
Start: 2025-07-12 | End: 2025-07-11 | Stop reason: HOSPADM

## 2025-07-09 RX ORDER — WATER 10 ML/10ML
INJECTION INTRAMUSCULAR; INTRAVENOUS; SUBCUTANEOUS
Status: COMPLETED
Start: 2025-07-09 | End: 2025-07-09

## 2025-07-09 RX ORDER — ONDANSETRON 4 MG/1
4 TABLET, ORALLY DISINTEGRATING ORAL EVERY 8 HOURS PRN
Status: DISCONTINUED | OUTPATIENT
Start: 2025-07-09 | End: 2025-07-11 | Stop reason: HOSPADM

## 2025-07-09 RX ADMIN — SODIUM CHLORIDE, PRESERVATIVE FREE 10 ML: 5 INJECTION INTRAVENOUS at 22:15

## 2025-07-09 RX ADMIN — IPRATROPIUM BROMIDE AND ALBUTEROL SULFATE 1 DOSE: .5; 2.5 SOLUTION RESPIRATORY (INHALATION) at 20:12

## 2025-07-09 RX ADMIN — METHYLPREDNISOLONE SODIUM SUCCINATE 40 MG: 40 INJECTION INTRAMUSCULAR; INTRAVENOUS at 22:13

## 2025-07-09 RX ADMIN — WATER 10 ML: 1 INJECTION INTRAMUSCULAR; INTRAVENOUS; SUBCUTANEOUS at 22:15

## 2025-07-09 RX ADMIN — IPRATROPIUM BROMIDE AND ALBUTEROL SULFATE 1 DOSE: .5; 2.5 SOLUTION RESPIRATORY (INHALATION) at 23:33

## 2025-07-09 ASSESSMENT — LIFESTYLE VARIABLES
HOW OFTEN DO YOU HAVE A DRINK CONTAINING ALCOHOL: MONTHLY OR LESS
HOW MANY STANDARD DRINKS CONTAINING ALCOHOL DO YOU HAVE ON A TYPICAL DAY: PATIENT DOES NOT DRINK

## 2025-07-09 ASSESSMENT — ENCOUNTER SYMPTOMS
WHEEZING: 1
ABDOMINAL PAIN: 0
SHORTNESS OF BREATH: 1
COUGH: 1

## 2025-07-09 NOTE — ED TRIAGE NOTES
Pt arrived via EMS from home with report of SOB/wheezing since yesterday. EMS reports pt used his albuterol Inhaler and neb treatment just prior to their arrival. They gave pt Sulumedrol 125 mg IVP in route to ED. Pt normally on oxygen at 2 lpm via NC for his COPD - SpO2 97 % at ome on EMS arrival. EMS placed hoim on 4 lpm via NC - then 100%. Denies to 2 lpm via NC on arrival to ED at 100%.

## 2025-07-10 LAB
ALBUMIN SERPL-MCNC: 3.2 G/DL (ref 3.5–5)
ALBUMIN/GLOB SERPL: 0.9 (ref 1.1–2.2)
ALP SERPL-CCNC: 57 U/L (ref 45–117)
ALT SERPL-CCNC: 14 U/L (ref 12–78)
ANION GAP SERPL CALC-SCNC: 9 MMOL/L (ref 2–12)
AST SERPL-CCNC: 9 U/L (ref 15–37)
BASOPHILS # BLD: 0.01 K/UL (ref 0–0.1)
BASOPHILS NFR BLD: 0.2 % (ref 0–1)
BILIRUB SERPL-MCNC: 0.2 MG/DL (ref 0.2–1)
BUN SERPL-MCNC: 21 MG/DL (ref 6–20)
BUN/CREAT SERPL: 24 (ref 12–20)
CALCIUM SERPL-MCNC: 9.6 MG/DL (ref 8.5–10.1)
CHLORIDE SERPL-SCNC: 100 MMOL/L (ref 97–108)
CO2 SERPL-SCNC: 31 MMOL/L (ref 21–32)
CREAT SERPL-MCNC: 0.87 MG/DL (ref 0.7–1.3)
DIFFERENTIAL METHOD BLD: ABNORMAL
EOSINOPHIL # BLD: 0 K/UL (ref 0–0.4)
EOSINOPHIL NFR BLD: 0 % (ref 0–7)
ERYTHROCYTE [DISTWIDTH] IN BLOOD BY AUTOMATED COUNT: 15.7 % (ref 11.5–14.5)
GLOBULIN SER CALC-MCNC: 3.6 G/DL (ref 2–4)
GLUCOSE SERPL-MCNC: 205 MG/DL (ref 65–100)
HCT VFR BLD AUTO: 34.2 % (ref 36.6–50.3)
HGB BLD-MCNC: 10.8 G/DL (ref 12.1–17)
IMM GRANULOCYTES # BLD AUTO: 0.02 K/UL (ref 0–0.04)
IMM GRANULOCYTES NFR BLD AUTO: 0.4 % (ref 0–0.5)
LYMPHOCYTES # BLD: 0.59 K/UL (ref 0.8–3.5)
LYMPHOCYTES NFR BLD: 12.1 % (ref 12–49)
MCH RBC QN AUTO: 29.1 PG (ref 26–34)
MCHC RBC AUTO-ENTMCNC: 31.6 G/DL (ref 30–36.5)
MCV RBC AUTO: 92.2 FL (ref 80–99)
MONOCYTES # BLD: 0.16 K/UL (ref 0–1)
MONOCYTES NFR BLD: 3.3 % (ref 5–13)
NEUTS SEG # BLD: 4.12 K/UL (ref 1.8–8)
NEUTS SEG NFR BLD: 84 % (ref 32–75)
NRBC # BLD: 0 K/UL (ref 0–0.01)
NRBC BLD-RTO: 0 PER 100 WBC
PLATELET # BLD AUTO: 373 K/UL (ref 150–400)
PMV BLD AUTO: 9 FL (ref 8.9–12.9)
POTASSIUM SERPL-SCNC: 4 MMOL/L (ref 3.5–5.1)
PROT SERPL-MCNC: 6.8 G/DL (ref 6.4–8.2)
RBC # BLD AUTO: 3.71 M/UL (ref 4.1–5.7)
RBC MORPH BLD: ABNORMAL
SODIUM SERPL-SCNC: 140 MMOL/L (ref 136–145)
WBC # BLD AUTO: 4.9 K/UL (ref 4.1–11.1)

## 2025-07-10 PROCEDURE — 6370000000 HC RX 637 (ALT 250 FOR IP): Performed by: HOSPITALIST

## 2025-07-10 PROCEDURE — 94640 AIRWAY INHALATION TREATMENT: CPT

## 2025-07-10 PROCEDURE — 2500000003 HC RX 250 WO HCPCS

## 2025-07-10 PROCEDURE — 80053 COMPREHEN METABOLIC PANEL: CPT

## 2025-07-10 PROCEDURE — 94761 N-INVAS EAR/PLS OXIMETRY MLT: CPT

## 2025-07-10 PROCEDURE — 96376 TX/PRO/DX INJ SAME DRUG ADON: CPT

## 2025-07-10 PROCEDURE — 2500000003 HC RX 250 WO HCPCS: Performed by: HOSPITALIST

## 2025-07-10 PROCEDURE — 96372 THER/PROPH/DIAG INJ SC/IM: CPT

## 2025-07-10 PROCEDURE — G0378 HOSPITAL OBSERVATION PER HR: HCPCS

## 2025-07-10 PROCEDURE — 2700000000 HC OXYGEN THERAPY PER DAY

## 2025-07-10 PROCEDURE — 6370000000 HC RX 637 (ALT 250 FOR IP): Performed by: INTERNAL MEDICINE

## 2025-07-10 PROCEDURE — 6360000002 HC RX W HCPCS: Performed by: INTERNAL MEDICINE

## 2025-07-10 PROCEDURE — 85025 COMPLETE CBC W/AUTO DIFF WBC: CPT

## 2025-07-10 PROCEDURE — 6360000002 HC RX W HCPCS: Performed by: HOSPITALIST

## 2025-07-10 PROCEDURE — 36415 COLL VENOUS BLD VENIPUNCTURE: CPT

## 2025-07-10 RX ORDER — PREDNISONE 20 MG/1
40 TABLET ORAL 2 TIMES DAILY
Status: ON HOLD | COMMUNITY
End: 2025-07-11 | Stop reason: HOSPADM

## 2025-07-10 RX ORDER — BUDESONIDE 0.5 MG/2ML
0.5 INHALANT ORAL
Status: DISCONTINUED | OUTPATIENT
Start: 2025-07-10 | End: 2025-07-10

## 2025-07-10 RX ORDER — GUAIFENESIN 600 MG/1
1200 TABLET, EXTENDED RELEASE ORAL 2 TIMES DAILY
Status: DISCONTINUED | OUTPATIENT
Start: 2025-07-10 | End: 2025-07-11 | Stop reason: HOSPADM

## 2025-07-10 RX ORDER — ALBUTEROL SULFATE 0.83 MG/ML
2.5 SOLUTION RESPIRATORY (INHALATION)
Status: DISCONTINUED | OUTPATIENT
Start: 2025-07-10 | End: 2025-07-11 | Stop reason: HOSPADM

## 2025-07-10 RX ORDER — WATER 10 ML/10ML
INJECTION INTRAMUSCULAR; INTRAVENOUS; SUBCUTANEOUS
Status: COMPLETED
Start: 2025-07-10 | End: 2025-07-10

## 2025-07-10 RX ORDER — ROSUVASTATIN CALCIUM 20 MG/1
20 TABLET, COATED ORAL DAILY
Status: DISCONTINUED | OUTPATIENT
Start: 2025-07-10 | End: 2025-07-11 | Stop reason: HOSPADM

## 2025-07-10 RX ORDER — FERROUS GLUCONATE 324(37.5)
648 TABLET ORAL
Status: DISCONTINUED | OUTPATIENT
Start: 2025-07-10 | End: 2025-07-11 | Stop reason: HOSPADM

## 2025-07-10 RX ORDER — ALBUTEROL SULFATE 90 UG/1
2 INHALANT RESPIRATORY (INHALATION) EVERY 6 HOURS PRN
COMMUNITY

## 2025-07-10 RX ORDER — GUAIFENESIN 600 MG/1
600 TABLET, EXTENDED RELEASE ORAL 2 TIMES DAILY
COMMUNITY

## 2025-07-10 RX ORDER — PRENATAL VIT 91/IRON/FOLIC/DHA 28-975-200
COMBINATION PACKAGE (EA) ORAL 2 TIMES DAILY
COMMUNITY

## 2025-07-10 RX ORDER — LOSARTAN POTASSIUM 100 MG/1
100 TABLET ORAL DAILY
Status: DISCONTINUED | OUTPATIENT
Start: 2025-07-10 | End: 2025-07-11 | Stop reason: HOSPADM

## 2025-07-10 RX ORDER — TAMSULOSIN HYDROCHLORIDE 0.4 MG/1
0.4 CAPSULE ORAL DAILY
Status: DISCONTINUED | OUTPATIENT
Start: 2025-07-10 | End: 2025-07-11 | Stop reason: HOSPADM

## 2025-07-10 RX ORDER — HYDROCHLOROTHIAZIDE 25 MG/1
25 TABLET ORAL DAILY
Status: DISCONTINUED | OUTPATIENT
Start: 2025-07-10 | End: 2025-07-11 | Stop reason: HOSPADM

## 2025-07-10 RX ORDER — ARFORMOTEROL TARTRATE 15 UG/2ML
15 SOLUTION RESPIRATORY (INHALATION)
Status: DISCONTINUED | OUTPATIENT
Start: 2025-07-10 | End: 2025-07-10

## 2025-07-10 RX ORDER — METHYLPREDNISOLONE 4 MG/1
4 TABLET ORAL SEE ADMIN INSTRUCTIONS
Status: ON HOLD | COMMUNITY
End: 2025-07-11 | Stop reason: HOSPADM

## 2025-07-10 RX ORDER — NIFEDIPINE 30 MG/1
90 TABLET, EXTENDED RELEASE ORAL DAILY
Status: DISCONTINUED | OUTPATIENT
Start: 2025-07-10 | End: 2025-07-11 | Stop reason: HOSPADM

## 2025-07-10 RX ORDER — PANTOPRAZOLE SODIUM 40 MG/1
40 TABLET, DELAYED RELEASE ORAL DAILY
Status: DISCONTINUED | OUTPATIENT
Start: 2025-07-10 | End: 2025-07-11 | Stop reason: HOSPADM

## 2025-07-10 RX ADMIN — HYDROCHLOROTHIAZIDE 25 MG: 25 TABLET ORAL at 08:47

## 2025-07-10 RX ADMIN — ALBUTEROL SULFATE 2.5 MG: 2.5 SOLUTION RESPIRATORY (INHALATION) at 19:57

## 2025-07-10 RX ADMIN — LOSARTAN POTASSIUM 100 MG: 100 TABLET, FILM COATED ORAL at 08:47

## 2025-07-10 RX ADMIN — WATER 10 ML: 1 INJECTION INTRAMUSCULAR; INTRAVENOUS; SUBCUTANEOUS at 21:36

## 2025-07-10 RX ADMIN — WATER 10 ML: 1 INJECTION INTRAMUSCULAR; INTRAVENOUS; SUBCUTANEOUS at 05:26

## 2025-07-10 RX ADMIN — ROSUVASTATIN 20 MG: 20 TABLET, FILM COATED ORAL at 08:47

## 2025-07-10 RX ADMIN — IPRATROPIUM BROMIDE AND ALBUTEROL SULFATE 1 DOSE: .5; 2.5 SOLUTION RESPIRATORY (INHALATION) at 03:45

## 2025-07-10 RX ADMIN — SODIUM CHLORIDE, PRESERVATIVE FREE 10 ML: 5 INJECTION INTRAVENOUS at 21:03

## 2025-07-10 RX ADMIN — PANTOPRAZOLE SODIUM 40 MG: 40 TABLET, DELAYED RELEASE ORAL at 08:47

## 2025-07-10 RX ADMIN — IPRATROPIUM BROMIDE AND ALBUTEROL SULFATE 1 DOSE: .5; 2.5 SOLUTION RESPIRATORY (INHALATION) at 11:15

## 2025-07-10 RX ADMIN — METHYLPREDNISOLONE SODIUM SUCCINATE 40 MG: 40 INJECTION INTRAMUSCULAR; INTRAVENOUS at 15:55

## 2025-07-10 RX ADMIN — SERTRALINE 50 MG: 50 TABLET, FILM COATED ORAL at 21:02

## 2025-07-10 RX ADMIN — GUAIFENESIN 1200 MG: 600 TABLET ORAL at 21:02

## 2025-07-10 RX ADMIN — ENOXAPARIN SODIUM 40 MG: 100 INJECTION SUBCUTANEOUS at 08:40

## 2025-07-10 RX ADMIN — BUDESONIDE 500 MCG: 0.5 SUSPENSION RESPIRATORY (INHALATION) at 07:11

## 2025-07-10 RX ADMIN — METHYLPREDNISOLONE SODIUM SUCCINATE 40 MG: 40 INJECTION INTRAMUSCULAR; INTRAVENOUS at 05:26

## 2025-07-10 RX ADMIN — GUAIFENESIN 1200 MG: 600 TABLET ORAL at 08:48

## 2025-07-10 RX ADMIN — WATER 10 ML: 1 INJECTION INTRAMUSCULAR; INTRAVENOUS; SUBCUTANEOUS at 08:48

## 2025-07-10 RX ADMIN — TAMSULOSIN HYDROCHLORIDE 0.4 MG: 0.4 CAPSULE ORAL at 08:47

## 2025-07-10 RX ADMIN — IPRATROPIUM BROMIDE AND ALBUTEROL SULFATE 1 DOSE: .5; 2.5 SOLUTION RESPIRATORY (INHALATION) at 07:11

## 2025-07-10 RX ADMIN — WATER 10 ML: 1 INJECTION INTRAMUSCULAR; INTRAVENOUS; SUBCUTANEOUS at 15:55

## 2025-07-10 RX ADMIN — SODIUM CHLORIDE, PRESERVATIVE FREE 10 ML: 5 INJECTION INTRAVENOUS at 08:48

## 2025-07-10 RX ADMIN — NIFEDIPINE 90 MG: 30 TABLET, FILM COATED, EXTENDED RELEASE ORAL at 08:47

## 2025-07-10 RX ADMIN — METHYLPREDNISOLONE SODIUM SUCCINATE 40 MG: 40 INJECTION INTRAMUSCULAR; INTRAVENOUS at 21:36

## 2025-07-10 RX ADMIN — LEVOFLOXACIN 500 MG: 500 TABLET, FILM COATED ORAL at 08:46

## 2025-07-10 RX ADMIN — GUAIFENESIN 1200 MG: 600 TABLET ORAL at 02:17

## 2025-07-10 RX ADMIN — ARFORMOTEROL TARTRATE 15 MCG: 15 SOLUTION RESPIRATORY (INHALATION) at 07:11

## 2025-07-10 RX ADMIN — METHYLPREDNISOLONE SODIUM SUCCINATE 40 MG: 40 INJECTION INTRAMUSCULAR; INTRAVENOUS at 08:47

## 2025-07-10 RX ADMIN — Medication 648 MG: at 08:47
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
## 2025-07-10 NOTE — ED NOTES
Admission SBAR Note  Situation/Background:     Patient is being transferred to [facility]urg ([facility]), Room# 122    Patient's Chief Complaint was shortness of breath and is admitted for COPD exacerbation.    CODE STATUS: Full  CSSRS: 0 - No Risk    ISOLATION/PRECAUTIONS: No    The following personal items will be sent with the patient during transfer to the floor:     All valuables: jewerly necklace and ring,  with patient at bedside  and clothing shirt, pants, underwear,  with patient at bedside       ASSESSMENT:    NEURO:   ORIENTATION LEVEL: ORIENTATION LEVEL: Person, Place, Time, and Situation  Cognition:  appropriate decision making, appropriate for age attention/concentration, appropriate safety awareness, following commands  follows multi-step complex commands/direction, and recognition of people/places  Speech: shows no evidence of impairment    Is patient impulsive? No  Is patient oriented? Yes  Do they follow commands? Yes  Is the patient ambulatory? Yes    FALL RISK? Yes  Interventions: Implemented/recommended use of non-skid footwear, Implemented/recommended use of fall risk identification flag to all team members, Implemented/recommended assistive devices and encouraged their use, Implemented/recommended resources for alarm system (personal alarm, bed alarm, call bell, etc.) , Implemented/recommended environmental changes (remove hazards, lower bed, improve lighting, etc.), and Implemented/recommended increased supervision/assistance    RESPIRATORY:   Is patient on oxygen? Yes  Oxygen therapy: Nasal cannula  O2 rate: 2    CARDIAC:   Is cardiac monitoring ordered? Yes    Last Rhythm: Rhythm including paccardio: Normal Sinus Rhythm   Patient to transfer with tele box on? Yes  Infusions: Meds; iv fluids: none  LINE ACCESS: 18G Peripheral IV , Antecubital and Right        /GI:   Continent Bowel/Bladder? Yes  Was UA with reflex sent to lab?

## 2025-07-10 NOTE — ACP (ADVANCE CARE PLANNING)
Spoke with patient at the bedside and he confirmed that he does have Advanced Care Planning documents that appoint his daughter Ernestine Ley as medical POA, contact # [phone] (mobile per document), [phone] per chart.

## 2025-07-10 NOTE — PROGRESS NOTES
Pharmacy Clarification of Prior to Admission Medication Regimen     The patient was interviewed regarding clarification of the prior to admission medication regimen. Patient present in room and obtained permission from patient to discuss drug regimen with visitor(s) present.     Information Obtained From: Patient    Allergies updated/ Reaction: NKDA    Organizer (pill box, bottles, etc): Pill Bottles    Additions: Albuterol inhaler, Ipratropium neb sol, Terbinafine cream, Mucus relief, Methylprednisolone, Prednisone    Medications that need DELETION: None    Changes: None    Pertinent Pharmacy Findings:  Updated patient’s preferred outpatient pharmacy to: [facility] in [place], [place]    Patient was questioned regarding use of any other inhalers, topical products, over the counter medications, herbal medications, vitamin products or ophthalmic/nasal/otic medication use.        Patient was inquired about side effects and was asked about pharmacist consultation if needed or desired (Y/N): Y      PTA medication list was corrected to the following:     Prior to Admission Medications   Prescriptions Last Dose Informant Patient Reported? Taking?   Budeson-Glycopyrrol-Formoterol (BREZTRI AEROSPHERE) 160-9-4.8 MCG/ACT AERO 7/9/2025  Yes Yes   Sig: Inhale 2 puffs into the lungs in the morning and at bedtime   NIFEdipine (PROCARDIA XL) 90 MG extended release tablet 7/9/2025  Yes Yes   Sig: Take 1 tablet by mouth daily   albuterol (PROVENTIL) (2.5 MG/3ML) 0.083% nebulizer solution 7/9/2025  Yes Yes   Sig: Take 3 mLs by nebulization every 4 hours as needed for Wheezing or Shortness of Breath   albuterol sulfate HFA (VENTOLIN HFA) 108 (90 Base) MCG/ACT inhaler 7/9/2025  Yes Yes   Sig: Inhale 2 puffs into the lungs every 6 hours as needed for Wheezing   azithromycin (ZITHROMAX) 250 MG tablet 7/9/2025  Yes Yes   Sig: Take 1 tablet by mouth three times a week M,W,F   ferrous gluconate (FERGON) 324 (38 Fe) MG tablet 7/9/2025  Yes Yes

## 2025-07-10 NOTE — H&P
Hospitalist Admission Note    NAME:   Simeon An   : 1960   MRN: [id number]     Date/Time: 2025 10:00 PM    Patient PCP: Suzanne Corey MD    ______________________________________________________________________  Given the patient's current clinical presentation, I have a high level of concern for decompensation if discharged from the emergency department.  Complex decision making was performed, which includes reviewing the patient's available past medical records, laboratory results, and x-ray films.       My assessment of this patient's clinical condition and my plan of care is as follows.    Assessment / Plan:    65 y.o.  male with PMHx significant for COPD on 2 L of oxygen at home, hypertension, hyperlipidemia and BPH who presents to the emergency department complaining of cough and shortness of breath.  Of note, the patient has multiple COPD exacerbations for which he has required admission to the hospital so far this year.  Found to be satting well on his home 2 L of oxygen via nasal cannula with increased work of breathing.  He will be placed in the hospital service and treated with IV steroids, scheduled DuoNebs, as needed albuterol, Mucinex, Levaquin and continuing home Breztri or formulary equivalent.    COPD exacerbation: Satting well on his home 2 L of oxygen via nasal cannula with increased work of breathing  - IV steroids  - Scheduled DuoNebs  - As needed albuterol  - Levaquin  - Mucinex  - Continue home Breztri or formulary equivalent  - Continuous pulse oximetry  - ABG if respiratory status worsens    2.  Hypertension:   - Continue home medications    3.  Hyperlipidemia:   - Continue home Crestor    4.  BPH:   - Continue home Flomax    5.  Hypokalemia: Potassium of 3.4 in the emergency department   - 40 mill equivalents p.o. potassium chloride   - Recheck potassium in the morning    Simeon An, was evaluated through a synchronous (real-time) audio-video encounter. The

## 2025-07-10 NOTE — PROGRESS NOTES
Patient told writer that he has his Breztri and would like to use it instead of substitute.  Writer will advise.

## 2025-07-10 NOTE — CARE COORDINATION
Care Management Initial Assessment       RUR: N/A OBS  Readmission? Yes - 06/21/2025  1st IM letter given? No  1st  letter given: No  Moon Given: 07/10/2025       07/10/25 0933   Service Assessment   Patient Orientation Alert and Oriented;Person;Place;Situation;Self   Cognition Alert   History Provided By Patient   Primary Caregiver Self   Support Systems Spouse/Significant Other   Patient's Healthcare Decision Maker is: Named in Scanned ACP Document   PCP Verified by CM Yes   Last Visit to PCP Within last 3 months   Prior Functional Level Assistance with the following:;Cooking;Housework;Shopping;Mobility   Current Functional Level Assistance with the following:;Cooking;Housework;Shopping;Mobility   Can patient return to prior living arrangement Yes   Ability to make needs known: Good   Family able to assist with home care needs: Yes   Would you like for me to discuss the discharge plan with any other family members/significant others, and if so, who? Yes  (Sharron De Paz, Girlfriend  [phone])   Financial Resources Medicare   Community Resources None   Social/Functional History   Lives With Significant other   Type of Home Mobile home   Home Layout One level   Home Access Stairs to enter with rails   Entrance Stairs - Number of Steps 5   Bathroom Accessibility Accessible   Home Equipment Oxygen  (Nebulizer via [employer])   Receives Help From Family;Personal care attendant   Prior Level of Assist for ADLs Independent   Prior Level of Assist for Homemaking Needs assistance   Meal Prep Moderate   Laundry Maximal   Vacuuming Total   Cleaning Total   Gardening Total   Yard Work Total   Shopping Moderate   Homemaking Responsibilities Yes   Meal Prep Responsibility Secondary   Bill Paying/Finance Responsibility Primary   Health Care Management Primary   Ambulation Assistance Independent   Prior Level of Assist for Transfers Independent   Active  Yes   Mode of Transportation Truck   Occupation Retired  follow-up and to discuss the pulmonary rehab program (Dr. Monroy as discussed this with him in the past). We also discussed transportation to pulmonary rehab appointments which are 2 x week and I am attempting to arrange transportation via [employer] program. Will continue to follow through with the arrangements mentioned above and follow for case management until time of discharge.     Medicare Outpatient Observation Notice provided to Simeon An. Oral explanation was provided and all questions answered. Signed document placed on the bedside chart to be scanned under the media tab. Copy provided to Simeon An.

## 2025-07-10 NOTE — ED NOTES
Attempted to give verbal report to the accepting nurse on the Med Surg unit, advised that the accepting nurse is unavailable at this time and that I will receive a call back when she is available to receive report on this patient. Charge nurse aware

## 2025-07-10 NOTE — ED NOTES
Verbal report given to Damion receiving nurse on Med Surg unit. All questions answered and advised that she is ready for the patient at this time

## 2025-07-10 NOTE — PROGRESS NOTES
round  foreign bodies overlie the bilateral chest, right greater than left.   IMPRESSION:  No acute cardiopulmonary process. Severe emphysema.    EKG 7/9:  bpm, PACs, Occ PVCs, NATHAN, ABN, Since 20JUN2025 more ectopy    Procedures: see electronic medical records for all procedures/Xrays and details which were not copied into this note but were reviewed prior to creation of Plan.        Assessment / Plan:    Principal Problem:    COPD exacerbation (HCC)  Active Problems:    Acute respiratory failure with hypoxia (HCC)  Severe disease  No clear infectious process  Solumedrol --> Prednisone   Scheduled for Levaquin oral x 5 days  Consider for a low dose Prednisone dose following taper  Will f/u with Dr. Monroy in Aug and possible begin Pulm Rehab then  Something for stress / anxiety could be beneficial - consider Zoloft  D/c Duoneb and may be too drying --> schedule some routine Albuterol Nebs      Hypertension  Maintain current tx HCTZ 25mg daily, Cozaar 100mg daily, Procardia XL 90mg daily      Hyperlipidemia  Maintain Crestor 20mg daily      GERD (gastroesophageal reflux disease)  Maintain Protonix 40mg daily      BPH   Maintain home tx Fliomax 0.4mg daily    ______________________________________________________________________  SAFETY:   Code Status:  full  DVT prophylaxis: Lovenox  Stress Ulcer prophylaxis: Protonix  Bladder catheter: no  Family Contact Info:  Primary Emergency Contact: Sharron De Paz, Home Phone: [phone]  Bedded: [facility] Room 122/01  Disposition: TBD, likely home when stable  (wants d/c but frequent returns)  Admission status:  OBS    Reviewed most current lab test results and cultures  YES  Reviewed most current radiology test results   YES  Review and summation of old records today    NO  Reviewed patient's current orders and MAR    YES  PMH/SH reviewed - no change compared to H&P    Care Plan discussed with:                                   Comments  Patient x     Family  x  Girl Friend  in room   RN x     Care Manager  x     Consultant                          x Multidiciplinary team rounds were held today with , nursing, pharmacist and clinical coordinator.  Patient's plan of care was discussed; medications were reviewed and discharge planning was addressed.        ____________________________________________    Total NON Critical Care TIME:  45   Minutes        Comments   >50% of visit spent in counseling and coordination of care   x      Signed: Frankie Lawrence MD  [facility] Hospitalist  [phone]

## 2025-07-10 NOTE — CARE COORDINATION
07/10/25 1004   Readmission Assessment   Number of Days since last admission? 8-30 days   Previous Disposition Home with Family   Who is being Interviewed Patient   What was the patient's/caregiver's perception as to why they think they needed to return back to the hospital? Other (Comment)  (Recurrent symptoms not managed by home regimen.)   Did you visit your Primary Care Physician after you left the hospital, before you returned this time? Yes   Did you see a specialist, such as Cardiac, Pulmonary, Orthopedic Physician, etc. after you left the hospital? No   Who advised the patient to return to the hospital? Self-referral   Does the patient report anything that got in the way of taking their medications? No   In our efforts to provide the best possible care to you and others like you, can you think of anything that we could have done to help you after you left the hospital the first time, so that you might not have needed to return so soon? Other (Comment)        details…

## 2025-07-10 NOTE — PLAN OF CARE
Problem: Safety - Adult  Goal: Free from fall injury  7/10/2025 0008 by Itz Broussard LPN  Outcome: Progressing     Problem: Respiratory - Adult  Goal: Achieves optimal ventilation and oxygenation  7/10/2025 0717 by Giovanny Lee, RT  Outcome: Progressing  7/10/2025 0008 by Itz Broussard LPN  Outcome: Progressing  7/9/2025 2337 by Alaina Osborne, RT  Outcome: Progressing

## 2025-07-10 NOTE — CARE COORDINATION
Rescheduled patient's appointment with Dr. Jarad Monroy [facility] for August 18,2025 1:30PM. Patient has agreed to discuss Pulmonary Rehab Program at that time and he states he is willing to attend the program. I was also able to identify ride assistance through [employer] [phone] which provides rides up to 2 x month for long distance appointments at a cost of $10 round trip with a 2 week notice. Patient was given written documentation of this information to schedule transport once he is enrolled in the program.

## 2025-07-10 NOTE — PLAN OF CARE
Problem: Safety - Adult  Goal: Free from fall injury  Outcome: Progressing     Problem: Respiratory - Adult  Goal: Achieves optimal ventilation and oxygenation  7/10/2025 1545 by Paxton Erickson RN  Outcome: Progressing  Flowsheets (Taken 7/10/2025 0755)  Achieves optimal ventilation and oxygenation: Assess for changes in respiratory status  7/10/2025 0717 by Giovanny Lee, RT  Outcome: Progressing

## 2025-07-10 NOTE — PROGRESS NOTES
Patient on 2lpm home O2. Patient groans when breathing. Patient stated that it was a habit.  Patient has home nebulizer and inhaler per patient.

## 2025-07-11 VITALS
BODY MASS INDEX: 21.42 KG/M2 | HEIGHT: 69 IN | SYSTOLIC BLOOD PRESSURE: 162 MMHG | RESPIRATION RATE: 20 BRPM | TEMPERATURE: 97.5 F | WEIGHT: 144.6 LBS | HEART RATE: 95 BPM | DIASTOLIC BLOOD PRESSURE: 95 MMHG | OXYGEN SATURATION: 100 %

## 2025-07-11 LAB
EKG ATRIAL RATE: 111 BPM
EKG DIAGNOSIS: NORMAL
EKG P AXIS: 86 DEGREES
EKG P-R INTERVAL: 144 MS
EKG Q-T INTERVAL: 328 MS
EKG QRS DURATION: 80 MS
EKG QTC CALCULATION (BAZETT): 446 MS
EKG R AXIS: 87 DEGREES
EKG T AXIS: 79 DEGREES
EKG VENTRICULAR RATE: 111 BPM

## 2025-07-11 PROCEDURE — 94640 AIRWAY INHALATION TREATMENT: CPT

## 2025-07-11 PROCEDURE — 94761 N-INVAS EAR/PLS OXIMETRY MLT: CPT

## 2025-07-11 PROCEDURE — 96376 TX/PRO/DX INJ SAME DRUG ADON: CPT

## 2025-07-11 PROCEDURE — 6370000000 HC RX 637 (ALT 250 FOR IP): Performed by: HOSPITALIST

## 2025-07-11 PROCEDURE — G0378 HOSPITAL OBSERVATION PER HR: HCPCS

## 2025-07-11 PROCEDURE — 96372 THER/PROPH/DIAG INJ SC/IM: CPT

## 2025-07-11 PROCEDURE — 6360000002 HC RX W HCPCS: Performed by: INTERNAL MEDICINE

## 2025-07-11 PROCEDURE — 2500000003 HC RX 250 WO HCPCS: Performed by: HOSPITALIST

## 2025-07-11 PROCEDURE — 6360000002 HC RX W HCPCS: Performed by: HOSPITALIST

## 2025-07-11 PROCEDURE — 2700000000 HC OXYGEN THERAPY PER DAY

## 2025-07-11 PROCEDURE — 2500000003 HC RX 250 WO HCPCS

## 2025-07-11 RX ORDER — BUDESONIDE, GLYCOPYRROLATE, AND FORMOTEROL FUMARATE 160; 9; 4.8 UG/1; UG/1; UG/1
2 AEROSOL, METERED RESPIRATORY (INHALATION) 2 TIMES DAILY
Qty: 1 EACH | Refills: 1 | Status: SHIPPED | OUTPATIENT
Start: 2025-07-11

## 2025-07-11 RX ORDER — WATER 10 ML/10ML
INJECTION INTRAMUSCULAR; INTRAVENOUS; SUBCUTANEOUS
Status: DISCONTINUED
Start: 2025-07-11 | End: 2025-07-11 | Stop reason: HOSPADM

## 2025-07-11 RX ORDER — AZITHROMYCIN 250 MG/1
250 TABLET, FILM COATED ORAL
Qty: 13 TABLET | Refills: 1 | Status: SHIPPED | OUTPATIENT
Start: 2025-07-11

## 2025-07-11 RX ORDER — WATER 10 ML/10ML
INJECTION INTRAMUSCULAR; INTRAVENOUS; SUBCUTANEOUS
Status: COMPLETED
Start: 2025-07-11 | End: 2025-07-11

## 2025-07-11 RX ORDER — ALBUTEROL SULFATE 0.83 MG/ML
2.5 SOLUTION RESPIRATORY (INHALATION) 4 TIMES DAILY
Qty: 120 EACH | Refills: 1 | Status: SHIPPED | OUTPATIENT
Start: 2025-07-11

## 2025-07-11 RX ORDER — PREDNISONE 10 MG/1
TABLET ORAL
Qty: 65 TABLET | Refills: 0 | Status: SHIPPED | OUTPATIENT
Start: 2025-07-11 | End: 2025-08-20

## 2025-07-11 RX ADMIN — GUAIFENESIN 1200 MG: 600 TABLET ORAL at 08:30

## 2025-07-11 RX ADMIN — NIFEDIPINE 90 MG: 30 TABLET, FILM COATED, EXTENDED RELEASE ORAL at 08:31

## 2025-07-11 RX ADMIN — LOSARTAN POTASSIUM 100 MG: 100 TABLET, FILM COATED ORAL at 08:32

## 2025-07-11 RX ADMIN — ENOXAPARIN SODIUM 40 MG: 100 INJECTION SUBCUTANEOUS at 08:32

## 2025-07-11 RX ADMIN — HYDROCHLOROTHIAZIDE 25 MG: 25 TABLET ORAL at 08:31

## 2025-07-11 RX ADMIN — WATER 10 ML: 1 INJECTION INTRAMUSCULAR; INTRAVENOUS; SUBCUTANEOUS at 04:15

## 2025-07-11 RX ADMIN — Medication 648 MG: at 08:30

## 2025-07-11 RX ADMIN — LEVOFLOXACIN 500 MG: 500 TABLET, FILM COATED ORAL at 08:30

## 2025-07-11 RX ADMIN — SODIUM CHLORIDE, PRESERVATIVE FREE 10 ML: 5 INJECTION INTRAVENOUS at 08:33

## 2025-07-11 RX ADMIN — ALBUTEROL SULFATE 2.5 MG: 2.5 SOLUTION RESPIRATORY (INHALATION) at 07:37

## 2025-07-11 RX ADMIN — TAMSULOSIN HYDROCHLORIDE 0.4 MG: 0.4 CAPSULE ORAL at 08:31

## 2025-07-11 RX ADMIN — METHYLPREDNISOLONE SODIUM SUCCINATE 40 MG: 40 INJECTION INTRAMUSCULAR; INTRAVENOUS at 08:32

## 2025-07-11 RX ADMIN — PANTOPRAZOLE SODIUM 40 MG: 40 TABLET, DELAYED RELEASE ORAL at 08:32

## 2025-07-11 RX ADMIN — METHYLPREDNISOLONE SODIUM SUCCINATE 40 MG: 40 INJECTION INTRAMUSCULAR; INTRAVENOUS at 04:15

## 2025-07-11 RX ADMIN — ROSUVASTATIN 20 MG: 20 TABLET, FILM COATED ORAL at 08:31

## 2025-07-11 NOTE — PLAN OF CARE
Problem: Respiratory - Adult  Goal: Achieves optimal ventilation and oxygenation  7/11/2025 0905 by Melissa Dwyer, RT  Outcome: Progressing  7/10/2025 2201 by Itz Broussard LPN  Outcome: Progressing  7/10/2025 2004 by Alaina Osborne,   Outcome: Progressing

## 2025-07-11 NOTE — DISCHARGE INSTRUCTIONS
HOSPITALIST RECOMMENDATIONS    Continued Rx Azithromycin three times weekly - Mon / Wed / Fri  Plan slow Prednisone taper, to continue low dose until follow up with Pulm Med 8/18  Continued Mucinex 600mg 1-2 tabs twice daily  Trial of Albuterol Nebulizer to see if this is less drying the DuoNeb (Ipatropium/Albuterol)  Encourage drinking fluids throughout the day  Planning for Pulmonary Rehab at follow up appointment  DARSHANA JIMENEZ MD   [phone]

## 2025-07-11 NOTE — PLAN OF CARE
Problem: Safety - Adult  Goal: Free from fall injury  7/11/2025 1148 by Skyla Weaver, RN  Outcome: Adequate for Discharge  7/10/2025 2201 by Itz Broussard LPN  Outcome: Progressing     Problem: Respiratory - Adult  Goal: Achieves optimal ventilation and oxygenation  7/11/2025 1148 by Skyla Weaver, RN  Outcome: Adequate for Discharge  7/11/2025 0905 by Melissa Dwyer, RT  Outcome: Progressing  Flowsheets (Taken 7/11/2025 0800 by Sklya Weaver, RN)  Achieves optimal ventilation and oxygenation:   Assess for changes in respiratory status   Respiratory therapy support as indicated  7/10/2025 2201 by Itz Broussard LPN  Outcome: Progressing

## 2025-07-11 NOTE — PLAN OF CARE
Problem: Respiratory - Adult  Goal: Achieves optimal ventilation and oxygenation  7/10/2025 2004 by Alaina Osborne, RT  Outcome: Progressing  7/10/2025 1545 by Paxton Erickson, RN  Outcome: Progressing  Flowsheets (Taken 7/10/2025 0755)  Achieves optimal ventilation and oxygenation: Assess for changes in respiratory status  7/10/2025 0717 by Giovanny Lee, RT  Outcome: Progressing

## 2025-08-19 ENCOUNTER — TRANSCRIBE ORDERS (OUTPATIENT)
Facility: HOSPITAL | Age: 65
End: 2025-08-19

## 2025-08-19 DIAGNOSIS — J84.9 ILD (INTERSTITIAL LUNG DISEASE) (HCC): Primary | ICD-10-CM
